# Patient Record
Sex: FEMALE | Race: OTHER | NOT HISPANIC OR LATINO | URBAN - METROPOLITAN AREA
[De-identification: names, ages, dates, MRNs, and addresses within clinical notes are randomized per-mention and may not be internally consistent; named-entity substitution may affect disease eponyms.]

---

## 2017-10-05 VITALS
OXYGEN SATURATION: 97 % | HEART RATE: 105 BPM | RESPIRATION RATE: 16 BRPM | TEMPERATURE: 99 F | SYSTOLIC BLOOD PRESSURE: 155 MMHG | DIASTOLIC BLOOD PRESSURE: 85 MMHG

## 2017-10-05 LAB
ALBUMIN SERPL ELPH-MCNC: 3 G/DL — LOW (ref 3.3–5)
ALP SERPL-CCNC: 76 U/L — SIGNIFICANT CHANGE UP (ref 40–120)
ALT FLD-CCNC: 12 U/L — SIGNIFICANT CHANGE UP (ref 10–45)
ANION GAP SERPL CALC-SCNC: 13 MMOL/L — SIGNIFICANT CHANGE UP (ref 5–17)
ANION GAP SERPL CALC-SCNC: 14 MMOL/L — SIGNIFICANT CHANGE UP (ref 5–17)
APPEARANCE UR: CLEAR — SIGNIFICANT CHANGE UP
APTT BLD: 26.8 SEC — LOW (ref 27.5–37.4)
AST SERPL-CCNC: 22 U/L — SIGNIFICANT CHANGE UP (ref 10–40)
B-OH-BUTYR SERPL-SCNC: 0.5 MMOL/L — HIGH
BACTERIA # UR AUTO: (no result) /HPF
BASE EXCESS BLDV CALC-SCNC: 5.2 MMOL/L — SIGNIFICANT CHANGE UP
BASE EXCESS BLDV CALC-SCNC: 5.5 MMOL/L — SIGNIFICANT CHANGE UP
BASOPHILS NFR BLD AUTO: 0.2 % — SIGNIFICANT CHANGE UP (ref 0–2)
BILIRUB SERPL-MCNC: 0.3 MG/DL — SIGNIFICANT CHANGE UP (ref 0.2–1.2)
BILIRUB UR-MCNC: NEGATIVE — SIGNIFICANT CHANGE UP
BUN SERPL-MCNC: 17 MG/DL — SIGNIFICANT CHANGE UP (ref 7–23)
BUN SERPL-MCNC: 19 MG/DL — SIGNIFICANT CHANGE UP (ref 7–23)
CALCIUM SERPL-MCNC: 8.5 MG/DL — SIGNIFICANT CHANGE UP (ref 8.4–10.5)
CALCIUM SERPL-MCNC: 9 MG/DL — SIGNIFICANT CHANGE UP (ref 8.4–10.5)
CHLORIDE SERPL-SCNC: 87 MMOL/L — LOW (ref 96–108)
CHLORIDE SERPL-SCNC: 90 MMOL/L — LOW (ref 96–108)
CO2 SERPL-SCNC: 26 MMOL/L — SIGNIFICANT CHANGE UP (ref 22–31)
CO2 SERPL-SCNC: 26 MMOL/L — SIGNIFICANT CHANGE UP (ref 22–31)
COLOR SPEC: YELLOW — SIGNIFICANT CHANGE UP
CREAT SERPL-MCNC: 0.94 MG/DL — SIGNIFICANT CHANGE UP (ref 0.5–1.3)
CREAT SERPL-MCNC: 1.02 MG/DL — SIGNIFICANT CHANGE UP (ref 0.5–1.3)
DIFF PNL FLD: (no result)
EOSINOPHIL NFR BLD AUTO: 0.2 % — SIGNIFICANT CHANGE UP (ref 0–6)
EPI CELLS # UR: SIGNIFICANT CHANGE UP /HPF
GAS PNL BLDV: SIGNIFICANT CHANGE UP
GAS PNL BLDV: SIGNIFICANT CHANGE UP
GLUCOSE SERPL-MCNC: 281 MG/DL — HIGH (ref 70–99)
GLUCOSE SERPL-MCNC: 472 MG/DL — CRITICAL HIGH (ref 70–99)
GLUCOSE UR QL: >=1000
HCO3 BLDV-SCNC: 30 MMOL/L — HIGH (ref 20–27)
HCO3 BLDV-SCNC: 30 MMOL/L — HIGH (ref 20–27)
HCT VFR BLD CALC: 32.6 % — LOW (ref 34.5–45)
HGB BLD-MCNC: 10.6 G/DL — LOW (ref 11.5–15.5)
INR BLD: 1.04 — SIGNIFICANT CHANGE UP (ref 0.88–1.16)
KETONES UR-MCNC: (no result) MG/DL
LACTATE SERPL-SCNC: 1 MMOL/L — SIGNIFICANT CHANGE UP (ref 0.5–2)
LEUKOCYTE ESTERASE UR-ACNC: (no result)
LYMPHOCYTES # BLD AUTO: 11.6 % — LOW (ref 13–44)
MCHC RBC-ENTMCNC: 26.4 PG — LOW (ref 27–34)
MCHC RBC-ENTMCNC: 32.5 G/DL — SIGNIFICANT CHANGE UP (ref 32–36)
MCV RBC AUTO: 81.1 FL — SIGNIFICANT CHANGE UP (ref 80–100)
MONOCYTES NFR BLD AUTO: 8.5 % — SIGNIFICANT CHANGE UP (ref 2–14)
NEUTROPHILS NFR BLD AUTO: 79.5 % — HIGH (ref 43–77)
NITRITE UR-MCNC: NEGATIVE — SIGNIFICANT CHANGE UP
PCO2 BLDV: 43 MMHG — SIGNIFICANT CHANGE UP (ref 41–51)
PCO2 BLDV: 44 MMHG — SIGNIFICANT CHANGE UP (ref 41–51)
PH BLDV: 7.45 — HIGH (ref 7.32–7.43)
PH BLDV: 7.46 — HIGH (ref 7.32–7.43)
PH UR: 6 — SIGNIFICANT CHANGE UP (ref 5–8)
PLATELET # BLD AUTO: 271 K/UL — SIGNIFICANT CHANGE UP (ref 150–400)
PO2 BLDV: 36 MMHG — SIGNIFICANT CHANGE UP
PO2 BLDV: 51 MMHG — SIGNIFICANT CHANGE UP
POTASSIUM SERPL-MCNC: 4 MMOL/L — SIGNIFICANT CHANGE UP (ref 3.5–5.3)
POTASSIUM SERPL-MCNC: 6 MMOL/L — HIGH (ref 3.5–5.3)
POTASSIUM SERPL-SCNC: 4 MMOL/L — SIGNIFICANT CHANGE UP (ref 3.5–5.3)
POTASSIUM SERPL-SCNC: 6 MMOL/L — HIGH (ref 3.5–5.3)
PROT SERPL-MCNC: 8.1 G/DL — SIGNIFICANT CHANGE UP (ref 6–8.3)
PROT UR-MCNC: NEGATIVE MG/DL — SIGNIFICANT CHANGE UP
PROTHROM AB SERPL-ACNC: 11.5 SEC — SIGNIFICANT CHANGE UP (ref 9.8–12.7)
RBC # BLD: 4.02 M/UL — SIGNIFICANT CHANGE UP (ref 3.8–5.2)
RBC # FLD: 12.6 % — SIGNIFICANT CHANGE UP (ref 10.3–16.9)
RBC CASTS # UR COMP ASSIST: < 5 /HPF — SIGNIFICANT CHANGE UP
SAO2 % BLDV: 66 % — SIGNIFICANT CHANGE UP
SAO2 % BLDV: 88 % — SIGNIFICANT CHANGE UP
SODIUM SERPL-SCNC: 127 MMOL/L — LOW (ref 135–145)
SODIUM SERPL-SCNC: 129 MMOL/L — LOW (ref 135–145)
SP GR SPEC: <=1.005 — SIGNIFICANT CHANGE UP (ref 1–1.03)
UROBILINOGEN FLD QL: 0.2 E.U./DL — SIGNIFICANT CHANGE UP
WBC # BLD: 8.9 K/UL — SIGNIFICANT CHANGE UP (ref 3.8–10.5)
WBC # FLD AUTO: 8.9 K/UL — SIGNIFICANT CHANGE UP (ref 3.8–10.5)
WBC UR QL: (no result) /HPF

## 2017-10-05 PROCEDURE — 71010: CPT | Mod: 26

## 2017-10-05 PROCEDURE — 93010 ELECTROCARDIOGRAM REPORT: CPT

## 2017-10-05 PROCEDURE — 74177 CT ABD & PELVIS W/CONTRAST: CPT | Mod: 26

## 2017-10-05 PROCEDURE — 99285 EMERGENCY DEPT VISIT HI MDM: CPT | Mod: 25

## 2017-10-05 RX ORDER — INSULIN HUMAN 100 [IU]/ML
10 INJECTION, SOLUTION SUBCUTANEOUS ONCE
Qty: 0 | Refills: 0 | Status: COMPLETED | OUTPATIENT
Start: 2017-10-05 | End: 2017-10-05

## 2017-10-05 RX ORDER — ACETAMINOPHEN 500 MG
975 TABLET ORAL ONCE
Qty: 0 | Refills: 0 | Status: COMPLETED | OUTPATIENT
Start: 2017-10-05 | End: 2017-10-05

## 2017-10-05 RX ORDER — CEFTRIAXONE 500 MG/1
1 INJECTION, POWDER, FOR SOLUTION INTRAMUSCULAR; INTRAVENOUS ONCE
Qty: 0 | Refills: 0 | Status: COMPLETED | OUTPATIENT
Start: 2017-10-05 | End: 2017-10-05

## 2017-10-05 RX ORDER — SODIUM CHLORIDE 9 MG/ML
2000 INJECTION INTRAMUSCULAR; INTRAVENOUS; SUBCUTANEOUS ONCE
Qty: 0 | Refills: 0 | Status: COMPLETED | OUTPATIENT
Start: 2017-10-05 | End: 2017-10-05

## 2017-10-05 RX ORDER — CALCIUM GLUCONATE 100 MG/ML
1 VIAL (ML) INTRAVENOUS ONCE
Qty: 0 | Refills: 0 | Status: COMPLETED | OUTPATIENT
Start: 2017-10-05 | End: 2017-10-05

## 2017-10-05 RX ADMIN — INSULIN HUMAN 10 UNIT(S): 100 INJECTION, SOLUTION SUBCUTANEOUS at 21:41

## 2017-10-05 RX ADMIN — SODIUM CHLORIDE 1000 MILLILITER(S): 9 INJECTION INTRAMUSCULAR; INTRAVENOUS; SUBCUTANEOUS at 19:40

## 2017-10-05 RX ADMIN — Medication 975 MILLIGRAM(S): at 21:41

## 2017-10-05 RX ADMIN — Medication 975 MILLIGRAM(S): at 20:00

## 2017-10-05 RX ADMIN — Medication 200 GRAM(S): at 21:41

## 2017-10-05 RX ADMIN — CEFTRIAXONE 100 GRAM(S): 500 INJECTION, POWDER, FOR SOLUTION INTRAMUSCULAR; INTRAVENOUS at 19:50

## 2017-10-05 NOTE — ED PROVIDER NOTE - CARE PLAN
Principal Discharge DX:	Pyelonephritis  Secondary Diagnosis:	Hyperglycemia  Secondary Diagnosis:	Weakness

## 2017-10-05 NOTE — ED ADULT TRIAGE NOTE - CHIEF COMPLAINT QUOTE
Pt c/o weakness x one week and chills, denies fever also reports her sugar as 409 mg/dl today. In triage BG is 468 mg/dL.

## 2017-10-05 NOTE — ED PROVIDER NOTE - PHYSICAL EXAMINATION
CON: ao x 3, HENMT: clear oropharynx, soft neck, HEAD: atraumatic, CV: rrr, equal pulses b/l, RESP: cta b/l, GI: +BS, soft, lower abd discomfort, : no CVAT, SKIN: no rash, MSK: no edema, no deformity, NEURO: follows commands appropriately, conversing appropriately, moving all extremities spontaneously

## 2017-10-05 NOTE — ED ADULT NURSE NOTE - OBJECTIVE STATEMENT
Pt presented to ED with hyperglycemia, generalized weakness, lower abdominal pain, nausea and burning urination x 1 week. Pt denies vomiting, diarrhea, chest pain, dizziness, SOB. Upon initial assessment, 102.1 rectal temp noted. Pt has been upgraded to MD Rivera, labs including lactate and blood culture has been sent.

## 2017-10-05 NOTE — ED PROVIDER NOTE - OBJECTIVE STATEMENT
72 yof bib family for progressively weakness, generalized, lower abd pain, dysuria, fever.  no vomiting.  no cp/sob/cough.  hx of DM.  sx: weakness  a/w: no fc  duration: days  quality: dysuria  location: lower abd  radiation: none  modifying/eliciting factors: none

## 2017-10-05 NOTE — ED PROVIDER NOTE - MEDICAL DECISION MAKING DETAILS
febrile, hyperglycemia, generalized weakness, lower abd discomfort though no peritoneal sign, will check UA, dka labs, fluids, abx, antipyretics, CT eval for possible colonic pathology

## 2017-10-06 ENCOUNTER — INPATIENT (INPATIENT)
Facility: HOSPITAL | Age: 72
LOS: 2 days | Discharge: HOME | DRG: 872 | End: 2017-10-09
Attending: SPECIALIST | Admitting: SPECIALIST
Payer: MEDICARE

## 2017-10-06 DIAGNOSIS — E87.1 HYPO-OSMOLALITY AND HYPONATREMIA: ICD-10-CM

## 2017-10-06 DIAGNOSIS — R78.81 BACTEREMIA: ICD-10-CM

## 2017-10-06 DIAGNOSIS — Z98.890 OTHER SPECIFIED POSTPROCEDURAL STATES: Chronic | ICD-10-CM

## 2017-10-06 DIAGNOSIS — I10 ESSENTIAL (PRIMARY) HYPERTENSION: ICD-10-CM

## 2017-10-06 DIAGNOSIS — N12 TUBULO-INTERSTITIAL NEPHRITIS, NOT SPECIFIED AS ACUTE OR CHRONIC: ICD-10-CM

## 2017-10-06 DIAGNOSIS — R63.8 OTHER SYMPTOMS AND SIGNS CONCERNING FOOD AND FLUID INTAKE: ICD-10-CM

## 2017-10-06 DIAGNOSIS — D64.9 ANEMIA, UNSPECIFIED: ICD-10-CM

## 2017-10-06 DIAGNOSIS — R53.1 WEAKNESS: ICD-10-CM

## 2017-10-06 DIAGNOSIS — E11.65 TYPE 2 DIABETES MELLITUS WITH HYPERGLYCEMIA: ICD-10-CM

## 2017-10-06 DIAGNOSIS — A41.9 SEPSIS, UNSPECIFIED ORGANISM: ICD-10-CM

## 2017-10-06 DIAGNOSIS — H40.9 UNSPECIFIED GLAUCOMA: ICD-10-CM

## 2017-10-06 DIAGNOSIS — Z29.9 ENCOUNTER FOR PROPHYLACTIC MEASURES, UNSPECIFIED: ICD-10-CM

## 2017-10-06 DIAGNOSIS — E87.5 HYPERKALEMIA: ICD-10-CM

## 2017-10-06 DIAGNOSIS — R73.9 HYPERGLYCEMIA, UNSPECIFIED: ICD-10-CM

## 2017-10-06 LAB
-  K. PNEUMONIAE GROUP: SIGNIFICANT CHANGE UP
ALBUMIN SERPL ELPH-MCNC: 2.5 G/DL — LOW (ref 3.3–5)
ALP SERPL-CCNC: 62 U/L — SIGNIFICANT CHANGE UP (ref 40–120)
ALT FLD-CCNC: 8 U/L — LOW (ref 10–45)
ANION GAP SERPL CALC-SCNC: 14 MMOL/L — SIGNIFICANT CHANGE UP (ref 5–17)
ANISOCYTOSIS BLD QL: SLIGHT — SIGNIFICANT CHANGE UP
AST SERPL-CCNC: 16 U/L — SIGNIFICANT CHANGE UP (ref 10–40)
BASOPHILS NFR BLD AUTO: 0.1 % — SIGNIFICANT CHANGE UP (ref 0–2)
BILIRUB DIRECT SERPL-MCNC: <0.2 MG/DL — SIGNIFICANT CHANGE UP (ref 0–0.2)
BILIRUB INDIRECT FLD-MCNC: >0 MG/DL — LOW (ref 0.2–1)
BILIRUB SERPL-MCNC: 0.2 MG/DL — SIGNIFICANT CHANGE UP (ref 0.2–1.2)
BUN SERPL-MCNC: 14 MG/DL — SIGNIFICANT CHANGE UP (ref 7–23)
CALCIUM SERPL-MCNC: 8.4 MG/DL — SIGNIFICANT CHANGE UP (ref 8.4–10.5)
CHLORIDE SERPL-SCNC: 94 MMOL/L — LOW (ref 96–108)
CO2 SERPL-SCNC: 26 MMOL/L — SIGNIFICANT CHANGE UP (ref 22–31)
CREAT SERPL-MCNC: 0.82 MG/DL — SIGNIFICANT CHANGE UP (ref 0.5–1.3)
EOSINOPHIL NFR BLD AUTO: 0.1 % — SIGNIFICANT CHANGE UP (ref 0–6)
FERRITIN SERPL-MCNC: 211.2 NG/ML — HIGH (ref 15–150)
FOLATE SERPL-MCNC: 10.6 NG/ML — SIGNIFICANT CHANGE UP (ref 4.8–24.2)
GLUCOSE SERPL-MCNC: 290 MG/DL — HIGH (ref 70–99)
GRAM STN FLD: SIGNIFICANT CHANGE UP
HBA1C BLD-MCNC: 10.9 % — HIGH (ref 4–5.6)
HCT VFR BLD CALC: 28.5 % — LOW (ref 34.5–45)
HGB BLD-MCNC: 9.2 G/DL — LOW (ref 11.5–15.5)
IRON SATN MFR SERPL: 10 % — LOW (ref 14–50)
IRON SATN MFR SERPL: 15 UG/DL — LOW (ref 30–160)
LYMPHOCYTES # BLD AUTO: 10 % — LOW (ref 13–44)
LYMPHOCYTES # BLD AUTO: 7.5 % — LOW (ref 13–44)
MAGNESIUM SERPL-MCNC: 1.3 MG/DL — LOW (ref 1.6–2.6)
MAGNESIUM SERPL-MCNC: 2.4 MG/DL — SIGNIFICANT CHANGE UP (ref 1.6–2.6)
MANUAL DIF COMMENT BLD-IMP: SIGNIFICANT CHANGE UP
MANUAL SMEAR VERIFICATION: SIGNIFICANT CHANGE UP
MCHC RBC-ENTMCNC: 26.4 PG — LOW (ref 27–34)
MCHC RBC-ENTMCNC: 32.3 G/DL — SIGNIFICANT CHANGE UP (ref 32–36)
MCV RBC AUTO: 81.7 FL — SIGNIFICANT CHANGE UP (ref 80–100)
METHOD TYPE: SIGNIFICANT CHANGE UP
MICROCYTES BLD QL: SLIGHT — SIGNIFICANT CHANGE UP
MONOCYTES NFR BLD AUTO: 11 % — SIGNIFICANT CHANGE UP (ref 2–14)
MONOCYTES NFR BLD AUTO: 8.3 % — SIGNIFICANT CHANGE UP (ref 2–14)
NEUTROPHILS NFR BLD AUTO: 75 % — SIGNIFICANT CHANGE UP (ref 43–77)
NEUTROPHILS NFR BLD AUTO: 84 % — HIGH (ref 43–77)
NEUTS BAND # BLD: 4 % — SIGNIFICANT CHANGE UP
OVALOCYTES BLD QL SMEAR: SLIGHT — SIGNIFICANT CHANGE UP
PLAT MORPH BLD: (no result)
PLATELET # BLD AUTO: 228 K/UL — SIGNIFICANT CHANGE UP (ref 150–400)
POLYCHROMASIA BLD QL SMEAR: SLIGHT — SIGNIFICANT CHANGE UP
POTASSIUM SERPL-MCNC: 3.8 MMOL/L — SIGNIFICANT CHANGE UP (ref 3.5–5.3)
POTASSIUM SERPL-SCNC: 3.8 MMOL/L — SIGNIFICANT CHANGE UP (ref 3.5–5.3)
PROT SERPL-MCNC: 6.1 G/DL — SIGNIFICANT CHANGE UP (ref 6–8.3)
RBC # BLD: 3.49 M/UL — LOW (ref 3.8–5.2)
RBC # BLD: 3.49 M/UL — LOW (ref 3.8–5.2)
RBC # FLD: 12.5 % — SIGNIFICANT CHANGE UP (ref 10.3–16.9)
RBC BLD AUTO: (no result)
RETICS/RBC NFR: 1.5 % — SIGNIFICANT CHANGE UP (ref 0.5–2.5)
SODIUM SERPL-SCNC: 134 MMOL/L — LOW (ref 135–145)
TIBC SERPL-MCNC: 149 UG/DL — LOW (ref 220–430)
TRANSFERRIN SERPL-MCNC: 128 MG/DL — LOW (ref 200–360)
UIBC SERPL-MCNC: 134 UG/DL — SIGNIFICANT CHANGE UP (ref 110–370)
VIT B12 SERPL-MCNC: 1032 PG/ML — HIGH (ref 243–894)
WBC # BLD: 9.2 K/UL — SIGNIFICANT CHANGE UP (ref 3.8–10.5)
WBC # FLD AUTO: 9.2 K/UL — SIGNIFICANT CHANGE UP (ref 3.8–10.5)

## 2017-10-06 RX ORDER — INSULIN GLARGINE 100 [IU]/ML
10 INJECTION, SOLUTION SUBCUTANEOUS EVERY MORNING
Qty: 0 | Refills: 0 | Status: DISCONTINUED | OUTPATIENT
Start: 2017-10-06 | End: 2017-10-07

## 2017-10-06 RX ORDER — PANTOPRAZOLE SODIUM 20 MG/1
40 TABLET, DELAYED RELEASE ORAL
Qty: 0 | Refills: 0 | Status: DISCONTINUED | OUTPATIENT
Start: 2017-10-06 | End: 2017-10-09

## 2017-10-06 RX ORDER — MAGNESIUM SULFATE 500 MG/ML
4 VIAL (ML) INJECTION ONCE
Qty: 0 | Refills: 0 | Status: COMPLETED | OUTPATIENT
Start: 2017-10-06 | End: 2017-10-06

## 2017-10-06 RX ORDER — GLUCAGON INJECTION, SOLUTION 0.5 MG/.1ML
1 INJECTION, SOLUTION SUBCUTANEOUS ONCE
Qty: 0 | Refills: 0 | Status: DISCONTINUED | OUTPATIENT
Start: 2017-10-06 | End: 2017-10-09

## 2017-10-06 RX ORDER — DEXTROSE 50 % IN WATER 50 %
1 SYRINGE (ML) INTRAVENOUS ONCE
Qty: 0 | Refills: 0 | Status: DISCONTINUED | OUTPATIENT
Start: 2017-10-06 | End: 2017-10-09

## 2017-10-06 RX ORDER — BETAXOLOL HCL 0.25 %
1 SUSPENSION, DROPS(FINAL DOSAGE FORM)(ML) OPHTHALMIC (EYE)
Qty: 0 | Refills: 0 | COMMUNITY

## 2017-10-06 RX ORDER — ACETAMINOPHEN 500 MG
650 TABLET ORAL EVERY 6 HOURS
Qty: 0 | Refills: 0 | Status: DISCONTINUED | OUTPATIENT
Start: 2017-10-06 | End: 2017-10-09

## 2017-10-06 RX ORDER — INSULIN ASPART 100 [IU]/ML
8 INJECTION, SOLUTION SUBCUTANEOUS
Qty: 0 | Refills: 0 | COMMUNITY

## 2017-10-06 RX ORDER — INSULIN GLARGINE 100 [IU]/ML
4 INJECTION, SOLUTION SUBCUTANEOUS
Qty: 0 | Refills: 0 | COMMUNITY

## 2017-10-06 RX ORDER — PIPERACILLIN AND TAZOBACTAM 4; .5 G/20ML; G/20ML
3.38 INJECTION, POWDER, LYOPHILIZED, FOR SOLUTION INTRAVENOUS EVERY 6 HOURS
Qty: 0 | Refills: 0 | Status: DISCONTINUED | OUTPATIENT
Start: 2017-10-06 | End: 2017-10-09

## 2017-10-06 RX ORDER — DEXTROSE 50 % IN WATER 50 %
25 SYRINGE (ML) INTRAVENOUS ONCE
Qty: 0 | Refills: 0 | Status: DISCONTINUED | OUTPATIENT
Start: 2017-10-06 | End: 2017-10-09

## 2017-10-06 RX ORDER — SODIUM CHLORIDE 9 MG/ML
1000 INJECTION INTRAMUSCULAR; INTRAVENOUS; SUBCUTANEOUS ONCE
Qty: 0 | Refills: 0 | Status: COMPLETED | OUTPATIENT
Start: 2017-10-06 | End: 2017-10-06

## 2017-10-06 RX ORDER — OMEPRAZOLE 10 MG/1
1 CAPSULE, DELAYED RELEASE ORAL
Qty: 0 | Refills: 0 | COMMUNITY

## 2017-10-06 RX ORDER — LOVASTATIN 20 MG
1 TABLET ORAL
Qty: 0 | Refills: 0 | COMMUNITY

## 2017-10-06 RX ORDER — ATORVASTATIN CALCIUM 80 MG/1
10 TABLET, FILM COATED ORAL AT BEDTIME
Qty: 0 | Refills: 0 | Status: DISCONTINUED | OUTPATIENT
Start: 2017-10-06 | End: 2017-10-09

## 2017-10-06 RX ORDER — DEXTROSE 50 % IN WATER 50 %
12.5 SYRINGE (ML) INTRAVENOUS ONCE
Qty: 0 | Refills: 0 | Status: DISCONTINUED | OUTPATIENT
Start: 2017-10-06 | End: 2017-10-09

## 2017-10-06 RX ORDER — METFORMIN HYDROCHLORIDE 850 MG/1
1 TABLET ORAL
Qty: 0 | Refills: 0 | COMMUNITY

## 2017-10-06 RX ORDER — INSULIN LISPRO 100/ML
3 VIAL (ML) SUBCUTANEOUS
Qty: 0 | Refills: 0 | Status: DISCONTINUED | OUTPATIENT
Start: 2017-10-06 | End: 2017-10-09

## 2017-10-06 RX ORDER — INSULIN LISPRO 100/ML
VIAL (ML) SUBCUTANEOUS
Qty: 0 | Refills: 0 | Status: DISCONTINUED | OUTPATIENT
Start: 2017-10-06 | End: 2017-10-09

## 2017-10-06 RX ORDER — SODIUM CHLORIDE 9 MG/ML
1000 INJECTION INTRAMUSCULAR; INTRAVENOUS; SUBCUTANEOUS
Qty: 0 | Refills: 0 | Status: DISCONTINUED | OUTPATIENT
Start: 2017-10-06 | End: 2017-10-09

## 2017-10-06 RX ORDER — SODIUM CHLORIDE 9 MG/ML
500 INJECTION INTRAMUSCULAR; INTRAVENOUS; SUBCUTANEOUS ONCE
Qty: 0 | Refills: 0 | Status: COMPLETED | OUTPATIENT
Start: 2017-10-06 | End: 2017-10-06

## 2017-10-06 RX ORDER — SODIUM CHLORIDE 9 MG/ML
1000 INJECTION, SOLUTION INTRAVENOUS
Qty: 0 | Refills: 0 | Status: DISCONTINUED | OUTPATIENT
Start: 2017-10-06 | End: 2017-10-09

## 2017-10-06 RX ADMIN — Medication 650 MILLIGRAM(S): at 16:28

## 2017-10-06 RX ADMIN — Medication 650 MILLIGRAM(S): at 04:34

## 2017-10-06 RX ADMIN — SODIUM CHLORIDE 80 MILLILITER(S): 9 INJECTION INTRAMUSCULAR; INTRAVENOUS; SUBCUTANEOUS at 16:39

## 2017-10-06 RX ADMIN — PIPERACILLIN AND TAZOBACTAM 3.33 GRAM(S): 4; .5 INJECTION, POWDER, LYOPHILIZED, FOR SOLUTION INTRAVENOUS at 09:37

## 2017-10-06 RX ADMIN — Medication 2: at 18:12

## 2017-10-06 RX ADMIN — SODIUM CHLORIDE 80 MILLILITER(S): 9 INJECTION INTRAMUSCULAR; INTRAVENOUS; SUBCUTANEOUS at 16:27

## 2017-10-06 RX ADMIN — Medication 6: at 13:31

## 2017-10-06 RX ADMIN — Medication 650 MILLIGRAM(S): at 03:34

## 2017-10-06 RX ADMIN — PIPERACILLIN AND TAZOBACTAM 200 GRAM(S): 4; .5 INJECTION, POWDER, LYOPHILIZED, FOR SOLUTION INTRAVENOUS at 15:29

## 2017-10-06 RX ADMIN — Medication 3 UNIT(S): at 13:31

## 2017-10-06 RX ADMIN — PANTOPRAZOLE SODIUM 40 MILLIGRAM(S): 20 TABLET, DELAYED RELEASE ORAL at 06:08

## 2017-10-06 RX ADMIN — Medication 6: at 06:59

## 2017-10-06 RX ADMIN — SODIUM CHLORIDE 1801.8 MILLILITER(S): 9 INJECTION INTRAMUSCULAR; INTRAVENOUS; SUBCUTANEOUS at 16:40

## 2017-10-06 RX ADMIN — PIPERACILLIN AND TAZOBACTAM 3.33 GRAM(S): 4; .5 INJECTION, POWDER, LYOPHILIZED, FOR SOLUTION INTRAVENOUS at 03:34

## 2017-10-06 RX ADMIN — PIPERACILLIN AND TAZOBACTAM 200 GRAM(S): 4; .5 INJECTION, POWDER, LYOPHILIZED, FOR SOLUTION INTRAVENOUS at 22:10

## 2017-10-06 RX ADMIN — INSULIN GLARGINE 10 UNIT(S): 100 INJECTION, SOLUTION SUBCUTANEOUS at 09:37

## 2017-10-06 RX ADMIN — Medication 100 GRAM(S): at 04:59

## 2017-10-06 RX ADMIN — ATORVASTATIN CALCIUM 10 MILLIGRAM(S): 80 TABLET, FILM COATED ORAL at 22:10

## 2017-10-06 RX ADMIN — SODIUM CHLORIDE 4000 MILLILITER(S): 9 INJECTION INTRAMUSCULAR; INTRAVENOUS; SUBCUTANEOUS at 03:34

## 2017-10-06 RX ADMIN — Medication 3 UNIT(S): at 18:12

## 2017-10-06 NOTE — CONSULT NOTE ADULT - SUBJECTIVE AND OBJECTIVE BOX
71 yo F PMH DM2 on insulin w/ complication, HTN presenting with weakness for over one week. The patient came in yesterday with complains of back pain, frequent urination with dysuria, chills and fevers for 2 weeks. She came in the ED and found to be febrile and started treatment for   pyelonephritis. The renal service is activated for possible abscesses. When I saw the patient she is lying in her bed. feels better today compared to yesterday. Still has back pain and suprpubic pain, noticed changing in the color of urine. Does not have much appetite, feel nauseous.        Patient is a 72y Female admitted for     PAST MEDICAL & SURGICAL HISTORY:  HTN (hypertension)  Diabetes  History of thyroid surgery      MEDICATIONS  (STANDING):  atorvastatin 10 milliGRAM(s) Oral at bedtime  dextrose 5%. 1000 milliLiter(s) (50 mL/Hr) IV Continuous <Continuous>  dextrose 50% Injectable 12.5 Gram(s) IV Push once  dextrose 50% Injectable 25 Gram(s) IV Push once  dextrose 50% Injectable 25 Gram(s) IV Push once  insulin glargine Injectable (LANTUS) 10 Unit(s) SubCutaneous every morning  insulin lispro (HumaLOG) corrective regimen sliding scale   SubCutaneous Before meals and at bedtime  insulin lispro Injectable (HumaLOG) 3 Unit(s) SubCutaneous three times a day before meals  pantoprazole    Tablet 40 milliGRAM(s) Oral before breakfast  piperacillin/tazobactam IVPB. 3.375 Gram(s) IV Intermittent every 6 hours    MEDICATIONS  (PRN):  acetaminophen   Tablet. 650 milliGRAM(s) Oral every 6 hours PRN Moderate Pain (4 - 6)  dextrose Gel 1 Dose(s) Oral once PRN Blood Glucose LESS THAN 70 milliGRAM(s)/deciliter  glucagon  Injectable 1 milliGRAM(s) IntraMuscular once PRN Glucose LESS THAN 70 milligrams/deciliter      Allergies    No Known Allergies    Intolerances        SOCIAL HISTORY: no alcohol, no drugs, no smoking     FAMILY HISTORY: none       T(C): , Max: 38.9 (10-05-17 @ 19:28)  T(F): , Max: 102.1 (10-05-17 @ 19:28)  HR: 71 (10-06-17 @ 08:28)  BP: 116/71 (10-06-17 @ 08:28)  BP(mean): --  RR: 17 (10-06-17 @ 08:28)  SpO2: 99% (10-06-17 @ 08:28)  Wt(kg): --    10-05 @ 07:01  -  10-06 @ 07:00  --------------------------------------------------------  IN: 1200 mL / OUT: 0 mL / NET: 1200 mL      Height (cm): 162.56 (10-06 @ 03:03)  Weight (kg): 63 (10-06 @ 05:02)  BMI (kg/m2): 23.8 (10-06 @ 05:02)  BSA (m2): 1.68 (10-06 @ 05:02)    Physical exam:   Alert and oriented   Lying in the bed, not in distress   Normal air entry into the lungs, no wheezing, no crackles   RRR, normal s1/s2, no murmurs, rubs or gallops   Abdomen - soft, non-tender, non-distended, suprapubic tenderness   CVA tenderness on the right side     LABS:                        9.2    9.2   )-----------( 228      ( 06 Oct 2017 06:14 )             28.5     10-06    134<L>  |  94<L>  |  14  ----------------------------<  290<H>  3.8   |  26  |  0.82    Ca    8.4      06 Oct 2017 06:13  Mg     2.4     10-06    TPro  6.1  /  Alb  2.5<L>  /  TBili  0.2  /  DBili  <0.2  /  AST  16  /  ALT  8<L>  /  AlkPhos  62  10-06    Hemoglobin A1C, Whole Blood: 10.9 % <H> [4.0 - 5.6] (10-06 @ 06:14)    PT/INR - ( 05 Oct 2017 19:52 )   PT: 11.5 sec;   INR: 1.04          PTT - ( 05 Oct 2017 19:52 )  PTT:26.8 sec  Urinalysis Basic - ( 05 Oct 2017 19:52 )    Color: Yellow / Appearance: Clear / SG: <=1.005 / pH: x  Gluc: x / Ketone: Trace mg/dL  / Bili: Negative / Urobili: 0.2 E.U./dL   Blood: x / Protein: NEGATIVE mg/dL / Nitrite: NEGATIVE   Leuk Esterase: Small / RBC: < 5 /HPF / WBC Many /HPF   Sq Epi: x / Non Sq Epi: Rare /HPF / Bacteria: Many /HPF            RADIOLOGY & ADDITIONAL STUDIES:    < from: CT Abdomen and Pelvis w/ IV Cont (10.05.17 @ 22:53) >           INTERPRETATION:  The preliminary virtual radiologic report was reviewed   by the attending radiologist on 7/6/2017 at 1111 hours, with the   following modifications:    Enteric contrast was not administered, limiting complete evaluation of   the gastrointestinal tract.    I agree with the findings of a probable developing lobar nephronia in the   upper pole of the left kidney, given findings in the contralateral   kidney.   However, a short-term follow-up contrast enhanced CT of the abdomen is   recommended, to doubt to document interval resolution and exclude   underlying mass lesion.    I concur with the remainder of the radiology report.    < end of copied text >
HPI:  73 yo F PMH DM2, HTN presenting with weakness for over one week.  Prior to her weakness, she has been having dysuria, polyuria, and malodorous urine for 2-3 weeks.  Urine is yellow, more frothy than usual.  No gross hematuria.  Had similar symptoms 4 yrs ago 2/2 UTI but was not hospitalized then.  States she also has been having chills for 2-3 d.  Also reports lower abdominal pain, central, that worsens when lying on her back.  Pain is usually constant, sharp, currently 7/10.  Pain started around the same time as her urinary symptoms.  Denies NVD, fever, cough, CP, palpitations, SOB.  Did not want to seek care initially but due to ongoing weakness, decided to come to the ED.  Has been compliant with her meds but did not take them today.  States she feels very cold.  In the ED, VS significant for temp 102.1, .  Labs revealed  Na 127, K 6.0, glc 472, UA pos for small LE, many WBCs.  CT a/p w/ IV cont revealed R pyelo and L small fluid attenuations, cysts vs. subcentimeter abscesses.  Given 2 L NS x 1, 1 g ceft IV x 1, tylenol 975 PO x 1, ca gluconate 1 g IV x 1, 10 U regular insulin.  Admitted for sepsis 2/2 acute pyelo. (06 Oct 2017 02:48)     called to see patient for CT which showed right pyelo, left renal cluster sub-centimeter abscesses vs. cysts. Pt still endorses LUTS mentioned above. She does feel as if she is emptying her bladder. No other significant  history. Pt currently on zosyn and has been afebrile & hemodynamically stable today       Vital Signs Last 24 Hrs  T(C): 36.6 (06 Oct 2017 08:28), Max: 38.9 (05 Oct 2017 19:28)  T(F): 97.9 (06 Oct 2017 08:28), Max: 102.1 (05 Oct 2017 19:28)  HR: 71 (06 Oct 2017 08:28) (71 - 108)  BP: 116/71 (06 Oct 2017 08:28) (116/71 - 198/72)  BP(mean): --  RR: 17 (06 Oct 2017 08:28) (16 - 20)  SpO2: 99% (06 Oct 2017 08:28) (94% - 99%)  I&O's Summary    05 Oct 2017 07:01  -  06 Oct 2017 07:00  --------------------------------------------------------  IN: 1200 mL / OUT: 0 mL / NET: 1200 mL        PE:  Gen: NAD  Abd: slightly tender suprapubic area, (+) b/l CVAT, R>L  : voiding malodorous urine     LABS:                        9.2    9.2   )-----------( 228      ( 06 Oct 2017 06:14 )             28.5     10-06    134<L>  |  94<L>  |  14  ----------------------------<  290<H>  3.8   |  26  |  0.82    Ca    8.4      06 Oct 2017 06:13  Mg     2.4     10-06    TPro  6.1  /  Alb  2.5<L>  /  TBili  0.2  /  DBili  <0.2  /  AST  16  /  ALT  8<L>  /  AlkPhos  62  10-06    PT/INR - ( 05 Oct 2017 19:52 )   PT: 11.5 sec;   INR: 1.04          PTT - ( 05 Oct 2017 19:52 )  PTT:26.8 sec  Cultures  Culture Results:   No growth at 12 hours (10-05 @ 20:52)  Culture Results:   Culture in progress (10-05 @ 20:52)      A/P: 72F with right-sided pyelo, left renal sub-centimeter abscesses vs cysts  1- Fu PVR make sure she is emptying her bladder  2- Fu Ucx, Bcx, tailor abx accordingly  3- No  intervention;  if patient becomes febrile or status worsens may consider surgical intervention   4- D/w  team
HPI:  71 yo F PMH DM2 on insulin w/ complication, HTN presenting with weakness for over one week.  Prior to her weakness, she has been having dysuria, polyuria, and malodorous urine for 2-3 weeks.  Urine is yellow, more frothy than usual.  No gross hematuria.  Had similar symptoms 4 yrs ago 2/2 UTI but was not hospitalized then.  States she also has been having chills for 2-3 d.  Also reports lower abdominal pain, central, that worsens when lying on her back.  Pain is usually constant, sharp, currently 7/10.  Pain started around the same time as her urinary symptoms.  Denies NVD, fever, cough, CP, palpitations, SOB.  Did not want to seek care initially but due to ongoing weakness, decided to come to the ED.  Has been compliant with her meds but did not take them today.  States she feels very cold.  In the ED, VS significant for temp 102.1, .  Labs revealed  Na 127, K 6.0, glc 472, UA pos for small LE, many WBCs.  CT a/p w/ IV cont revealed R pyelo and L small fluid attenuations, cysts vs. subcentimeter abscesses.  Given 2 L NS x 1, 1 g ceft IV x 1, tylenol 975 PO x 1, ca gluconate 1 g IV x 1, 10 U regular insulin.  Admitted for sepsis 2/2 acute pyelo. (06 Oct 2017 02:48)      PAST MEDICAL & SURGICAL HISTORY:  HTN (hypertension)  Diabetes  History of thyroid surgery      REVIEW OF SYSTEMS:    Constitutional: No fever, weight loss or fatigue  Eyes: No eye pain, visual disturbances, or discharge  ENMT:  No difficulty hearing, tinnitus, vertigo; No sinus or throat pain  Neck: No pain or stiffness  Respiratory: No cough, wheezing, chills or hemoptysis  Cardiovascular: No chest pain, palpitations, shortness of breath, dizziness or leg swelling  Gastrointestinal: No abdominal or epigastric pain. No nausea, vomiting or hematemesis; No diarrhea or constipation. No melena or hematochezia.  Genitourinary: flank pain  Neurological: No headaches, memory loss, loss of strength, numbness or tremors  Skin: No itching, burning, rashes or lesions   Lymph Nodes: No enlarged glands  Endocrine: No heat or cold intolerance; No hair loss  Musculoskeletal: No joint pain or swelling; No muscle, back or extremity pain  Heme/Lymph: No easy bruising or bleeding gums  Allergy and Immunologic: No hives or eczema    MEDICATIONS  (STANDING):  atorvastatin 10 milliGRAM(s) Oral at bedtime  dextrose 5%. 1000 milliLiter(s) (50 mL/Hr) IV Continuous <Continuous>  dextrose 50% Injectable 12.5 Gram(s) IV Push once  dextrose 50% Injectable 25 Gram(s) IV Push once  dextrose 50% Injectable 25 Gram(s) IV Push once  insulin glargine Injectable (LANTUS) 10 Unit(s) SubCutaneous every morning  insulin lispro (HumaLOG) corrective regimen sliding scale   SubCutaneous Before meals and at bedtime  insulin lispro Injectable (HumaLOG) 3 Unit(s) SubCutaneous three times a day before meals  pantoprazole    Tablet 40 milliGRAM(s) Oral before breakfast  piperacillin/tazobactam IVPB. 3.375 Gram(s) IV Intermittent every 6 hours    MEDICATIONS  (PRN):  acetaminophen   Tablet. 650 milliGRAM(s) Oral every 6 hours PRN Moderate Pain (4 - 6)  dextrose Gel 1 Dose(s) Oral once PRN Blood Glucose LESS THAN 70 milliGRAM(s)/deciliter  glucagon  Injectable 1 milliGRAM(s) IntraMuscular once PRN Glucose LESS THAN 70 milligrams/deciliter      piperacillin/tazobactam IVPB. 3.375 Gram(s) IV Intermittent every 6 hours      Allergies    No Known Allergies    Intolerances        SOCIAL HISTORY:    FAMILY HISTORY:      Vital Signs Last 24 Hrs  T(C): 36.6 (06 Oct 2017 08:28), Max: 38.9 (05 Oct 2017 19:28)  T(F): 97.9 (06 Oct 2017 08:28), Max: 102.1 (05 Oct 2017 19:28)  HR: 71 (06 Oct 2017 08:28) (71 - 108)  BP: 116/71 (06 Oct 2017 08:28) (116/71 - 198/72)  BP(mean): --  RR: 17 (06 Oct 2017 08:28) (16 - 20)  SpO2: 99% (06 Oct 2017 08:28) (94% - 99%)    PHYSICAL EXAM:    General: ; in no acute distress  Eyes: PERRL, EOM intact; conjunctiva and sclera clear  Head: Normocephalic; atraumatic  ENMT: No nasal discharge; airway clear  Neck: Supple; non tender; no masses  Respiratory: No wheezes, rales or rhonchi  Cardiovascular: Regular rate and rhythm. S1 and S2 Normal; No murmurs, gallops or rubs  Gastrointestinal: Soft non-tender non-distended; Normal bowel sounds; No hepatosplenomegaly  Genitourinary: bilateral flank tenderness  Extremities: Normal range of motion, No clubbing, cyanosis or edema  Vascular: Peripheral pulses palpable 2+ bilaterally  Neurological: Alert and oriented x3  Skin: Warm and dry. No acute rash  Lymph Nodes: No acute cervical adenopathy  Musculoskeletal: Normal gait, tone, without deformities    LABS:                        9.2    9.2   )-----------( 228      ( 06 Oct 2017 06:14 )             28.5     10-06    134<L>  |  94<L>  |  14  ----------------------------<  290<H>  3.8   |  26  |  0.82    Ca    8.4      06 Oct 2017 06:13  Mg     2.4     10-06    TPro  6.1  /  Alb  2.5<L>  /  TBili  0.2  /  DBili  <0.2  /  AST  16  /  ALT  8<L>  /  AlkPhos  62  10-06    PT/INR - ( 05 Oct 2017 19:52 )   PT: 11.5 sec;   INR: 1.04          PTT - ( 05 Oct 2017 19:52 )  PTT:26.8 sec  Urinalysis Basic - ( 05 Oct 2017 19:52 )    Color: Yellow / Appearance: Clear / SG: <=1.005 / pH: x  Gluc: x / Ketone: Trace mg/dL  / Bili: Negative / Urobili: 0.2 E.U./dL   Blood: x / Protein: NEGATIVE mg/dL / Nitrite: NEGATIVE   Leuk Esterase: Small / RBC: < 5 /HPF / WBC Many /HPF   Sq Epi: x / Non Sq Epi: Rare /HPF / Bacteria: Many /HPF      Culture Results:   No growth at 12 hours (10-05 @ 20:52)  Culture Results:   Culture in progress (10-05 @ 20:52)    RADIOLOGY & ADDITIONAL STUDIES:

## 2017-10-06 NOTE — H&P ADULT - ASSESSMENT
71 yo F PMH DM2 on insulin w/ complication, HTN presenting with weakness for over one week, found to have multiple metabolic derangements due to acute pyelonephritis.

## 2017-10-06 NOTE — PROGRESS NOTE ADULT - ASSESSMENT
71 yo F with PMHx of HTN and diabetes on insulin presented due to weakness x 1 week and dysuria, burning upon urination, and malodorous urine x 3 week. 73 yo F with PMHx of HTN and diabetes on insulin presented due to weakness x 1 week and dysuria, burning upon urination, and malodorous urine x 3 week. CT with IV contrast demonstrated R-sided pyelonephritis and potential L-sided subcentimenter abscess. Blood culture PCR + for bacteremia with Klebsiella.

## 2017-10-06 NOTE — PROGRESS NOTE ADULT - PROBLEM SELECTOR PLAN 8
improve score 1.   -scds, OOB. Improve score 1, no need for DVT PPx  -scds, OOB.    Continue care on medicine.

## 2017-10-06 NOTE — PROGRESS NOTE ADULT - PROBLEM SELECTOR PLAN 3
Normocytic anemia  - Hb dropped from 10.6 to 9.2; no active bleeding;  - Anemia of chronic disease (low iron, low TIBC, increased ferritin)  - Hb dropped from 10.6 to 9.2 (10/6); no active bleeding  - F/u on B12, folate  - reticulocyte count: 1.5 Likely mixed in setting of iron deficiency and anemia of chronic disease (low iron, low TIBC, increased ferritin)  - Hb dropped from 10.6 to 9.2 (10/6); no signs of active bleeding or hemolysis  - monitor for now, transfusion goal Hgb>7, active type and screen  - F/u on B12, folate  - reticulocyte count: 1.5

## 2017-10-06 NOTE — H&P ADULT - NSHPSOCIALHISTORY_GEN_ALL_CORE
nonsmoker  retired, lives at home with ted, has home attendant 3x/week to help with meals, bath.    ambulates with cane

## 2017-10-06 NOTE — H&P ADULT - PROBLEM SELECTOR PLAN 3
calculated serum osm 287- normotonic, which is odd considering hyperglycemia.  history of polydipsia and hyperlipidemia may be why her serum osm is isotonic.  getting another 1 L NS, as she appears dry on exam   -will trend BMP and not give further IVF after bolus

## 2017-10-06 NOTE — H&P ADULT - NSHPPHYSICALEXAM_GEN_ALL_CORE
PHYSICAL EXAM:  Constitutional:  having chills  HEENT:  PERRLA, MM dry   Respiratory:  ctab  Cardiovascular:  tachycardic, no murmurs  Gastrointestinal:  BS intact, nondistended, soft, TTP b/l flank and lower abdominal b/l  :  b/l CVA tenderness; suprapubic pain  Extremities:  no edema b/l  Vascular:  wwp  Neurological:  a&ox3

## 2017-10-06 NOTE — CONSULT NOTE ADULT - PROBLEM SELECTOR RECOMMENDATION 9
- clinical, UA and  laboratory findings consistent with that   - imaging finding support that   - possible abscesses on the left - follow up Gu RECOMMendation   - started on ceftriaxone and switch to Zosyn now   - possible Jarrett when she came - renal function has improved after iv fluids   - continue with iv fluids - 80 ml/hour   - encourage fluid intake   - blood culture - positive for Gram negative rods - please follow up the final results   - follow up the urine culture also   -repeat the blood cultures
await culture results

## 2017-10-06 NOTE — PROGRESS NOTE ADULT - PROBLEM SELECTOR PLAN 1
Complicated nephritis due to poorly controlled diabetes (HbA1C: 10. 9)  - pt admitted for sepsis (SIRS criteria: Fever and tachycardia)--> fever and tachycardia resolved  - Blood culture PCR: Gram (-) Klebsiella --> sensitivity is not back--> pt on Zosyn for broad coverage including pseudomonas  - Urinary symptoms improving  - give tylenol 650 mg PO PRN for fever and chills  - urology consult/ nephology consult for L- sided renal abscesses 2/2 complicated pyelonephritis due to poorly controlled diabetes (HbA1C: 10. 9)  - pt admitted for sepsis (SIRS criteria: Fever and tachycardia)--> fever and tachycardia resolved  - Blood culture PCR: Gram (-) Klebsiella --> sensitivity is not back--> pt on Zosyn for 10-14 days for broad coverage including pseudomonas  - give tylenol 650 mg PO PRN for fever and chills  - urology consult/ nephology consult for L- sided renal abscesses--> per urology, likely not going to intervene due to small abscess; will ask whether repeat imaging is needed  - F/U urine culture  - repeat blood culture today Likely 2/2 Klebsiella bacteremia and pyelonephritis. Met sepsis criteria on admission, fever/tachycardia  - Blood culture PCR: Gram (-) Klebsiella, pending sensitivities; pt on Zosyn 3.375mg q6hrs for broad coverage including pseudomonas  - give tylenol 650 mg PO PRN for fever and chills  - c/w normal saline 80cc/hr maintenance  - consulted urology and nephology for L- sided renal abscesses--> per urology, likely not going to intervene due to small abscess; will ask whether repeat imaging is needed  - F/U urine culture  - repeated surveillance blood culture today

## 2017-10-06 NOTE — H&P ADULT - NSHPLABSRESULTS_GEN_ALL_CORE
10.6   8.9   )-----------( 271      ( 05 Oct 2017 19:52 )             32.6    10-05    129<L>  |  90<L>  |  17  ----------------------------<  281<H>  4.0   |  26  |  0.94    Ca    8.5      05 Oct 2017 23:14    TPro  8.1  /  Alb  3.0<L>  /  TBili  0.3  /  DBili  x   /  AST  22  /  ALT  12  /  AlkPhos  76  10-05    Lactate, Blood (10.05.17 @ 19:51)    Lactate, Blood: 1.0 mmoL/L    Beta Hydroxy-Butyrate (10.05.17 @ 19:52)    Beta Hydroxy-Butyrate: 0.5 mmoL/L    ABG - ( 05 Oct 2017 23:05 )  pH: 7.45  /  pCO2: 44    /  pO2: 36    / HCO3: 30    / Base Excess: x     /  SaO2: 66        ABG - ( 05 Oct 2017 19:51 )  pH: 7.46  /  pCO2: 43    /  pO2: 51    / HCO3: 30    / Base Excess: x     /  SaO2: 88        Urinalysis Basic - ( 05 Oct 2017 19:52 )    Color: Yellow / Appearance: Clear / SG: <=1.005 / pH: x  Gluc: x / Ketone: Trace mg/dL  / Bili: Negative / Urobili: 0.2 E.U./dL   Blood: x / Protein: NEGATIVE mg/dL / Nitrite: NEGATIVE   Leuk Esterase: Small / RBC: < 5 /HPF / WBC Many /HPF   Sq Epi: x / Non Sq Epi: Rare /HPF / Bacteria: Many /HPF    CT a/p IV cont 10/5    IMPRESSION:  1. Right pyelonephritis.  2. There is a cluster of small fluid attenuation structures in the   superior pole the left kidney, laterally,  the largest of which measures 9 mm in size, with equivocal associated   local decreased renal  enhancement, potentially representing pyelonephritis with small   subcentimeter abscesses.  Alternatively, this may represent small incidental renal cysts. RECOMMEND   comparison with any  available prior studies.

## 2017-10-06 NOTE — H&P ADULT - PROBLEM SELECTOR PLAN 8
dash, diabetic diet.  replete lytes prn betaxolol HCL not in formulary.  no alternatives.  -have family bring solution

## 2017-10-06 NOTE — H&P ADULT - PROBLEM SELECTOR PLAN 1
would label as complicated until HbA1c known for level of DM control.  still urinating without retention.  no obstruction on CT scan.  concern given possible L subcentimeter renal abscesses.  septic currently.   -will broaden coverage to zosyn, as pt is diabetic, will cover for pseudomonas  -give another 1 L NS, as pt appears dry  -give tylenol 650 mg PO now for fever, chills  -ensure pt is urinating  -will consult IR or urology if pt does not improve or decompensates given possible abscesses

## 2017-10-06 NOTE — H&P ADULT - PROBLEM SELECTOR PLAN 4
likely in setting of infection, and pt not taking meds today due to illness.  improving after 10 U regular insulin  -will give 10 U lantus in AM, FSG qid, ISS  -manage pyelo  -restart statin for dm

## 2017-10-06 NOTE — PROGRESS NOTE ADULT - PROBLEM SELECTOR PLAN 4
Resolved Pseudohyponatremia due to hyperglycemia. Na 127, corrected -> 133. This am corrected 137.  - Resolving, monitor

## 2017-10-06 NOTE — PROGRESS NOTE ADULT - PROBLEM SELECTOR PLAN 6
betaxolol HCL not in formulary.  no alternatives.  -have family bring solution. Betaxolol HCL not in formulary here.

## 2017-10-06 NOTE — PROGRESS NOTE ADULT - SUBJECTIVE AND OBJECTIVE BOX
OVERNIGHT EVENTS: Pt had one febrile episode of 100.7. Pt was given Tylenol     SUBJECTIVE / INTERVAL HPI: Patient seen and examined at bedside.     VITAL SIGNS:  Vital Signs Last 24 Hrs  T(C): 36.6 (06 Oct 2017 08:28), Max: 38.9 (05 Oct 2017 19:28)  T(F): 97.9 (06 Oct 2017 08:28), Max: 102.1 (05 Oct 2017 19:28)  HR: 71 (06 Oct 2017 08:28) (71 - 108)  BP: 116/71 (06 Oct 2017 08:28) (116/71 - 198/72)  BP(mean): --  RR: 17 (06 Oct 2017 08:28) (16 - 20)  SpO2: 99% (06 Oct 2017 08:28) (94% - 99%)    PHYSICAL EXAM:    General: WDWN  HEENT: NC/AT; PERRL, anicteric sclera; MMM  Neck: supple  Cardiovascular: +S1/S2, RRR  Respiratory: CTA B/L; no W/R/R  Gastrointestinal: soft, NT/ND; +BSx4  Extremities: WWP; no edema, clubbing or cyanosis  Vascular: 2+ radial, DP/PT pulses B/L  Neurological: AAOx3; no focal deficits    MEDICATIONS:  MEDICATIONS  (STANDING):  atorvastatin 10 milliGRAM(s) Oral at bedtime  dextrose 5%. 1000 milliLiter(s) (50 mL/Hr) IV Continuous <Continuous>  dextrose 50% Injectable 12.5 Gram(s) IV Push once  dextrose 50% Injectable 25 Gram(s) IV Push once  dextrose 50% Injectable 25 Gram(s) IV Push once  insulin glargine Injectable (LANTUS) 10 Unit(s) SubCutaneous every morning  insulin lispro (HumaLOG) corrective regimen sliding scale   SubCutaneous Before meals and at bedtime  pantoprazole    Tablet 40 milliGRAM(s) Oral before breakfast  piperacillin/tazobactam IVPB. 3.375 Gram(s) IV Intermittent every 6 hours    MEDICATIONS  (PRN):  acetaminophen   Tablet. 650 milliGRAM(s) Oral every 6 hours PRN Moderate Pain (4 - 6)  dextrose Gel 1 Dose(s) Oral once PRN Blood Glucose LESS THAN 70 milliGRAM(s)/deciliter  glucagon  Injectable 1 milliGRAM(s) IntraMuscular once PRN Glucose LESS THAN 70 milligrams/deciliter      ALLERGIES:  Allergies    No Known Allergies    Intolerances        LABS:                        9.2    9.2   )-----------( 228      ( 06 Oct 2017 06:14 )             28.5     10-06    134<L>  |  94<L>  |  14  ----------------------------<  290<H>  3.8   |  26  |  0.82    Ca    8.4      06 Oct 2017 06:13  Mg     2.4     10-06    TPro  8.1  /  Alb  3.0<L>  /  TBili  0.3  /  DBili  x   /  AST  22  /  ALT  12  /  AlkPhos  76  10-05    PT/INR - ( 05 Oct 2017 19:52 )   PT: 11.5 sec;   INR: 1.04          PTT - ( 05 Oct 2017 19:52 )  PTT:26.8 sec  Urinalysis Basic - ( 05 Oct 2017 19:52 )    Color: Yellow / Appearance: Clear / SG: <=1.005 / pH: x  Gluc: x / Ketone: Trace mg/dL  / Bili: Negative / Urobili: 0.2 E.U./dL   Blood: x / Protein: NEGATIVE mg/dL / Nitrite: NEGATIVE   Leuk Esterase: Small / RBC: < 5 /HPF / WBC Many /HPF   Sq Epi: x / Non Sq Epi: Rare /HPF / Bacteria: Many /HPF      CAPILLARY BLOOD GLUCOSE  283 (06 Oct 2017 05:57)          RADIOLOGY & ADDITIONAL TESTS: Reviewed.    ASSESSMENT:    PLAN: OVERNIGHT EVENTS: Pt had one febrile episode of 100.7. Pt was given Tylenol 650mg PO.    SUBJECTIVE / INTERVAL HPI: Patient seen and examined at bedside. Pt still complains of suprapubic pain, 5/10 (improved from 7/10 on admission). She endorses that her urinary symptoms have improved since this morning. She noted no dysuria, burning while urinating, and odor. Pt denies chills, NVD, SOB, CP, radiating abdominal pain, hematuria. Pt is worried about home health aid due the home aid not knowing that she is at the hospital. Pt was advised about the importance of coming to hospital early for UTI symptoms.    VITAL SIGNS:  Vital Signs Last 24 Hrs  T(C): 36.6 (06 Oct 2017 08:28), Max: 38.9 (05 Oct 2017 19:28)  T(F): 97.9 (06 Oct 2017 08:28), Max: 102.1 (05 Oct 2017 19:28)  HR: 71 (06 Oct 2017 08:28) (71 - 108)  BP: 116/71 (06 Oct 2017 08:28) (116/71 - 198/72)  BP(mean): --  RR: 17 (06 Oct 2017 08:28) (16 - 20)  SpO2: 99% (06 Oct 2017 08:28) (94% - 99%)    PHYSICAL EXAM:    General: Well appearing; talking excitingly; enjoying breakfast  HEENT: NC/AT; PERRL, anicteric sclera; MMM  Neck: supple  Cardiovascular: +S1/S2, RRR  Respiratory: CTA B/L; no W/R/R  Gastrointestinal: soft, NT/ND; +BS; Tenderness to palpation below the umbilicus  : suprapubic tenderness; b/l flank pain  Extremities: WWP; no edema, clubbing or cyanosis  Vascular: 2+ radial, DP/PT pulses B/L  Neurological: AAOx3; no focal deficits    MEDICATIONS:  MEDICATIONS  (STANDING):  atorvastatin 10 milliGRAM(s) Oral at bedtime  dextrose 5%. 1000 milliLiter(s) (50 mL/Hr) IV Continuous <Continuous>  dextrose 50% Injectable 12.5 Gram(s) IV Push once  dextrose 50% Injectable 25 Gram(s) IV Push once  dextrose 50% Injectable 25 Gram(s) IV Push once  insulin glargine Injectable (LANTUS) 10 Unit(s) SubCutaneous every morning  insulin lispro (HumaLOG) corrective regimen sliding scale   SubCutaneous Before meals and at bedtime  pantoprazole    Tablet 40 milliGRAM(s) Oral before breakfast  piperacillin/tazobactam IVPB. 3.375 Gram(s) IV Intermittent every 6 hours    MEDICATIONS  (PRN):  acetaminophen   Tablet. 650 milliGRAM(s) Oral every 6 hours PRN Moderate Pain (4 - 6)  dextrose Gel 1 Dose(s) Oral once PRN Blood Glucose LESS THAN 70 milliGRAM(s)/deciliter  glucagon  Injectable 1 milliGRAM(s) IntraMuscular once PRN Glucose LESS THAN 70 milligrams/deciliter      ALLERGIES:  Allergies    No Known Allergies    Intolerances        LABS:                        9.2    9.2   )-----------( 228      ( 06 Oct 2017 06:14 )             28.5     10-06    134<L>  |  94<L>  |  14  ----------------------------<  290<H>  3.8   |  26  |  0.82    Ca    8.4      06 Oct 2017 06:13  Mg     2.4     10-06    TPro  8.1  /  Alb  3.0<L>  /  TBili  0.3  /  DBili  x   /  AST  22  /  ALT  12  /  AlkPhos  76  10-05    PT/INR - ( 05 Oct 2017 19:52 )   PT: 11.5 sec;   INR: 1.04          PTT - ( 05 Oct 2017 19:52 )  PTT:26.8 sec  Urinalysis Basic - ( 05 Oct 2017 19:52 )    Color: Yellow / Appearance: Clear / SG: <=1.005 / pH: x  Gluc: x / Ketone: Trace mg/dL  / Bili: Negative / Urobili: 0.2 E.U./dL   Blood: x / Protein: NEGATIVE mg/dL / Nitrite: NEGATIVE   Leuk Esterase: Small / RBC: < 5 /HPF / WBC Many /HPF   Sq Epi: x / Non Sq Epi: Rare /HPF / Bacteria: Many /HPF      CAPILLARY BLOOD GLUCOSE  283 (06 Oct 2017 05:57)          RADIOLOGY & ADDITIONAL TESTS: Reviewed.    ASSESSMENT:    PLAN: OVERNIGHT EVENTS: Pt had one febrile episode of 100.7. Pt was given Tylenol 650mg PO.    SUBJECTIVE / INTERVAL HPI: Patient seen and examined at bedside. Pt still complains of suprapubic pain, 5/10 (improved from 7/10 on admission). She endorses that her urinary symptoms have improved since this morning. She noted no dysuria, burning while urinating, and odor. Pt denies chills, NVD, SOB, CP, radiating abdominal pain, hematuria. Pt is worried about home health aid due the home aid not knowing that she is at the hospital. Pt was advised about the importance of coming to hospital early for UTI symptoms.    VITAL SIGNS:  Vital Signs Last 24 Hrs  T(C): 36.6 (06 Oct 2017 08:28), Max: 38.9 (05 Oct 2017 19:28)  T(F): 97.9 (06 Oct 2017 08:28), Max: 102.1 (05 Oct 2017 19:28)  HR: 71 (06 Oct 2017 08:28) (71 - 108)  BP: 116/71 (06 Oct 2017 08:28) (116/71 - 198/72)  BP(mean): --  RR: 17 (06 Oct 2017 08:28) (16 - 20)  SpO2: 99% (06 Oct 2017 08:28) (94% - 99%)    PHYSICAL EXAM:    General: Well appearing; talking excitingly; enjoying breakfast  HEENT: NC/AT; PERRL, anicteric sclera; MMM  Neck: supple  Cardiovascular: +S1/S2, RRR  Respiratory: CTA B/L; no W/R/R  Gastrointestinal: soft, NT/ND; +BS; Tenderness to palpation below the umbilicus  : suprapubic tenderness; b/l flank pain  Extremities: WWP; no edema, clubbing or cyanosis  Vascular: 2+ radial, DP/PT pulses B/L  Neurological: AAOx3; no focal deficits    MEDICATIONS:  MEDICATIONS  (STANDING):  atorvastatin 10 milliGRAM(s) Oral at bedtime  dextrose 5%. 1000 milliLiter(s) (50 mL/Hr) IV Continuous <Continuous>  dextrose 50% Injectable 12.5 Gram(s) IV Push once  dextrose 50% Injectable 25 Gram(s) IV Push once  dextrose 50% Injectable 25 Gram(s) IV Push once  insulin glargine Injectable (LANTUS) 10 Unit(s) SubCutaneous every morning  insulin lispro (HumaLOG) corrective regimen sliding scale   SubCutaneous Before meals and at bedtime  pantoprazole    Tablet 40 milliGRAM(s) Oral before breakfast  piperacillin/tazobactam IVPB. 3.375 Gram(s) IV Intermittent every 6 hours    MEDICATIONS  (PRN):  acetaminophen   Tablet. 650 milliGRAM(s) Oral every 6 hours PRN Moderate Pain (4 - 6)  dextrose Gel 1 Dose(s) Oral once PRN Blood Glucose LESS THAN 70 milliGRAM(s)/deciliter  glucagon  Injectable 1 milliGRAM(s) IntraMuscular once PRN Glucose LESS THAN 70 milligrams/deciliter      ALLERGIES:  Allergies    No Known Allergies    Intolerances        LABS:                        9.2    9.2   )-----------( 228      ( 06 Oct 2017 06:14 )             28.5     10-06    134<L>  |  94<L>  |  14  ----------------------------<  290<H>  3.8   |  26  |  0.82    Ca    8.4      06 Oct 2017 06:13  Mg     2.4     10-06    TPro  8.1  /  Alb  3.0<L>  /  TBili  0.3  /  DBili  x   /  AST  22  /  ALT  12  /  AlkPhos  76  10-05    PT/INR - ( 05 Oct 2017 19:52 )   PT: 11.5 sec;   INR: 1.04          PTT - ( 05 Oct 2017 19:52 )  PTT:26.8 sec  Urinalysis Basic - ( 05 Oct 2017 19:52 )    Color: Yellow / Appearance: Clear / SG: <=1.005 / pH: x  Gluc: x / Ketone: Trace mg/dL  / Bili: Negative / Urobili: 0.2 E.U./dL   Blood: x / Protein: NEGATIVE mg/dL / Nitrite: NEGATIVE   Leuk Esterase: Small / RBC: < 5 /HPF / WBC Many /HPF   Sq Epi: x / Non Sq Epi: Rare /HPF / Bacteria: Many /HPF      CAPILLARY BLOOD GLUCOSE  283 (06 Oct 2017 05:57)      Chext X-ray: WNL    CT a/p IV cont 10/5    	IMPRESSION:  	1. Right pyelonephritis.  	2. There is a cluster of small fluid attenuation structures in the   	superior pole the left kidney, laterally,  	the largest of which measures 9 mm in size, with equivocal associated   	local decreased renal  	enhancement, potentially representing pyelonephritis with small   	subcentimeter abscesses.  	Alternatively, this may represent small incidental renal cysts. RECOMMEND   	comparison with any  available prior studies.    10/6  Blood culture CPR: Gram (-) rods: Klebsiella pneumoniae  Blood culture growth plate: No growth after 12 hours OVERNIGHT EVENTS: Pt had one febrile episode of 100.7. Pt was given Tylenol 650mg PO.    SUBJECTIVE / INTERVAL HPI: Patient seen and examined at bedside. Pt still complains of suprapubic pain, 5/10 (improved from 7/10 on admission). She endorses that her urinary symptoms have improved since this morning. She noted no dysuria, burning while urinating, and odor. Pt denies chills, NVD, SOB, CP, radiating abdominal pain, hematuria. Pt is worried about home health aid due the home aid not knowing that she is at the hospital. Pt was advised about the importance of coming to hospital early for UTI symptoms.    VITAL SIGNS:  Vital Signs Last 24 Hrs  T(C): 36.6 (06 Oct 2017 08:28), Max: 38.9 (05 Oct 2017 19:28)  T(F): 97.9 (06 Oct 2017 08:28), Max: 102.1 (05 Oct 2017 19:28)  HR: 71 (06 Oct 2017 08:28) (71 - 108)  BP: 116/71 (06 Oct 2017 08:28) (116/71 - 198/72)  BP(mean): --  RR: 17 (06 Oct 2017 08:28) (16 - 20)  SpO2: 99% (06 Oct 2017 08:28) (94% - 99%)    PHYSICAL EXAM:    General: Well appearing; talking excitingly; enjoying breakfast  HEENT: NC/AT; PERRL, anicteric sclera; MMM  Neck: supple  Cardiovascular: +S1/S2, RRR  Respiratory: CTA B/L; no W/R/R  Gastrointestinal: soft, NT/ND; +BS; Tenderness to palpation below the umbilicus  : suprapubic tenderness; b/l CVA tenderness more prominent on the R   Extremities: WWP; no edema, clubbing or cyanosis  Vascular: 2+ radial, DP/PT pulses B/L  Neurological: AAOx3; no focal deficits    MEDICATIONS:  MEDICATIONS  (STANDING):  atorvastatin 10 milliGRAM(s) Oral at bedtime  dextrose 5%. 1000 milliLiter(s) (50 mL/Hr) IV Continuous <Continuous>  dextrose 50% Injectable 12.5 Gram(s) IV Push once  dextrose 50% Injectable 25 Gram(s) IV Push once  dextrose 50% Injectable 25 Gram(s) IV Push once  insulin glargine Injectable (LANTUS) 10 Unit(s) SubCutaneous every morning  insulin lispro (HumaLOG) corrective regimen sliding scale   SubCutaneous Before meals and at bedtime  pantoprazole    Tablet 40 milliGRAM(s) Oral before breakfast  piperacillin/tazobactam IVPB. 3.375 Gram(s) IV Intermittent every 6 hours    MEDICATIONS  (PRN):  acetaminophen   Tablet. 650 milliGRAM(s) Oral every 6 hours PRN Moderate Pain (4 - 6)  dextrose Gel 1 Dose(s) Oral once PRN Blood Glucose LESS THAN 70 milliGRAM(s)/deciliter  glucagon  Injectable 1 milliGRAM(s) IntraMuscular once PRN Glucose LESS THAN 70 milligrams/deciliter      ALLERGIES:  Allergies    No Known Allergies    Intolerances        LABS:                        9.2    9.2   )-----------( 228      ( 06 Oct 2017 06:14 )             28.5     10-06    134<L>  |  94<L>  |  14  ----------------------------<  290<H>  3.8   |  26  |  0.82    Ca    8.4      06 Oct 2017 06:13  Mg     2.4     10-06    TPro  8.1  /  Alb  3.0<L>  /  TBili  0.3  /  DBili  x   /  AST  22  /  ALT  12  /  AlkPhos  76  10-05    PT/INR - ( 05 Oct 2017 19:52 )   PT: 11.5 sec;   INR: 1.04          PTT - ( 05 Oct 2017 19:52 )  PTT:26.8 sec  Urinalysis Basic - ( 05 Oct 2017 19:52 )    Color: Yellow / Appearance: Clear / SG: <=1.005 / pH: x  Gluc: x / Ketone: Trace mg/dL  / Bili: Negative / Urobili: 0.2 E.U./dL   Blood: x / Protein: NEGATIVE mg/dL / Nitrite: NEGATIVE   Leuk Esterase: Small / RBC: < 5 /HPF / WBC Many /HPF   Sq Epi: x / Non Sq Epi: Rare /HPF / Bacteria: Many /HPF      CAPILLARY BLOOD GLUCOSE  283 (06 Oct 2017 05:57)      Chext X-ray: WNL    CT a/p IV cont 10/5    	IMPRESSION:  	1. Right pyelonephritis.  	2. There is a cluster of small fluid attenuation structures in the   	superior pole the left kidney, laterally,  	the largest of which measures 9 mm in size, with equivocal associated   	local decreased renal  	enhancement, potentially representing pyelonephritis with small   	subcentimeter abscesses.  	Alternatively, this may represent small incidental renal cysts. RECOMMEND   	comparison with any  available prior studies.    10/6  Blood culture CPR: Gram (-) rods: Klebsiella pneumoniae  Blood culture growth plate: No growth after 12 hours

## 2017-10-06 NOTE — PROGRESS NOTE ADULT - PROBLEM SELECTOR PLAN 2
- Blood culture PCR - Pt being managed with Lantus 10 and Novolog 3 Uncontrolled diabetes HgbA1C 10.9%. Came in with hyperglycemia to glucose 472 on BMP, possibly also 2/2 sepsis. On insulin, glipizide at home.  - c/w Lantus 10 qam, 3 premeal, moderate sliding scale

## 2017-10-06 NOTE — CONSULT NOTE ADULT - ATTENDING COMMENTS
73 year old AAF with onset of fever, chills and back pain was found to have an acute pyelonephritis of the right kidney.  Patient also has positive blood cultures for GN Rods.  Placed on Zosyn.  Awaiting final ID of organism and sensitivities.  Patient is febrile, but otherwise stable.  Creatinine is normal..    I agree with Dr. Toribio's note above.

## 2017-10-06 NOTE — PROGRESS NOTE ADULT - PROBLEM SELECTOR PLAN 5
Monitor for elevated BP--> add home med enalapril 40mg if BP becomes elevated Holding BP meds in setting of sepsis. Pt normotensive.  - can add home med enalapril 40mg if BP increasing SBP>180

## 2017-10-06 NOTE — PROGRESS NOTE ADULT - PROBLEM SELECTOR PLAN 7
Plan: dash, diabetic diet.  replete lytes prn. Dash, diabetic diet.  NS 80cc/hr  Replete lytes prn K>4, Mg>2, Phos >2.5.

## 2017-10-07 DIAGNOSIS — I10 ESSENTIAL (PRIMARY) HYPERTENSION: ICD-10-CM

## 2017-10-07 DIAGNOSIS — E11.9 TYPE 2 DIABETES MELLITUS WITHOUT COMPLICATIONS: ICD-10-CM

## 2017-10-07 DIAGNOSIS — A41.4 SEPSIS DUE TO ANAEROBES: ICD-10-CM

## 2017-10-07 LAB
ANION GAP SERPL CALC-SCNC: 11 MMOL/L — SIGNIFICANT CHANGE UP (ref 5–17)
BLD GP AB SCN SERPL QL: NEGATIVE — SIGNIFICANT CHANGE UP
BUN SERPL-MCNC: 9 MG/DL — SIGNIFICANT CHANGE UP (ref 7–23)
CALCIUM SERPL-MCNC: 8.3 MG/DL — LOW (ref 8.4–10.5)
CHLORIDE SERPL-SCNC: 95 MMOL/L — LOW (ref 96–108)
CO2 SERPL-SCNC: 26 MMOL/L — SIGNIFICANT CHANGE UP (ref 22–31)
CREAT SERPL-MCNC: 0.87 MG/DL — SIGNIFICANT CHANGE UP (ref 0.5–1.3)
GLUCOSE SERPL-MCNC: 298 MG/DL — HIGH (ref 70–99)
HCT VFR BLD CALC: 26.9 % — LOW (ref 34.5–45)
HGB BLD-MCNC: 8.7 G/DL — LOW (ref 11.5–15.5)
MAGNESIUM SERPL-MCNC: 1.5 MG/DL — LOW (ref 1.6–2.6)
MCHC RBC-ENTMCNC: 26.1 PG — LOW (ref 27–34)
MCHC RBC-ENTMCNC: 32.3 G/DL — SIGNIFICANT CHANGE UP (ref 32–36)
MCV RBC AUTO: 80.8 FL — SIGNIFICANT CHANGE UP (ref 80–100)
PHOSPHATE SERPL-MCNC: 2.8 MG/DL — SIGNIFICANT CHANGE UP (ref 2.5–4.5)
PLATELET # BLD AUTO: 229 K/UL — SIGNIFICANT CHANGE UP (ref 150–400)
POTASSIUM SERPL-MCNC: 4 MMOL/L — SIGNIFICANT CHANGE UP (ref 3.5–5.3)
POTASSIUM SERPL-SCNC: 4 MMOL/L — SIGNIFICANT CHANGE UP (ref 3.5–5.3)
RBC # BLD: 3.33 M/UL — LOW (ref 3.8–5.2)
RBC # FLD: 13.1 % — SIGNIFICANT CHANGE UP (ref 10.3–16.9)
RH IG SCN BLD-IMP: POSITIVE — SIGNIFICANT CHANGE UP
SODIUM SERPL-SCNC: 132 MMOL/L — LOW (ref 135–145)
WBC # BLD: 9.3 K/UL — SIGNIFICANT CHANGE UP (ref 3.8–10.5)
WBC # FLD AUTO: 9.3 K/UL — SIGNIFICANT CHANGE UP (ref 3.8–10.5)

## 2017-10-07 RX ORDER — INSULIN GLARGINE 100 [IU]/ML
10 INJECTION, SOLUTION SUBCUTANEOUS EVERY MORNING
Qty: 0 | Refills: 0 | Status: DISCONTINUED | OUTPATIENT
Start: 2017-10-07 | End: 2017-10-09

## 2017-10-07 RX ORDER — MAGNESIUM SULFATE 500 MG/ML
4 VIAL (ML) INJECTION ONCE
Qty: 0 | Refills: 0 | Status: COMPLETED | OUTPATIENT
Start: 2017-10-07 | End: 2017-10-07

## 2017-10-07 RX ADMIN — PANTOPRAZOLE SODIUM 40 MILLIGRAM(S): 20 TABLET, DELAYED RELEASE ORAL at 06:06

## 2017-10-07 RX ADMIN — PIPERACILLIN AND TAZOBACTAM 200 GRAM(S): 4; .5 INJECTION, POWDER, LYOPHILIZED, FOR SOLUTION INTRAVENOUS at 22:29

## 2017-10-07 RX ADMIN — Medication 3 UNIT(S): at 12:18

## 2017-10-07 RX ADMIN — INSULIN GLARGINE 10 UNIT(S): 100 INJECTION, SOLUTION SUBCUTANEOUS at 09:20

## 2017-10-07 RX ADMIN — SODIUM CHLORIDE 80 MILLILITER(S): 9 INJECTION INTRAMUSCULAR; INTRAVENOUS; SUBCUTANEOUS at 09:14

## 2017-10-07 RX ADMIN — Medication 2: at 12:19

## 2017-10-07 RX ADMIN — Medication 650 MILLIGRAM(S): at 06:06

## 2017-10-07 RX ADMIN — PIPERACILLIN AND TAZOBACTAM 200 GRAM(S): 4; .5 INJECTION, POWDER, LYOPHILIZED, FOR SOLUTION INTRAVENOUS at 04:13

## 2017-10-07 RX ADMIN — SODIUM CHLORIDE 80 MILLILITER(S): 9 INJECTION INTRAMUSCULAR; INTRAVENOUS; SUBCUTANEOUS at 07:02

## 2017-10-07 RX ADMIN — ATORVASTATIN CALCIUM 10 MILLIGRAM(S): 80 TABLET, FILM COATED ORAL at 22:29

## 2017-10-07 RX ADMIN — Medication 6: at 22:44

## 2017-10-07 RX ADMIN — PIPERACILLIN AND TAZOBACTAM 200 GRAM(S): 4; .5 INJECTION, POWDER, LYOPHILIZED, FOR SOLUTION INTRAVENOUS at 09:22

## 2017-10-07 RX ADMIN — Medication 50 GRAM(S): at 06:58

## 2017-10-07 RX ADMIN — Medication 3 UNIT(S): at 09:15

## 2017-10-07 RX ADMIN — Medication 6: at 09:15

## 2017-10-07 RX ADMIN — Medication 3 UNIT(S): at 17:47

## 2017-10-07 RX ADMIN — PIPERACILLIN AND TAZOBACTAM 200 GRAM(S): 4; .5 INJECTION, POWDER, LYOPHILIZED, FOR SOLUTION INTRAVENOUS at 16:48

## 2017-10-07 NOTE — PROGRESS NOTE ADULT - SUBJECTIVE AND OBJECTIVE BOX
Pt seen and examined nO COMPLAINTS.  nO FEVER, NO CHILLS, No dysuria    REVIEW OF SYSTEMS:  Constitutional: No fever, weight loss or fatigue  Cardiovascular: No chest pain, palpitations, dizziness or leg swelling  Gastrointestinal: No abdominal or epigastric pain. No nausea, vomiting or hematemesis; No diarrhea or constipation. No melena or hematochezia.  Skin: No itching, burning, rashes or lesions       MEDICATIONS:  MEDICATIONS  (STANDING):  atorvastatin 10 milliGRAM(s) Oral at bedtime  dextrose 5%. 1000 milliLiter(s) (50 mL/Hr) IV Continuous <Continuous>  dextrose 50% Injectable 12.5 Gram(s) IV Push once  dextrose 50% Injectable 25 Gram(s) IV Push once  dextrose 50% Injectable 25 Gram(s) IV Push once  insulin glargine Injectable (LANTUS) 10 Unit(s) SubCutaneous every morning  insulin lispro (HumaLOG) corrective regimen sliding scale   SubCutaneous Before meals and at bedtime  insulin lispro Injectable (HumaLOG) 3 Unit(s) SubCutaneous three times a day before meals  pantoprazole    Tablet 40 milliGRAM(s) Oral before breakfast  piperacillin/tazobactam IVPB. 3.375 Gram(s) IV Intermittent every 6 hours  sodium chloride 0.9%. 1000 milliLiter(s) (80 mL/Hr) IV Continuous <Continuous>    MEDICATIONS  (PRN):  acetaminophen   Tablet 650 milliGRAM(s) Oral every 6 hours PRN For Temp greater than 38 C (100.4 F)  acetaminophen   Tablet. 650 milliGRAM(s) Oral every 6 hours PRN Moderate Pain (4 - 6)  dextrose Gel 1 Dose(s) Oral once PRN Blood Glucose LESS THAN 70 milliGRAM(s)/deciliter  glucagon  Injectable 1 milliGRAM(s) IntraMuscular once PRN Glucose LESS THAN 70 milligrams/deciliter      Allergies    No Known Allergies    Intolerances        Vital Signs Last 24 Hrs  T(C): 36.8 (07 Oct 2017 08:27), Max: 38.7 (06 Oct 2017 15:48)  T(F): 98.2 (07 Oct 2017 08:27), Max: 101.7 (06 Oct 2017 15:48)  HR: 85 (07 Oct 2017 08:27) (85 - 106)  BP: 126/69 (07 Oct 2017 08:27) (126/69 - 158/74)  BP(mean): --  RR: 18 (07 Oct 2017 08:27) (17 - 18)  SpO2: 96% (07 Oct 2017 08:27) (95% - 99%)    10-06 @ 07:01  -  10-07 @ 07:00  --------------------------------------------------------  IN: 1020 mL / OUT: 0 mL / NET: 1020 mL    10-07 @ 07:01  -  10-07 @ 13:24  --------------------------------------------------------  IN: 420 mL / OUT: 0 mL / NET: 420 mL        PHYSICAL EXAM:    General: in no acute distress  HEENT: MMM, conjunctiva and sclera clear  Lungs: clear  Heart: regular  Gastrointestinal: Soft non-tender non-distended; Normal bowel sounds; No hepatosplenomegaly  Skin: Warm and dry. No obvious rash    LABS:      CBC Full  -  ( 07 Oct 2017 05:28 )  WBC Count : 9.3 K/uL  Hemoglobin : 8.7 g/dL  Hematocrit : 26.9 %  Platelet Count - Automated : 229 K/uL  Mean Cell Volume : 80.8 fL  Mean Cell Hemoglobin : 26.1 pg  Mean Cell Hemoglobin Concentration : 32.3 g/dL  Auto Neutrophil # : x  Auto Lymphocyte # : x  Auto Monocyte # : x  Auto Eosinophil # : x  Auto Basophil # : x  Auto Neutrophil % : x  Auto Lymphocyte % : x  Auto Monocyte % : x  Auto Eosinophil % : x  Auto Basophil % : x    10-07    132<L>  |  95<L>  |  9   ----------------------------<  298<H>  4.0   |  26  |  0.87    Ca    8.3<L>      07 Oct 2017 05:28  Phos  2.8     10-07  Mg     1.5     10-07    TPro  6.1  /  Alb  2.5<L>  /  TBili  0.2  /  DBili  <0.2  /  AST  16  /  ALT  8<L>  /  AlkPhos  62  10-06    PT/INR - ( 05 Oct 2017 19:52 )   PT: 11.5 sec;   INR: 1.04          PTT - ( 05 Oct 2017 19:52 )  PTT:26.8 sec      Urinalysis Basic - ( 05 Oct 2017 19:52 )    Color: Yellow / Appearance: Clear / SG: <=1.005 / pH: x  Gluc: x / Ketone: Trace mg/dL  / Bili: Negative / Urobili: 0.2 E.U./dL   Blood: x / Protein: NEGATIVE mg/dL / Nitrite: NEGATIVE   Leuk Esterase: Small / RBC: < 5 /HPF / WBC Many /HPF   Sq Epi: x / Non Sq Epi: Rare /HPF / Bacteria: Many /HPF        Culture - Blood (10.05.17 @ 20:52)    -  Klebsiella pneumoniae: Detec    Gram Stain:   Anaerobic Bottle: Gram Negative Rods  Result called to and read back by_ Ms. ANDRESSA Banks RN  10/06/2017 10:02:39  ***Blood Panel PCR results on this specimen are available  approximately 3 hours after the Gram stain result.***  Gram stain, PCR, and/or culture results may not always  correspond due to difference in methodologies.  ************************************************************  This PCR assay was performed using [email or web address].  The following targets are tested for: Enterococcus,  vancomycin resistant enterococci, Listeria monocytogenes,  coagulase negative staphylococci, S. aureus,  methicillin resistant S. aureus, Streptococcus agalactiae  (Group B), S. pneumoniae, S. pyogenes (Group A),  Acinetobacter baumannii, Enterobacter cloacae, E. coli,  Klebsiella oxytoca, K. pneumoniae, Proteus sp.,  Serratia marcescens, Haemophilus influenzae,  Neisseria meningitidis, Pseudomonas aeruginosa, Candida  albicans, C. glabrata, C krusei, C parapsilosis,  C. tropicalis and the KPC resistance gene.    Specimen Source: .Blood Blood    Organism: Blood Culture PCR    Culture Results:   Growth in anaerobic bottle: Gram Negative Rods Identification and  susceptibility to follow.  Culture in progress    Organism Identification: Blood Culture PCR    Method Type: PCR            RADIOLOGY & ADDITIONAL STUDIES (The following images were personally reviewed):

## 2017-10-07 NOTE — PROGRESS NOTE ADULT - SUBJECTIVE AND OBJECTIVE BOX
INTERVAL HPI/OVERNIGHT EVENTS:  72 year old BF with DM, HTN admitted for pyelonephritis, currently on antibiotic and no pain.   MEDICATIONS  (STANDING):  atorvastatin 10 milliGRAM(s) Oral at bedtime  dextrose 5%. 1000 milliLiter(s) (50 mL/Hr) IV Continuous <Continuous>  dextrose 50% Injectable 12.5 Gram(s) IV Push once  dextrose 50% Injectable 25 Gram(s) IV Push once  dextrose 50% Injectable 25 Gram(s) IV Push once  insulin glargine Injectable (LANTUS) 10 Unit(s) SubCutaneous every morning  insulin lispro (HumaLOG) corrective regimen sliding scale   SubCutaneous Before meals and at bedtime  insulin lispro Injectable (HumaLOG) 3 Unit(s) SubCutaneous three times a day before meals  pantoprazole    Tablet 40 milliGRAM(s) Oral before breakfast  piperacillin/tazobactam IVPB. 3.375 Gram(s) IV Intermittent every 6 hours  sodium chloride 0.9%. 1000 milliLiter(s) (80 mL/Hr) IV Continuous <Continuous>    MEDICATIONS  (PRN):  acetaminophen   Tablet 650 milliGRAM(s) Oral every 6 hours PRN For Temp greater than 38 C (100.4 F)  acetaminophen   Tablet. 650 milliGRAM(s) Oral every 6 hours PRN Moderate Pain (4 - 6)  dextrose Gel 1 Dose(s) Oral once PRN Blood Glucose LESS THAN 70 milliGRAM(s)/deciliter  glucagon  Injectable 1 milliGRAM(s) IntraMuscular once PRN Glucose LESS THAN 70 milligrams/deciliter      Allergies    No Known Allergies    Intolerances        REVIEW OF SYSTEMS  General:	  Respiratory and Thorax:  Cardiovascular:	  Gastrointestinal:	  Genitourinary:	  Musculoskeletal:	  Neurological:	  Psychiatric:	  Hematology/Lymphatics:	  Endocrine:	  Allergic/Immunologi    Vital Signs Last 24 Hrs  T(C): 36.8 (07 Oct 2017 08:27), Max: 38.7 (06 Oct 2017 15:48)  T(F): 98.2 (07 Oct 2017 08:27), Max: 101.7 (06 Oct 2017 15:48)  HR: 85 (07 Oct 2017 08:27) (85 - 106)  BP: 126/69 (07 Oct 2017 08:27) (126/69 - 158/74)  BP(mean): --  RR: 18 (07 Oct 2017 08:27) (17 - 18)  SpO2: 96% (07 Oct 2017 08:27) (95% - 99%)  I&O's Summary    06 Oct 2017 07:01  -  07 Oct 2017 07:00  --------------------------------------------------------  IN: 1020 mL / OUT: 0 mL / NET: 1020 mL    07 Oct 2017 07:01  -  07 Oct 2017 10:41  --------------------------------------------------------  IN: 80 mL / OUT: 0 mL / NET: 80 mL        10-06 @ 07:01  -  10-07 @ 07:00  --------------------------------------------------------  IN:    IV PiggyBack: 300 mL    sodium chloride 0.9%.: 720 mL  Total IN: 1020 mL    OUT:  Total OUT: 0 mL    Total NET: 1020 mL      10-07 @ 07:01  -  10-07 @ 10:41  --------------------------------------------------------  IN:    sodium chloride 0.9%.: 80 mL  Total IN: 80 mL    OUT:  Total OUT: 0 mL    Total NET: 80 mL        physical exam    pulmonary: rale,   cardio : RR,   ABD: soft, BS   Ext: - edema     LABS:                        8.7    9.3   )-----------( 229      ( 07 Oct 2017 05:28 )             26.9     10-07    132<L>  |  95<L>  |  9   ----------------------------<  298<H>  4.0   |  26  |  0.87    Ca    8.3<L>      07 Oct 2017 05:28  Phos  2.8     10-07  Mg     1.5     10-07    TPro  6.1  /  Alb  2.5<L>  /  TBili  0.2  /  DBili  <0.2  /  AST  16  /  ALT  8<L>  /  AlkPhos  62  10-06    PT/INR - ( 05 Oct 2017 19:52 )   PT: 11.5 sec;   INR: 1.04          PTT - ( 05 Oct 2017 19:52 )  PTT:26.8 sec  Urinalysis Basic - ( 05 Oct 2017 19:52 )    Color: Yellow / Appearance: Clear / SG: <=1.005 / pH: x  Gluc: x / Ketone: Trace mg/dL  / Bili: Negative / Urobili: 0.2 E.U./dL   Blood: x / Protein: NEGATIVE mg/dL / Nitrite: NEGATIVE   Leuk Esterase: Small / RBC: < 5 /HPF / WBC Many /HPF   Sq Epi: x / Non Sq Epi: Rare /HPF / Bacteria: Many /HPF      RADIOLOGY & ADDITIONAL TESTS:

## 2017-10-08 LAB
-  AMPICILLIN/SULBACTAM: SIGNIFICANT CHANGE UP
-  AMPICILLIN/SULBACTAM: SIGNIFICANT CHANGE UP
-  AMPICILLIN: SIGNIFICANT CHANGE UP
-  AMPICILLIN: SIGNIFICANT CHANGE UP
-  CEFAZOLIN: SIGNIFICANT CHANGE UP
-  CEFAZOLIN: SIGNIFICANT CHANGE UP
-  CEFTRIAXONE: SIGNIFICANT CHANGE UP
-  CEFTRIAXONE: SIGNIFICANT CHANGE UP
-  CIPROFLOXACIN: SIGNIFICANT CHANGE UP
-  CIPROFLOXACIN: SIGNIFICANT CHANGE UP
-  GENTAMICIN: SIGNIFICANT CHANGE UP
-  GENTAMICIN: SIGNIFICANT CHANGE UP
-  NITROFURANTOIN: SIGNIFICANT CHANGE UP
-  PIPERACILLIN/TAZOBACTAM: SIGNIFICANT CHANGE UP
-  PIPERACILLIN/TAZOBACTAM: SIGNIFICANT CHANGE UP
-  TOBRAMYCIN: SIGNIFICANT CHANGE UP
-  TOBRAMYCIN: SIGNIFICANT CHANGE UP
-  TRIMETHOPRIM/SULFAMETHOXAZOLE: SIGNIFICANT CHANGE UP
-  TRIMETHOPRIM/SULFAMETHOXAZOLE: SIGNIFICANT CHANGE UP
ANION GAP SERPL CALC-SCNC: 13 MMOL/L — SIGNIFICANT CHANGE UP (ref 5–17)
BASOPHILS NFR BLD AUTO: 0.1 % — SIGNIFICANT CHANGE UP (ref 0–2)
BLD GP AB SCN SERPL QL: NEGATIVE — SIGNIFICANT CHANGE UP
BUN SERPL-MCNC: 9 MG/DL — SIGNIFICANT CHANGE UP (ref 7–23)
CALCIUM SERPL-MCNC: 8.9 MG/DL — SIGNIFICANT CHANGE UP (ref 8.4–10.5)
CHLORIDE SERPL-SCNC: 101 MMOL/L — SIGNIFICANT CHANGE UP (ref 96–108)
CO2 SERPL-SCNC: 26 MMOL/L — SIGNIFICANT CHANGE UP (ref 22–31)
CREAT SERPL-MCNC: 0.8 MG/DL — SIGNIFICANT CHANGE UP (ref 0.5–1.3)
CULTURE RESULTS: SIGNIFICANT CHANGE UP
EOSINOPHIL NFR BLD AUTO: 0.9 % — SIGNIFICANT CHANGE UP (ref 0–6)
GLUCOSE SERPL-MCNC: 136 MG/DL — HIGH (ref 70–99)
HCT VFR BLD CALC: 29 % — LOW (ref 34.5–45)
HGB BLD-MCNC: 9.4 G/DL — LOW (ref 11.5–15.5)
LYMPHOCYTES # BLD AUTO: 17.2 % — SIGNIFICANT CHANGE UP (ref 13–44)
MAGNESIUM SERPL-MCNC: 1.9 MG/DL — SIGNIFICANT CHANGE UP (ref 1.6–2.6)
MCHC RBC-ENTMCNC: 26.2 PG — LOW (ref 27–34)
MCHC RBC-ENTMCNC: 32.4 G/DL — SIGNIFICANT CHANGE UP (ref 32–36)
MCV RBC AUTO: 80.8 FL — SIGNIFICANT CHANGE UP (ref 80–100)
METHOD TYPE: SIGNIFICANT CHANGE UP
METHOD TYPE: SIGNIFICANT CHANGE UP
MONOCYTES NFR BLD AUTO: 10.5 % — SIGNIFICANT CHANGE UP (ref 2–14)
NEUTROPHILS NFR BLD AUTO: 71.3 % — SIGNIFICANT CHANGE UP (ref 43–77)
ORGANISM # SPEC MICROSCOPIC CNT: SIGNIFICANT CHANGE UP
ORGANISM # SPEC MICROSCOPIC CNT: SIGNIFICANT CHANGE UP
PLATELET # BLD AUTO: 265 K/UL — SIGNIFICANT CHANGE UP (ref 150–400)
POTASSIUM SERPL-MCNC: 4 MMOL/L — SIGNIFICANT CHANGE UP (ref 3.5–5.3)
POTASSIUM SERPL-SCNC: 4 MMOL/L — SIGNIFICANT CHANGE UP (ref 3.5–5.3)
RBC # BLD: 3.59 M/UL — LOW (ref 3.8–5.2)
RBC # FLD: 13.1 % — SIGNIFICANT CHANGE UP (ref 10.3–16.9)
RH IG SCN BLD-IMP: POSITIVE — SIGNIFICANT CHANGE UP
SODIUM SERPL-SCNC: 140 MMOL/L — SIGNIFICANT CHANGE UP (ref 135–145)
SPECIMEN SOURCE: SIGNIFICANT CHANGE UP
WBC # BLD: 7.5 K/UL — SIGNIFICANT CHANGE UP (ref 3.8–10.5)
WBC # FLD AUTO: 7.5 K/UL — SIGNIFICANT CHANGE UP (ref 3.8–10.5)

## 2017-10-08 RX ADMIN — Medication 3 UNIT(S): at 17:49

## 2017-10-08 RX ADMIN — Medication 2: at 12:28

## 2017-10-08 RX ADMIN — PIPERACILLIN AND TAZOBACTAM 200 GRAM(S): 4; .5 INJECTION, POWDER, LYOPHILIZED, FOR SOLUTION INTRAVENOUS at 04:26

## 2017-10-08 RX ADMIN — PIPERACILLIN AND TAZOBACTAM 200 GRAM(S): 4; .5 INJECTION, POWDER, LYOPHILIZED, FOR SOLUTION INTRAVENOUS at 15:22

## 2017-10-08 RX ADMIN — PANTOPRAZOLE SODIUM 40 MILLIGRAM(S): 20 TABLET, DELAYED RELEASE ORAL at 06:19

## 2017-10-08 RX ADMIN — Medication 650 MILLIGRAM(S): at 10:05

## 2017-10-08 RX ADMIN — Medication 650 MILLIGRAM(S): at 22:30

## 2017-10-08 RX ADMIN — ATORVASTATIN CALCIUM 10 MILLIGRAM(S): 80 TABLET, FILM COATED ORAL at 21:59

## 2017-10-08 RX ADMIN — PIPERACILLIN AND TAZOBACTAM 200 GRAM(S): 4; .5 INJECTION, POWDER, LYOPHILIZED, FOR SOLUTION INTRAVENOUS at 21:59

## 2017-10-08 RX ADMIN — Medication 2: at 22:41

## 2017-10-08 RX ADMIN — SODIUM CHLORIDE 80 MILLILITER(S): 9 INJECTION INTRAMUSCULAR; INTRAVENOUS; SUBCUTANEOUS at 08:44

## 2017-10-08 RX ADMIN — INSULIN GLARGINE 10 UNIT(S): 100 INJECTION, SOLUTION SUBCUTANEOUS at 08:43

## 2017-10-08 RX ADMIN — Medication 3 UNIT(S): at 12:27

## 2017-10-08 RX ADMIN — Medication 650 MILLIGRAM(S): at 04:37

## 2017-10-08 RX ADMIN — Medication 650 MILLIGRAM(S): at 21:59

## 2017-10-08 RX ADMIN — Medication 3 UNIT(S): at 08:43

## 2017-10-08 RX ADMIN — PIPERACILLIN AND TAZOBACTAM 200 GRAM(S): 4; .5 INJECTION, POWDER, LYOPHILIZED, FOR SOLUTION INTRAVENOUS at 09:57

## 2017-10-08 RX ADMIN — Medication 6: at 17:49

## 2017-10-08 RX ADMIN — SODIUM CHLORIDE 80 MILLILITER(S): 9 INJECTION INTRAMUSCULAR; INTRAVENOUS; SUBCUTANEOUS at 04:28

## 2017-10-08 RX ADMIN — Medication 650 MILLIGRAM(S): at 11:05

## 2017-10-08 NOTE — PROGRESS NOTE ADULT - SUBJECTIVE AND OBJECTIVE BOX
INTERVAL HPI/OVERNIGHT EVENTS:  72 year old BF with DM, HTN admitted for pyelonephritis on antibiotics.  MEDICATIONS  (STANDING):  atorvastatin 10 milliGRAM(s) Oral at bedtime  dextrose 5%. 1000 milliLiter(s) (50 mL/Hr) IV Continuous <Continuous>  dextrose 50% Injectable 12.5 Gram(s) IV Push once  dextrose 50% Injectable 25 Gram(s) IV Push once  dextrose 50% Injectable 25 Gram(s) IV Push once  insulin glargine Injectable (LANTUS) 10 Unit(s) SubCutaneous every morning  insulin lispro (HumaLOG) corrective regimen sliding scale   SubCutaneous Before meals and at bedtime  insulin lispro Injectable (HumaLOG) 3 Unit(s) SubCutaneous three times a day before meals  pantoprazole    Tablet 40 milliGRAM(s) Oral before breakfast  piperacillin/tazobactam IVPB. 3.375 Gram(s) IV Intermittent every 6 hours  sodium chloride 0.9%. 1000 milliLiter(s) (80 mL/Hr) IV Continuous <Continuous>    MEDICATIONS  (PRN):  acetaminophen   Tablet 650 milliGRAM(s) Oral every 6 hours PRN For Temp greater than 38 C (100.4 F)  acetaminophen   Tablet. 650 milliGRAM(s) Oral every 6 hours PRN Moderate Pain (4 - 6)  dextrose Gel 1 Dose(s) Oral once PRN Blood Glucose LESS THAN 70 milliGRAM(s)/deciliter  glucagon  Injectable 1 milliGRAM(s) IntraMuscular once PRN Glucose LESS THAN 70 milligrams/deciliter      Allergies    No Known Allergies    Intolerances        REVIEW OF SYSTEMS  General:	  Respiratory and Thorax:  Cardiovascular:	  Gastrointestinal:	  Genitourinary:	  Musculoskeletal:	  Neurological:	  Psychiatric:	  Hematology/Lymphatics:	  Endocrine:	  Allergic/Immunologi    Vital Signs Last 24 Hrs  T(C): 37 (08 Oct 2017 08:29), Max: 37.6 (07 Oct 2017 21:12)  T(F): 98.6 (08 Oct 2017 08:29), Max: 99.6 (07 Oct 2017 21:12)  HR: 63 (08 Oct 2017 08:29) (63 - 90)  BP: 152/60 (08 Oct 2017 08:29) (143/67 - 169/74)  BP(mean): --  RR: 16 (08 Oct 2017 08:29) (16 - 18)  SpO2: 96% (08 Oct 2017 08:29) (93% - 96%)  I&O's Summary    07 Oct 2017 07:01  -  08 Oct 2017 07:00  --------------------------------------------------------  IN: 1160 mL / OUT: 0 mL / NET: 1160 mL    08 Oct 2017 07:01  -  08 Oct 2017 11:07  --------------------------------------------------------  IN: 240 mL / OUT: 0 mL / NET: 240 mL        10-07 @ 07:01  -  10-08 @ 07:00  --------------------------------------------------------  IN:    IV PiggyBack: 200 mL    sodium chloride 0.9%.: 960 mL  Total IN: 1160 mL    OUT:  Total OUT: 0 mL    Total NET: 1160 mL      10-08 @ 07:01  -  10-08 @ 11:07  --------------------------------------------------------  IN:    sodium chloride 0.9%.: 240 mL  Total IN: 240 mL    OUT:  Total OUT: 0 mL    Total NET: 240 mL        physical exam    pulmonary: rale, rhonchi  cardio : RR, Murmur  ABD: soft, BS   Ext: edema     LABS:                        9.4    7.5   )-----------( 265      ( 08 Oct 2017 07:59 )             29.0     10-08    140  |  101  |  9   ----------------------------<  136<H>  4.0   |  26  |  0.80    Ca    8.9      08 Oct 2017 07:59  Phos  2.8     10-07  Mg     1.9     10-08          RADIOLOGY & ADDITIONAL TESTS: INTERVAL HPI/OVERNIGHT EVENTS:  72 year old BF with DM, HTN admitted for pyelonephritis on antibiotics.  MEDICATIONS  (STANDING):  atorvastatin 10 milliGRAM(s) Oral at bedtime  dextrose 5%. 1000 milliLiter(s) (50 mL/Hr) IV Continuous <Continuous>  dextrose 50% Injectable 12.5 Gram(s) IV Push once  dextrose 50% Injectable 25 Gram(s) IV Push once  dextrose 50% Injectable 25 Gram(s) IV Push once  insulin glargine Injectable (LANTUS) 10 Unit(s) SubCutaneous every morning  insulin lispro (HumaLOG) corrective regimen sliding scale   SubCutaneous Before meals and at bedtime  insulin lispro Injectable (HumaLOG) 3 Unit(s) SubCutaneous three times a day before meals  pantoprazole    Tablet 40 milliGRAM(s) Oral before breakfast  piperacillin/tazobactam IVPB. 3.375 Gram(s) IV Intermittent every 6 hours  sodium chloride 0.9%. 1000 milliLiter(s) (80 mL/Hr) IV Continuous <Continuous>    MEDICATIONS  (PRN):  acetaminophen   Tablet 650 milliGRAM(s) Oral every 6 hours PRN For Temp greater than 38 C (100.4 F)  acetaminophen   Tablet. 650 milliGRAM(s) Oral every 6 hours PRN Moderate Pain (4 - 6)  dextrose Gel 1 Dose(s) Oral once PRN Blood Glucose LESS THAN 70 milliGRAM(s)/deciliter  glucagon  Injectable 1 milliGRAM(s) IntraMuscular once PRN Glucose LESS THAN 70 milligrams/deciliter      Allergies    No Known Allergies    Intolerances        REVIEW OF SYSTEMS  General:	  Respiratory and Thorax:  Cardiovascular:	  Gastrointestinal:	  Genitourinary:	  Musculoskeletal:	  Neurological:	  Psychiatric:	  Hematology/Lymphatics:	  Endocrine:	  Allergic/Immunologi    Vital Signs Last 24 Hrs  T(C): 37 (08 Oct 2017 08:29), Max: 37.6 (07 Oct 2017 21:12)  T(F): 98.6 (08 Oct 2017 08:29), Max: 99.6 (07 Oct 2017 21:12)  HR: 63 (08 Oct 2017 08:29) (63 - 90)  BP: 152/60 (08 Oct 2017 08:29) (143/67 - 169/74)  BP(mean): --  RR: 16 (08 Oct 2017 08:29) (16 - 18)  SpO2: 96% (08 Oct 2017 08:29) (93% - 96%)  I&O's Summary    07 Oct 2017 07:01  -  08 Oct 2017 07:00  --------------------------------------------------------  IN: 1160 mL / OUT: 0 mL / NET: 1160 mL    08 Oct 2017 07:01  -  08 Oct 2017 11:07  --------------------------------------------------------  IN: 240 mL / OUT: 0 mL / NET: 240 mL        10-07 @ 07:01  -  10-08 @ 07:00  --------------------------------------------------------  IN:    IV PiggyBack: 200 mL    sodium chloride 0.9%.: 960 mL  Total IN: 1160 mL    OUT:  Total OUT: 0 mL    Total NET: 1160 mL      10-08 @ 07:01  -  10-08 @ 11:07  --------------------------------------------------------  IN:    sodium chloride 0.9%.: 240 mL  Total IN: 240 mL    OUT:  Total OUT: 0 mL    Total NET: 240 mL        physical exam    pulmonary: clear to A  cardio : RR,   ABD: soft, BS   Ext:  no edema    LABS:                        9.4    7.5   )-----------( 265      ( 08 Oct 2017 07:59 )             29.0     10-08    140  |  101  |  9   ----------------------------<  136<H>  4.0   |  26  |  0.80    Ca    8.9      08 Oct 2017 07:59  Phos  2.8     10-07  Mg     1.9     10-08          RADIOLOGY & ADDITIONAL TESTS:

## 2017-10-08 NOTE — PROGRESS NOTE ADULT - SUBJECTIVE AND OBJECTIVE BOX
OVERNIGHT EVENTS: No acute events overnight    SUBJECTIVE / INTERVAL HPI: Patient seen and examined at bedside. No acute complaints this morning, denies N/V, CP, SOB, dysuria, abdominal pain    Review of systems negative except as noted above.     VITAL SIGNS:  Vital Signs Last 24 Hrs  T(C): 37 (08 Oct 2017 08:29), Max: 37.6 (07 Oct 2017 21:12)  T(F): 98.6 (08 Oct 2017 08:29), Max: 99.6 (07 Oct 2017 21:12)  HR: 63 (08 Oct 2017 08:29) (63 - 90)  BP: 152/60 (08 Oct 2017 08:29) (143/67 - 169/74)  BP(mean): --  RR: 16 (08 Oct 2017 08:29) (16 - 18)  SpO2: 96% (08 Oct 2017 08:29) (93% - 96%)      10-07-17 @ 07:01  -  10-08-17 @ 07:00  --------------------------------------------------------  IN: 1160 mL / OUT: 0 mL / NET: 1160 mL    10-08-17 @ 07:01  -  10-08-17 @ 11:14  --------------------------------------------------------  IN: 240 mL / OUT: 0 mL / NET: 240 mL        PHYSICAL EXAM:    General: WDWN elderly F in NAD, tired  HEENT: NC/AT; anicteric sclera  Neck: supple, no JVD appreciated  Cardiovascular: +S1/S2; RRR, no M/R/G appreciated  Respiratory: CTA B/L; no W/R/R  Gastrointestinal: soft, NT/ND; +BS. Does have some mild diffuse abdominal tenderness upon CVA exam  Extremities: WWP; no clubbing or cyanosis  Vascular: 2+ radial, DP pulses B/L  Neurological: AOx2 (time not assessed); no focal deficits    MEDICATIONS:  MEDICATIONS  (STANDING):  atorvastatin 10 milliGRAM(s) Oral at bedtime  dextrose 5%. 1000 milliLiter(s) (50 mL/Hr) IV Continuous <Continuous>  dextrose 50% Injectable 12.5 Gram(s) IV Push once  dextrose 50% Injectable 25 Gram(s) IV Push once  dextrose 50% Injectable 25 Gram(s) IV Push once  insulin glargine Injectable (LANTUS) 10 Unit(s) SubCutaneous every morning  insulin lispro (HumaLOG) corrective regimen sliding scale   SubCutaneous Before meals and at bedtime  insulin lispro Injectable (HumaLOG) 3 Unit(s) SubCutaneous three times a day before meals  pantoprazole    Tablet 40 milliGRAM(s) Oral before breakfast  piperacillin/tazobactam IVPB. 3.375 Gram(s) IV Intermittent every 6 hours  sodium chloride 0.9%. 1000 milliLiter(s) (80 mL/Hr) IV Continuous <Continuous>    MEDICATIONS  (PRN):  acetaminophen   Tablet 650 milliGRAM(s) Oral every 6 hours PRN For Temp greater than 38 C (100.4 F)  acetaminophen   Tablet. 650 milliGRAM(s) Oral every 6 hours PRN Moderate Pain (4 - 6)  dextrose Gel 1 Dose(s) Oral once PRN Blood Glucose LESS THAN 70 milliGRAM(s)/deciliter  glucagon  Injectable 1 milliGRAM(s) IntraMuscular once PRN Glucose LESS THAN 70 milligrams/deciliter      ALLERGIES:  Allergies    No Known Allergies    Intolerances        LABS:                        9.4    7.5   )-----------( 265      ( 08 Oct 2017 07:59 )             29.0     10-08    140  |  101  |  9   ----------------------------<  136<H>  4.0   |  26  |  0.80    Ca    8.9      08 Oct 2017 07:59  Phos  2.8     10-07  Mg     1.9     10-08          CAPILLARY BLOOD GLUCOSE  130 (08 Oct 2017 07:48)              RADIOLOGY & ADDITIONAL TESTS: Reviewed.

## 2017-10-08 NOTE — PROGRESS NOTE ADULT - PROBLEM SELECTOR PLAN 2
due to above.   -will address problem 1 - Likely due to infection  - Monitor for resolution to baseline, consider PT consult if not at baseline

## 2017-10-08 NOTE — PROGRESS NOTE ADULT - PROBLEM SELECTOR PLAN 4
likely in setting of infection, and pt not taking meds today due to illness.  improving after 10 U regular insulin  -will give 10 U lantus in AM, FSG qid, ISS  -manage pyelo  -restart statin for dm - Likely in setting of infection, and pt not taking meds on day of admission due to illness.   - Improved control today, F/u FSG  - Lantus 10U in am, Lipitor 10mg qhs

## 2017-10-08 NOTE — PROGRESS NOTE ADULT - ASSESSMENT
72F PMH HTN, T2D presented with 1 wk weakness, dysuria, malodorous urine x 3 week. CT showed R-sided pyelonephritis and potential L-sided subcentimenter abscess. Blood culture PCR + for bacteremia with Klebsiella. On Zosyn day 3

## 2017-10-08 NOTE — PROGRESS NOTE ADULT - PROBLEM SELECTOR PLAN 1
- Coverage with zosyn (day 3), klebsiella per cultures. Will await ID recs  - WBC downtrending, 7.5 today  -   -will consult IR or urology if pt does not improve or decompensates given possible abscesses - Coverage with zosyn (day 3), klebsiella per cultures. Will await ID recs  - CT showed R pyelonephritis, clinically improving, WBC downtrending, 7.5 today  - F/u CBC, fever curve

## 2017-10-08 NOTE — PROGRESS NOTE ADULT - PROBLEM SELECTOR PLAN 8
betaxolol HCL not in formulary.  no alternatives.  -have family bring solution - Betaxolol HCL not in formulary.   - Family bringing medication

## 2017-10-08 NOTE — PROGRESS NOTE ADULT - SUBJECTIVE AND OBJECTIVE BOX
Pt seen and examined  No complaints.  no fever, no chills, no flank pain, no dysuria    REVIEW OF SYSTEMS:  Constitutional: No fever, weight loss or fatigue  Cardiovascular: No chest pain, palpitations, dizziness or leg swelling  Gastrointestinal: No abdominal or epigastric pain. No nausea, vomiting or hematemesis; No diarrhea or constipation. No melena or hematochezia.  Skin: No itching, burning, rashes or lesions       MEDICATIONS:  MEDICATIONS  (STANDING):  atorvastatin 10 milliGRAM(s) Oral at bedtime  dextrose 5%. 1000 milliLiter(s) (50 mL/Hr) IV Continuous <Continuous>  dextrose 50% Injectable 12.5 Gram(s) IV Push once  dextrose 50% Injectable 25 Gram(s) IV Push once  dextrose 50% Injectable 25 Gram(s) IV Push once  insulin glargine Injectable (LANTUS) 10 Unit(s) SubCutaneous every morning  insulin lispro (HumaLOG) corrective regimen sliding scale   SubCutaneous Before meals and at bedtime  insulin lispro Injectable (HumaLOG) 3 Unit(s) SubCutaneous three times a day before meals  pantoprazole    Tablet 40 milliGRAM(s) Oral before breakfast  piperacillin/tazobactam IVPB. 3.375 Gram(s) IV Intermittent every 6 hours  sodium chloride 0.9%. 1000 milliLiter(s) (80 mL/Hr) IV Continuous <Continuous>    MEDICATIONS  (PRN):  acetaminophen   Tablet 650 milliGRAM(s) Oral every 6 hours PRN For Temp greater than 38 C (100.4 F)  acetaminophen   Tablet. 650 milliGRAM(s) Oral every 6 hours PRN Moderate Pain (4 - 6)  dextrose Gel 1 Dose(s) Oral once PRN Blood Glucose LESS THAN 70 milliGRAM(s)/deciliter  glucagon  Injectable 1 milliGRAM(s) IntraMuscular once PRN Glucose LESS THAN 70 milligrams/deciliter      Allergies    No Known Allergies    Intolerances        Vital Signs Last 24 Hrs  T(C): 37 (08 Oct 2017 08:29), Max: 37.6 (07 Oct 2017 21:12)  T(F): 98.6 (08 Oct 2017 08:29), Max: 99.6 (07 Oct 2017 21:12)  HR: 63 (08 Oct 2017 08:29) (63 - 90)  BP: 152/60 (08 Oct 2017 08:29) (143/67 - 169/74)  BP(mean): --  RR: 16 (08 Oct 2017 08:29) (16 - 18)  SpO2: 96% (08 Oct 2017 08:29) (93% - 96%)    10-07 @ 07:01  -  10-08 @ 07:00  --------------------------------------------------------  IN: 1160 mL / OUT: 0 mL / NET: 1160 mL        PHYSICAL EXAM:    General:  in no acute distress  HEENT: MMM, conjunctiva and sclera clear  Lungs: clear  Heart: regular  Gastrointestinal: Soft non-tender non-distended; Normal bowel sounds; No hepatosplenomegaly  Skin: Warm and dry. No obvious rash    LABS:      CBC Full  -  ( 08 Oct 2017 07:59 )  WBC Count : 7.5 K/uL  Hemoglobin : 9.4 g/dL  Hematocrit : 29.0 %  Platelet Count - Automated : 265 K/uL  Mean Cell Volume : 80.8 fL  Mean Cell Hemoglobin : 26.2 pg  Mean Cell Hemoglobin Concentration : 32.4 g/dL  Auto Neutrophil # : x  Auto Lymphocyte # : x  Auto Monocyte # : x  Auto Eosinophil # : x  Auto Basophil # : x  Auto Neutrophil % : 71.3 %  Auto Lymphocyte % : 17.2 %  Auto Monocyte % : 10.5 %  Auto Eosinophil % : 0.9 %  Auto Basophil % : 0.1 %    10-08    140  |  101  |  9   ----------------------------<  136<H>  4.0   |  26  |  0.80    Ca    8.9      08 Oct 2017 07:59  Phos  2.8     10-07  Mg     1.9     10-08                        RADIOLOGY & ADDITIONAL STUDIES (The following images were personally reviewed):

## 2017-10-08 NOTE — PROGRESS NOTE ADULT - PROBLEM SELECTOR PLAN 6
-hold bp meds in setting of sepsis - Bp meds held in setting of infection  - F/u vitals, resume home meds as required

## 2017-10-08 NOTE — PROGRESS NOTE ADULT - PROBLEM SELECTOR PLAN 7
normocytic anemia.  no active bleeding.  unknown baseline.  dose have CKD but at stage 2.   -will check fe panel, b12, folate, retic percent - Normocytic anemia, unknown baseline   - Likely anemia of chronic disease, ferritin elevated possibly as acute phase reactant

## 2017-10-08 NOTE — PROGRESS NOTE ADULT - PROBLEM SELECTOR PLAN 3
calculated serum osm 287- normotonic, which is odd considering hyperglycemia.  history of polydipsia and hyperlipidemia may be why her serum osm is isotonic.  getting another 1 L NS, as she appears dry on exam   -will trend BMP and not give further IVF after bolus - Serum osm 287- normotonic  - trend BMP, improved to 140 this morning with resolution of hyperglycemia

## 2017-10-09 ENCOUNTER — TRANSCRIPTION ENCOUNTER (OUTPATIENT)
Age: 72
End: 2017-10-09

## 2017-10-09 VITALS
TEMPERATURE: 98 F | DIASTOLIC BLOOD PRESSURE: 64 MMHG | HEART RATE: 78 BPM | RESPIRATION RATE: 17 BRPM | SYSTOLIC BLOOD PRESSURE: 169 MMHG | OXYGEN SATURATION: 99 %

## 2017-10-09 DIAGNOSIS — E11.65 TYPE 2 DIABETES MELLITUS WITH HYPERGLYCEMIA: ICD-10-CM

## 2017-10-09 LAB
ANION GAP SERPL CALC-SCNC: 11 MMOL/L — SIGNIFICANT CHANGE UP (ref 5–17)
BUN SERPL-MCNC: 6 MG/DL — LOW (ref 7–23)
CALCIUM SERPL-MCNC: 8.3 MG/DL — LOW (ref 8.4–10.5)
CHLORIDE SERPL-SCNC: 103 MMOL/L — SIGNIFICANT CHANGE UP (ref 96–108)
CO2 SERPL-SCNC: 25 MMOL/L — SIGNIFICANT CHANGE UP (ref 22–31)
CREAT SERPL-MCNC: 0.71 MG/DL — SIGNIFICANT CHANGE UP (ref 0.5–1.3)
GLUCOSE SERPL-MCNC: 138 MG/DL — HIGH (ref 70–99)
HCT VFR BLD CALC: 28.1 % — LOW (ref 34.5–45)
HGB BLD-MCNC: 9.1 G/DL — LOW (ref 11.5–15.5)
MAGNESIUM SERPL-MCNC: 1.6 MG/DL — SIGNIFICANT CHANGE UP (ref 1.6–2.6)
MCHC RBC-ENTMCNC: 26.8 PG — LOW (ref 27–34)
MCHC RBC-ENTMCNC: 32.4 G/DL — SIGNIFICANT CHANGE UP (ref 32–36)
MCV RBC AUTO: 82.9 FL — SIGNIFICANT CHANGE UP (ref 80–100)
PHOSPHATE SERPL-MCNC: 3.4 MG/DL — SIGNIFICANT CHANGE UP (ref 2.5–4.5)
PLATELET # BLD AUTO: 293 K/UL — SIGNIFICANT CHANGE UP (ref 150–400)
POTASSIUM SERPL-MCNC: 5.3 MMOL/L — SIGNIFICANT CHANGE UP (ref 3.5–5.3)
POTASSIUM SERPL-SCNC: 5.3 MMOL/L — SIGNIFICANT CHANGE UP (ref 3.5–5.3)
RBC # BLD: 3.39 M/UL — LOW (ref 3.8–5.2)
RBC # FLD: 13.7 % — SIGNIFICANT CHANGE UP (ref 10.3–16.9)
SODIUM SERPL-SCNC: 139 MMOL/L — SIGNIFICANT CHANGE UP (ref 135–145)
WBC # BLD: 6.1 K/UL — SIGNIFICANT CHANGE UP (ref 3.8–10.5)
WBC # FLD AUTO: 6.1 K/UL — SIGNIFICANT CHANGE UP (ref 3.8–10.5)

## 2017-10-09 RX ORDER — IBUPROFEN 200 MG
400 TABLET ORAL ONCE
Qty: 0 | Refills: 0 | Status: COMPLETED | OUTPATIENT
Start: 2017-10-09 | End: 2017-10-09

## 2017-10-09 RX ORDER — IBUPROFEN 200 MG
0 TABLET ORAL
Qty: 0 | Refills: 0 | COMMUNITY

## 2017-10-09 RX ORDER — CIPROFLOXACIN LACTATE 400MG/40ML
1 VIAL (ML) INTRAVENOUS
Qty: 20 | Refills: 0 | OUTPATIENT
Start: 2017-10-09 | End: 2017-10-19

## 2017-10-09 RX ADMIN — PIPERACILLIN AND TAZOBACTAM 200 GRAM(S): 4; .5 INJECTION, POWDER, LYOPHILIZED, FOR SOLUTION INTRAVENOUS at 04:52

## 2017-10-09 RX ADMIN — Medication 2: at 08:02

## 2017-10-09 RX ADMIN — INSULIN GLARGINE 10 UNIT(S): 100 INJECTION, SOLUTION SUBCUTANEOUS at 08:02

## 2017-10-09 RX ADMIN — Medication 3 UNIT(S): at 12:49

## 2017-10-09 RX ADMIN — PIPERACILLIN AND TAZOBACTAM 200 GRAM(S): 4; .5 INJECTION, POWDER, LYOPHILIZED, FOR SOLUTION INTRAVENOUS at 10:04

## 2017-10-09 RX ADMIN — PANTOPRAZOLE SODIUM 40 MILLIGRAM(S): 20 TABLET, DELAYED RELEASE ORAL at 08:02

## 2017-10-09 RX ADMIN — SODIUM CHLORIDE 80 MILLILITER(S): 9 INJECTION INTRAMUSCULAR; INTRAVENOUS; SUBCUTANEOUS at 04:52

## 2017-10-09 RX ADMIN — Medication 400 MILLIGRAM(S): at 12:46

## 2017-10-09 RX ADMIN — Medication 400 MILLIGRAM(S): at 13:45

## 2017-10-09 RX ADMIN — Medication 3 UNIT(S): at 08:02

## 2017-10-09 NOTE — DISCHARGE NOTE ADULT - PATIENT PORTAL LINK FT
“You can access the [facility] Patient Portal, offered by [facility], by registering with the following website: [email or web address]”

## 2017-10-09 NOTE — PROGRESS NOTE ADULT - SUBJECTIVE AND OBJECTIVE BOX
PROGRESS NOTE    Overnight Events: Headaches overnight.     Interval History:  Patient complaining of headache this AM. Patient reports dysuria resolved. Denies fevers, chills, dizziness, nausea, vomiting, abdominal pain.  ROS: Denies fevers, chills, nausea, vomiting, abdominal pain, chest pain, shortness of breath.     OBJECTIVE  PHYSICAL EXAM:  T(C): 36.9 (10-08-17 @ 22:33), Max: 37.3 (10-08-17 @ 20:35)  HR: 78 (10-08-17 @ 22:33) (69 - 78)  BP: 174/66 (10-08-17 @ 22:33) (162/74 - 174/66)  RR: 17 (10-08-17 @ 22:33) (15 - 18)  SpO2: 98% (10-08-17 @ 22:33) (96% - 98%)  Wt(kg): --    I&O's Summary    08 Oct 2017 07:01  -  09 Oct 2017 07:00  --------------------------------------------------------  IN: 2320 mL / OUT: 0 mL / NET: 2320 mL        Appearance: NAD no respiratory distress/accessory muscle use. Speaking in full sentences.   HEENT:  PERRL. No pallor noted .Conjunctiva clear b/l. Moist oral mucosa.  Cardiovascular: Rate fast though regular rhythm. S1, S2 appreciated with no murmurs.  Respiratory: Lungs CTAB.   Gastrointestinal:  Soft, nontender. Non-distended. Non-rigid.	  Extremities: No edema b/l. No erythema b/l. LE WWP b/l.  Vascular: DP 2+ b/l.  Neurologic:  Alert and awake oriented x3. Moving all extremities. Following commands. Making eye contact.  	  LABS:                        9.1    6.1   )-----------( 293      ( 09 Oct 2017 05:40 )             28.1     10-09    139  |  103  |  6<L>  ----------------------------<  138<H>  5.3   |  25  |  0.71    Ca    8.3<L>      09 Oct 2017 05:38  Phos  3.4     10-09  Mg     1.6     10-09            RADIOLOGY & ADDITIONAL TESTS:  Reviewed by myself and with medical team.    MEDICATIONS  (STANDING):  atorvastatin 10 milliGRAM(s) Oral at bedtime  dextrose 5%. 1000 milliLiter(s) (50 mL/Hr) IV Continuous <Continuous>  dextrose 50% Injectable 12.5 Gram(s) IV Push once  dextrose 50% Injectable 25 Gram(s) IV Push once  dextrose 50% Injectable 25 Gram(s) IV Push once  insulin glargine Injectable (LANTUS) 10 Unit(s) SubCutaneous every morning  insulin lispro (HumaLOG) corrective regimen sliding scale   SubCutaneous Before meals and at bedtime  insulin lispro Injectable (HumaLOG) 3 Unit(s) SubCutaneous three times a day before meals  pantoprazole    Tablet 40 milliGRAM(s) Oral before breakfast  piperacillin/tazobactam IVPB. 3.375 Gram(s) IV Intermittent every 6 hours  sodium chloride 0.9%. 1000 milliLiter(s) (80 mL/Hr) IV Continuous <Continuous>    MEDICATIONS  (PRN):  acetaminophen   Tablet 650 milliGRAM(s) Oral every 6 hours PRN For Temp greater than 38 C (100.4 F)  acetaminophen   Tablet. 650 milliGRAM(s) Oral every 6 hours PRN Moderate Pain (4 - 6)  dextrose Gel 1 Dose(s) Oral once PRN Blood Glucose LESS THAN 70 milliGRAM(s)/deciliter  glucagon  Injectable 1 milliGRAM(s) IntraMuscular once PRN Glucose LESS THAN 70 milligrams/deciliter PROGRESS NOTE    CC: Weakness    Overnight Events: Headaches overnight.     Interval History:  Patient complaining of headache this AM. Patient reports dysuria resolved. Denies fevers, chills, dizziness, nausea, vomiting, abdominal pain.  ROS: Denies fevers, chills, nausea, vomiting, abdominal pain, chest pain, shortness of breath.     OBJECTIVE  PHYSICAL EXAM:  T(C): 36.9 (10-08-17 @ 22:33), Max: 37.3 (10-08-17 @ 20:35)  HR: 78 (10-08-17 @ 22:33) (69 - 78)  BP: 174/66 (10-08-17 @ 22:33) (162/74 - 174/66)  RR: 17 (10-08-17 @ 22:33) (15 - 18)  SpO2: 98% (10-08-17 @ 22:33) (96% - 98%)  Wt(kg): --    I&O's Summary    08 Oct 2017 07:01  -  09 Oct 2017 07:00  --------------------------------------------------------  IN: 2320 mL / OUT: 0 mL / NET: 2320 mL        Appearance: NAD no respiratory distress/accessory muscle use. Speaking in full sentences.   HEENT:  PERRL. No pallor noted .Conjunctiva clear b/l. Moist oral mucosa.  Cardiovascular: Rate fast though regular rhythm. S1, S2 appreciated with no murmurs.  Respiratory: Lungs CTAB.   Gastrointestinal:  Soft, nontender. Non-distended. Non-rigid.	  Extremities: No edema b/l. No erythema b/l. LE WWP b/l.  Vascular: DP 2+ b/l.  Neurologic:  Alert and awake oriented x3. Moving all extremities. Following commands. Making eye contact.  	  LABS:                        9.1    6.1   )-----------( 293      ( 09 Oct 2017 05:40 )             28.1     10-09    139  |  103  |  6<L>  ----------------------------<  138<H>  5.3   |  25  |  0.71    Ca    8.3<L>      09 Oct 2017 05:38  Phos  3.4     10-09  Mg     1.6     10-09            RADIOLOGY & ADDITIONAL TESTS:  Reviewed by myself and with medical team.    MEDICATIONS  (STANDING):  atorvastatin 10 milliGRAM(s) Oral at bedtime  dextrose 5%. 1000 milliLiter(s) (50 mL/Hr) IV Continuous <Continuous>  dextrose 50% Injectable 12.5 Gram(s) IV Push once  dextrose 50% Injectable 25 Gram(s) IV Push once  dextrose 50% Injectable 25 Gram(s) IV Push once  insulin glargine Injectable (LANTUS) 10 Unit(s) SubCutaneous every morning  insulin lispro (HumaLOG) corrective regimen sliding scale   SubCutaneous Before meals and at bedtime  insulin lispro Injectable (HumaLOG) 3 Unit(s) SubCutaneous three times a day before meals  pantoprazole    Tablet 40 milliGRAM(s) Oral before breakfast  piperacillin/tazobactam IVPB. 3.375 Gram(s) IV Intermittent every 6 hours  sodium chloride 0.9%. 1000 milliLiter(s) (80 mL/Hr) IV Continuous <Continuous>    MEDICATIONS  (PRN):  acetaminophen   Tablet 650 milliGRAM(s) Oral every 6 hours PRN For Temp greater than 38 C (100.4 F)  acetaminophen   Tablet. 650 milliGRAM(s) Oral every 6 hours PRN Moderate Pain (4 - 6)  dextrose Gel 1 Dose(s) Oral once PRN Blood Glucose LESS THAN 70 milliGRAM(s)/deciliter  glucagon  Injectable 1 milliGRAM(s) IntraMuscular once PRN Glucose LESS THAN 70 milligrams/deciliter PROGRESS NOTE    CC: Weakness, dysuria    Overnight Events: Headaches overnight. RODOLFO    Interval History:  Patient complaining of headache this AM. Patient reports dysuria resolved. Denies fevers, chills, dizziness, nausea, vomiting, abdominal pain.  ROS: Denies fevers, chills, nausea, vomiting, abdominal pain, chest pain, shortness of breath.     OBJECTIVE  PHYSICAL EXAM:  T(C): 36.9 (10-08-17 @ 22:33), Max: 37.3 (10-08-17 @ 20:35)  HR: 78 (10-08-17 @ 22:33) (69 - 78)  BP: 174/66 (10-08-17 @ 22:33) (162/74 - 174/66)  RR: 17 (10-08-17 @ 22:33) (15 - 18)  SpO2: 98% (10-08-17 @ 22:33) (96% - 98%)  Wt(kg): --    I&O's Summary    08 Oct 2017 07:01  -  09 Oct 2017 07:00  --------------------------------------------------------  IN: 2320 mL / OUT: 0 mL / NET: 2320 mL        Appearance: NAD no respiratory distress/accessory muscle use. Speaking in full sentences.   HEENT:  PERRL. No pallor noted .Conjunctiva clear b/l. Moist oral mucosa.  Cardiovascular: Rate fast though regular rhythm. S1, S2 appreciated with no murmurs.  Respiratory: Lungs CTAB.   Gastrointestinal:  Soft, nontender. Non-distended. Non-rigid.	  Extremities: No edema b/l. No erythema b/l. LE WWP b/l.  Vascular: DP 2+ b/l.  Neurologic:  Alert and awake oriented x3. Moving all extremities. Following commands. Making eye contact.  	  LABS:                        9.1    6.1   )-----------( 293      ( 09 Oct 2017 05:40 )             28.1     10-09    139  |  103  |  6<L>  ----------------------------<  138<H>  5.3   |  25  |  0.71    Ca    8.3<L>      09 Oct 2017 05:38  Phos  3.4     10-09  Mg     1.6     10-09            RADIOLOGY & ADDITIONAL TESTS:  Reviewed by myself and with medical team.    MEDICATIONS  (STANDING):  atorvastatin 10 milliGRAM(s) Oral at bedtime  dextrose 5%. 1000 milliLiter(s) (50 mL/Hr) IV Continuous <Continuous>  dextrose 50% Injectable 12.5 Gram(s) IV Push once  dextrose 50% Injectable 25 Gram(s) IV Push once  dextrose 50% Injectable 25 Gram(s) IV Push once  insulin glargine Injectable (LANTUS) 10 Unit(s) SubCutaneous every morning  insulin lispro (HumaLOG) corrective regimen sliding scale   SubCutaneous Before meals and at bedtime  insulin lispro Injectable (HumaLOG) 3 Unit(s) SubCutaneous three times a day before meals  pantoprazole    Tablet 40 milliGRAM(s) Oral before breakfast  piperacillin/tazobactam IVPB. 3.375 Gram(s) IV Intermittent every 6 hours  sodium chloride 0.9%. 1000 milliLiter(s) (80 mL/Hr) IV Continuous <Continuous>    MEDICATIONS  (PRN):  acetaminophen   Tablet 650 milliGRAM(s) Oral every 6 hours PRN For Temp greater than 38 C (100.4 F)  acetaminophen   Tablet. 650 milliGRAM(s) Oral every 6 hours PRN Moderate Pain (4 - 6)  dextrose Gel 1 Dose(s) Oral once PRN Blood Glucose LESS THAN 70 milliGRAM(s)/deciliter  glucagon  Injectable 1 milliGRAM(s) IntraMuscular once PRN Glucose LESS THAN 70 milligrams/deciliter

## 2017-10-09 NOTE — PROGRESS NOTE ADULT - PROBLEM SELECTOR PLAN 1
Likely 2/2 Klebsiella bacteremia and pyelonephritis. Met sepsis criteria on admission, fever/tachycardia Likely 2/2 Klebsiella bacteremia and pyelonephritis. Met sepsis criteria on admission, fever/tachycardia. Resolved SIRS.  - Based on sensitivities, patient converted from zosyn to cipro PO for discharge planning.

## 2017-10-09 NOTE — PROGRESS NOTE ADULT - PROBLEM SELECTOR PLAN 7
Dash, diabetic diet.  NS 80cc/hr  Replete lytes prn K>4, Mg>2, Phos >2.5. Dash, diabetic diet.  PO intake for fluids.  Replete lytes prn K>4, Mg>2, Phos >2.5.

## 2017-10-09 NOTE — DISCHARGE NOTE ADULT - SECONDARY DIAGNOSIS.
Bacteremia Uncontrolled diabetes mellitus type 2 without complications, unspecified long term insulin use status Weakness Hyponatremia Essential hypertension

## 2017-10-09 NOTE — PROGRESS NOTE ADULT - PROBLEM SELECTOR PLAN 4
Pseudohyponatremia due to hyperglycemia.   - Resolving, monitor Pseudohyponatremia due to hyperglycemia.   - Resolved.

## 2017-10-09 NOTE — DISCHARGE NOTE ADULT - ADDITIONAL INSTRUCTIONS
Follow up with your primary doctor upon discharge from the hospital. Follow up with your primary doctor, Dr. Diaz upon discharge from the hospital. Follow up with Dr. Diaz upon discharge from the hospital. An appointment has been made for Monday 10/16 3PM. Please call [phone] for any changes or questions.  Please take your antbiotic Ciprofloxacin 500mg tablet twice a day for 10 additional day (last day 10/19/17).

## 2017-10-09 NOTE — PROGRESS NOTE ADULT - SUBJECTIVE AND OBJECTIVE BOX
patient without complaints    ANTIBIOTICS    MEDICATIONS  (STANDING):  atorvastatin 10 milliGRAM(s) Oral at bedtime  dextrose 5%. 1000 milliLiter(s) (50 mL/Hr) IV Continuous <Continuous>  dextrose 50% Injectable 12.5 Gram(s) IV Push once  dextrose 50% Injectable 25 Gram(s) IV Push once  dextrose 50% Injectable 25 Gram(s) IV Push once  ibuprofen  Tablet 400 milliGRAM(s) Oral once  insulin glargine Injectable (LANTUS) 10 Unit(s) SubCutaneous every morning  insulin lispro (HumaLOG) corrective regimen sliding scale   SubCutaneous Before meals and at bedtime  insulin lispro Injectable (HumaLOG) 3 Unit(s) SubCutaneous three times a day before meals  pantoprazole    Tablet 40 milliGRAM(s) Oral before breakfast  piperacillin/tazobactam IVPB. 3.375 Gram(s) IV Intermittent every 6 hours  sodium chloride 0.9%. 1000 milliLiter(s) (80 mL/Hr) IV Continuous <Continuous>    MEDICATIONS  (PRN):  acetaminophen   Tablet 650 milliGRAM(s) Oral every 6 hours PRN For Temp greater than 38 C (100.4 F)  acetaminophen   Tablet. 650 milliGRAM(s) Oral every 6 hours PRN Moderate Pain (4 - 6)  dextrose Gel 1 Dose(s) Oral once PRN Blood Glucose LESS THAN 70 milliGRAM(s)/deciliter  glucagon  Injectable 1 milliGRAM(s) IntraMuscular once PRN Glucose LESS THAN 70 milligrams/deciliter      Allergies    No Known Allergies    Intolerances        REVIEW OF SYSTEMS:    Constitutional: No fever, weight loss or fatigue  Eyes: No eye pain, visual disturbances, or discharge  ENMT:  No difficulty hearing, tinnitus, vertigo; No sinus or throat pain  Neck: No pain or stiffness  Respiratory: No cough, wheezing, chills or hemoptysis  Cardiovascular: No chest pain, palpitations, shortness of breath, dizziness or leg swelling  Gastrointestinal: No abdominal or epigastric pain. No nausea, vomiting or hematemesis; No diarrhea or constipation. No melena or hematochezia.  Genitourinary: No dysuria, frequency, hematuria or incontinence  Rectal: No pain, hemorrhoids or incontinence  Vital Signs Last 24 Hrs  T(C): 36.6 (09 Oct 2017 09:26), Max: 37.3 (08 Oct 2017 20:35)  T(F): 97.9 (09 Oct 2017 09:26), Max: 99.1 (08 Oct 2017 20:35)  HR: 82 (09 Oct 2017 09:26) (69 - 82)  BP: 115/66 (09 Oct 2017 09:26) (115/66 - 174/66)  BP(mean): --  RR: 18 (09 Oct 2017 09:26) (15 - 18)  SpO2: 98% (09 Oct 2017 09:26) (96% - 98%)    PHYSICAL EXAM:    General: Well developed; well nourished; in no acute distress  Eyes: PERRL, EOM intact; conjunctiva and sclera clear  Head: Normocephalic; atraumatic  ENMT: No nasal discharge; airway clear  Neck: Supple; non tender; no masses  Respiratory: No wheezes, rales or rhonchi  Cardiovascular: Regular rate and rhythm. S1 and S2 Normal; No murmurs, gallops or rubs  Gastrointestinal: Soft non-tender non-distended; Normal bowel sounds; No hepatosplenomegaly  Genitourinary: No costovertebral angle tenderness  Extremities: Normal range of motion, No clubbing, cyanosis or edema  Vascular: Peripheral pulses palpable 2+ bilaterally  Neurological: Alert and oriented x3  Skin: Warm and dry. No acute rash  Lymph Nodes: No acute cervical adenopathy  Musculoskeletal: Normal gait, tone, without deformities    LABS:                        9.1    6.1   )-----------( 293      ( 09 Oct 2017 05:40 )             28.1     10-09    139  |  103  |  6<L>  ----------------------------<  138<H>  5.3   |  25  |  0.71    Ca    8.3<L>      09 Oct 2017 05:38  Phos  3.4     10-09  Mg     1.6     10-09              MICROBIOLOGY:  Culture Results:   No growth at 2 days. (10-06 @ 17:00)  Culture Results:   >100,000 CFU/ml Klebsiella pneumoniae (10-06 @ 08:33)  Culture Results:   No growth at 3 days. (10-05 @ 20:52)  Culture Results:   Growth in anaerobic bottle: Klebsiella pneumoniae (10-05 @ 20:52)      RADIOLOGY & ADDITIONAL STUDIES:

## 2017-10-09 NOTE — PROGRESS NOTE ADULT - PROVIDER SPECIALTY LIST ADULT
Gastroenterology
Infectious Disease
Internal Medicine
Nephrology
Nephrology
Internal Medicine
Nephrology
Internal Medicine

## 2017-10-09 NOTE — PROGRESS NOTE ADULT - PROBLEM SELECTOR PLAN 1
Cipro 5oo mg PO BID for 10 days for Klebsiella  infection  Patient should followup with Dr. Garrison

## 2017-10-09 NOTE — PROGRESS NOTE ADULT - SUBJECTIVE AND OBJECTIVE BOX
INTERVAL HPI/OVERNIGHT EVENTS:  72 year old BF, DM, HTN, Klebsiella pyelonephritis on piperacicilin/tazobactam. Repeat urine culture, klebsiella sensitive to cipro.   MEDICATIONS  (STANDING):  atorvastatin 10 milliGRAM(s) Oral at bedtime  dextrose 5%. 1000 milliLiter(s) (50 mL/Hr) IV Continuous <Continuous>  dextrose 50% Injectable 12.5 Gram(s) IV Push once  dextrose 50% Injectable 25 Gram(s) IV Push once  dextrose 50% Injectable 25 Gram(s) IV Push once  ibuprofen  Tablet 400 milliGRAM(s) Oral once  insulin glargine Injectable (LANTUS) 10 Unit(s) SubCutaneous every morning  insulin lispro (HumaLOG) corrective regimen sliding scale   SubCutaneous Before meals and at bedtime  insulin lispro Injectable (HumaLOG) 3 Unit(s) SubCutaneous three times a day before meals  pantoprazole    Tablet 40 milliGRAM(s) Oral before breakfast  piperacillin/tazobactam IVPB. 3.375 Gram(s) IV Intermittent every 6 hours  sodium chloride 0.9%. 1000 milliLiter(s) (80 mL/Hr) IV Continuous <Continuous>    MEDICATIONS  (PRN):  acetaminophen   Tablet 650 milliGRAM(s) Oral every 6 hours PRN For Temp greater than 38 C (100.4 F)  acetaminophen   Tablet. 650 milliGRAM(s) Oral every 6 hours PRN Moderate Pain (4 - 6)  dextrose Gel 1 Dose(s) Oral once PRN Blood Glucose LESS THAN 70 milliGRAM(s)/deciliter  glucagon  Injectable 1 milliGRAM(s) IntraMuscular once PRN Glucose LESS THAN 70 milligrams/deciliter      Allergies    No Known Allergies    Intolerances        REVIEW OF SYSTEMS  General:	  Respiratory and Thorax:  Cardiovascular:	  Gastrointestinal:	  Genitourinary:	  Musculoskeletal:	  Neurological:	  Psychiatric:	  Hematology/Lymphatics:	  Endocrine:	  Allergic/Immunologi    Vital Signs Last 24 Hrs  T(C): 36.6 (09 Oct 2017 09:26), Max: 37.3 (08 Oct 2017 20:35)  T(F): 97.9 (09 Oct 2017 09:26), Max: 99.1 (08 Oct 2017 20:35)  HR: 82 (09 Oct 2017 09:26) (69 - 82)  BP: 115/66 (09 Oct 2017 09:26) (115/66 - 174/66)  BP(mean): --  RR: 18 (09 Oct 2017 09:26) (15 - 18)  SpO2: 98% (09 Oct 2017 09:26) (96% - 98%)  I&O's Summary    08 Oct 2017 07:01  -  09 Oct 2017 07:00  --------------------------------------------------------  IN: 2320 mL / OUT: 0 mL / NET: 2320 mL        10-08 @ 07:01  -  10-09 @ 07:00  --------------------------------------------------------  IN:    IV PiggyBack: 400 mL    sodium chloride 0.9%.: 1920 mL  Total IN: 2320 mL    OUT:  Total OUT: 0 mL    Total NET: 2320 mL        physical exam    pulmonary: clear to A  cardio : RR,   ABD: soft, BS   Ext: no edema    LABS:                        9.1    6.1   )-----------( 293      ( 09 Oct 2017 05:40 )             28.1     10-09    139  |  103  |  6<L>  ----------------------------<  138<H>  5.3   |  25  |  0.71    Ca    8.3<L>      09 Oct 2017 05:38  Phos  3.4     10-09  Mg     1.6     10-09          RADIOLOGY & ADDITIONAL TESTS:

## 2017-10-09 NOTE — PROGRESS NOTE ADULT - PROBLEM SELECTOR PLAN 2
Uncontrolled diabetes HgbA1C 10.9%. Came in with hyperglycemia to glucose 472 on BMP, possibly also 2/2 sepsis. On insulin, glipizide at home.  - c/w Lantus 10U qHs, 3U  premeal, moderate sliding scale Uncontrolled diabetes HgbA1C 10.9%. Came in with hyperglycemia to glucose 472 on BMP, possibly also 2/2 sepsis. On insulin, glipizide at home.  - c/w Lantus 10U qHs, 3U  premeal, moderate sliding scale. Will discharge on home regimen and further adjust DM regimen as outpatient.

## 2017-10-09 NOTE — PROGRESS NOTE ADULT - PROBLEM SELECTOR PLAN 5
Holding BP meds in setting of sepsis. Pt normotensive. Holding BP meds in setting of sepsis. Pt normotensive. Will restart home regimen upon discharge.

## 2017-10-09 NOTE — DISCHARGE NOTE ADULT - PLAN OF CARE
Discharge home. Improve infection and symptoms. You were found to have a urinary tract infection as well as bacteria in the blood that caused you to meet sepsis criteria. You were treated with IV antibiotics. Based on sensitivities of cultures, we have switched you to oral antibiotics for a total of 14 day course. The antibiotics have been sent to your pharmacy and please take as prescribed (the last day of your antibiotic course is ) You were found to have a urinary tract infection as well as bacteria in the blood that caused you to meet sepsis criteria. You were treated with IV antibiotics. Based on sensitivities of cultures, we have switched you to oral antibiotics for a total of 14 day course. The antibiotics, Ciprofloxacin 500mg oral tablet twice a day for 10 additional days have been sent to your pharmacy and please take as prescribed (the last day of your antibiotic course is 10/19). Improve infection The urinary tract infection in your kidneys spread to your blood, based on urine and blood cultures. You have been treated with IV antibiotics. Based on sensitivities, your antibiotic has been narrowed and you will be sent home on ciprofloxacin for 10 additional days of antibiotic therapy. The prescription for Ciprofloxacin has been sent to your pharmacy and please take as prescribed (the last day of your antibiotic course is 10/19/17). Please continue with your home regimen and follow up with your primary doctor, Dr. Diaz upon discharge for further management of your diabetes. You were found to have low sodium levels likely secondary to dehydration. This has since resolved. Please continue with your blood pressure medication regimen and follow up with Dr. Diaz upon discharge. You were found to have a urinary tract infection as well as bacteria in the blood that caused you to meet sepsis criteria. You were treated with IV antibiotics. Based on sensitivities of cultures, we have switched you to oral antibiotics for a total of 14 day course. The antibiotics, Ciprofloxacin 500mg oral tablet twice a day for 10 additional days have been sent to your pharmacy and please take as prescribed (the last day of your antibiotic course is 10/19). An appointment with Dr. Diaz upon discharge has been made for you on Monday 10/16/17 at 3PM. Please call [phone] for any questions or changes.

## 2017-10-09 NOTE — DISCHARGE NOTE ADULT - CARE PROVIDER_API CALL
Wisam Diaz), Medicine  [address] [facility] [address]  [place] [address]  Phone: [phone]  Fax: [phone]

## 2017-10-09 NOTE — PROGRESS NOTE ADULT - PROBLEM SELECTOR PROBLEM 3
Uncontrolled diabetes mellitus type 2 without complications, unspecified long term insulin use status

## 2017-10-09 NOTE — DISCHARGE NOTE ADULT - CARE PROVIDERS DIRECT ADDRESSES
[email or web address]

## 2017-10-09 NOTE — PROGRESS NOTE ADULT - PROBLEM SELECTOR PLAN 8
VTE: Improve score 1, no need for pharmacologic PPx, scds, OOB.    Dispo: Continue care on general medicine floor/medicine service

## 2017-10-09 NOTE — PROGRESS NOTE ADULT - SUBJECTIVE AND OBJECTIVE BOX
Pt seen and examined  No complaints    REVIEW OF SYSTEMS:  Constitutional: No fever, weight loss or fatigue  Cardiovascular: No chest pain, palpitations, dizziness or leg swelling  Gastrointestinal: No abdominal or epigastric pain. No nausea, vomiting or hematemesis; No diarrhea or constipation. No melena or hematochezia.  Skin: No itching, burning, rashes or lesions       MEDICATIONS:  MEDICATIONS  (STANDING):  atorvastatin 10 milliGRAM(s) Oral at bedtime  dextrose 5%. 1000 milliLiter(s) (50 mL/Hr) IV Continuous <Continuous>  dextrose 50% Injectable 12.5 Gram(s) IV Push once  dextrose 50% Injectable 25 Gram(s) IV Push once  dextrose 50% Injectable 25 Gram(s) IV Push once  insulin glargine Injectable (LANTUS) 10 Unit(s) SubCutaneous every morning  insulin lispro (HumaLOG) corrective regimen sliding scale   SubCutaneous Before meals and at bedtime  insulin lispro Injectable (HumaLOG) 3 Unit(s) SubCutaneous three times a day before meals  pantoprazole    Tablet 40 milliGRAM(s) Oral before breakfast  piperacillin/tazobactam IVPB. 3.375 Gram(s) IV Intermittent every 6 hours  sodium chloride 0.9%. 1000 milliLiter(s) (80 mL/Hr) IV Continuous <Continuous>    MEDICATIONS  (PRN):  acetaminophen   Tablet 650 milliGRAM(s) Oral every 6 hours PRN For Temp greater than 38 C (100.4 F)  acetaminophen   Tablet. 650 milliGRAM(s) Oral every 6 hours PRN Moderate Pain (4 - 6)  dextrose Gel 1 Dose(s) Oral once PRN Blood Glucose LESS THAN 70 milliGRAM(s)/deciliter  glucagon  Injectable 1 milliGRAM(s) IntraMuscular once PRN Glucose LESS THAN 70 milligrams/deciliter      Allergies    No Known Allergies    Intolerances        Vital Signs Last 24 Hrs  T(C): 36.6 (09 Oct 2017 09:26), Max: 37.3 (08 Oct 2017 20:35)  T(F): 97.9 (09 Oct 2017 09:26), Max: 99.1 (08 Oct 2017 20:35)  HR: 82 (09 Oct 2017 09:26) (69 - 82)  BP: 115/66 (09 Oct 2017 09:26) (115/66 - 174/66)  BP(mean): --  RR: 18 (09 Oct 2017 09:26) (15 - 18)  SpO2: 98% (09 Oct 2017 09:26) (96% - 98%)    10-08 @ 07:01  -  10-09 @ 07:00  --------------------------------------------------------  IN: 2320 mL / OUT: 0 mL / NET: 2320 mL        PHYSICAL EXAM:    General: Well developed; well nourished; in no acute distress  HEENT: MMM, conjunctiva and sclera clear  Lungs: clear  Heart: regular  Gastrointestinal: Soft non-tender non-distended; Normal bowel sounds; No hepatosplenomegaly  Skin: Warm and dry. No obvious rash  Ext: no edema  LABS:      CBC Full  -  ( 09 Oct 2017 05:40 )  WBC Count : 6.1 K/uL  Hemoglobin : 9.1 g/dL  Hematocrit : 28.1 %  Platelet Count - Automated : 293 K/uL  Mean Cell Volume : 82.9 fL  Mean Cell Hemoglobin : 26.8 pg  Mean Cell Hemoglobin Concentration : 32.4 g/dL  Auto Neutrophil # : x  Auto Lymphocyte # : x  Auto Monocyte # : x  Auto Eosinophil # : x  Auto Basophil # : x  Auto Neutrophil % : x  Auto Lymphocyte % : x  Auto Monocyte % : x  Auto Eosinophil % : x  Auto Basophil % : x    10-09    139  |  103  |  6<L>  ----------------------------<  138<H>  5.3   |  25  |  0.71    Ca    8.3<L>      09 Oct 2017 05:38  Phos  3.4     10-09  Mg     1.6     10-09                        RADIOLOGY & ADDITIONAL STUDIES (The following images were personally reviewed):

## 2017-10-09 NOTE — DISCHARGE NOTE ADULT - CARE PLAN
Principal Discharge DX:	Sepsis, due to unspecified organism  Goal:	Discharge home. Improve infection and symptoms.  Instructions for follow-up, activity and diet:	You were found to have a urinary tract infection as well as bacteria in the blood that caused you to meet sepsis criteria. You were treated with IV antibiotics. Based on sensitivities of cultures, we have switched you to oral antibiotics for a total of 14 day course. The antibiotics have been sent to your pharmacy and please take as prescribed (the last day of your antibiotic course is )  Secondary Diagnosis:	Bacteremia  Secondary Diagnosis:	Uncontrolled diabetes mellitus type 2 without complications, unspecified long term insulin use status  Secondary Diagnosis:	Weakness  Secondary Diagnosis:	Hyponatremia  Secondary Diagnosis:	Essential hypertension Principal Discharge DX:	Sepsis, due to unspecified organism  Goal:	Discharge home. Improve infection and symptoms.  Instructions for follow-up, activity and diet:	You were found to have a urinary tract infection as well as bacteria in the blood that caused you to meet sepsis criteria. You were treated with IV antibiotics. Based on sensitivities of cultures, we have switched you to oral antibiotics for a total of 14 day course. The antibiotics, Ciprofloxacin 500mg oral tablet twice a day for 10 additional days have been sent to your pharmacy and please take as prescribed (the last day of your antibiotic course is 10/19).  Secondary Diagnosis:	Bacteremia  Goal:	Improve infection  Instructions for follow-up, activity and diet:	The urinary tract infection in your kidneys spread to your blood, based on urine and blood cultures. You have been treated with IV antibiotics. Based on sensitivities, your antibiotic has been narrowed and you will be sent home on ciprofloxacin for 10 additional days of antibiotic therapy. The prescription for Ciprofloxacin has been sent to your pharmacy and please take as prescribed (the last day of your antibiotic course is 10/19/17).  Secondary Diagnosis:	Uncontrolled diabetes mellitus type 2 without complications, unspecified long term insulin use status  Instructions for follow-up, activity and diet:	Please continue with your home regimen and follow up with your primary doctor, Dr. Diaz upon discharge for further management of your diabetes.  Secondary Diagnosis:	Hyponatremia  Instructions for follow-up, activity and diet:	You were found to have low sodium levels likely secondary to dehydration. This has since resolved.  Secondary Diagnosis:	Essential hypertension  Instructions for follow-up, activity and diet:	Please continue with your blood pressure medication regimen and follow up with Dr. Diaz upon discharge. Principal Discharge DX:	Sepsis, due to unspecified organism  Goal:	Discharge home. Improve infection and symptoms.  Instructions for follow-up, activity and diet:	You were found to have a urinary tract infection as well as bacteria in the blood that caused you to meet sepsis criteria. You were treated with IV antibiotics. Based on sensitivities of cultures, we have switched you to oral antibiotics for a total of 14 day course. The antibiotics, Ciprofloxacin 500mg oral tablet twice a day for 10 additional days have been sent to your pharmacy and please take as prescribed (the last day of your antibiotic course is 10/19). An appointment with Dr. Diaz upon discharge has been made for you on Monday 10/16/17 at 3PM. Please call [phone] for any questions or changes.  Secondary Diagnosis:	Bacteremia  Goal:	Improve infection  Instructions for follow-up, activity and diet:	The urinary tract infection in your kidneys spread to your blood, based on urine and blood cultures. You have been treated with IV antibiotics. Based on sensitivities, your antibiotic has been narrowed and you will be sent home on ciprofloxacin for 10 additional days of antibiotic therapy. The prescription for Ciprofloxacin has been sent to your pharmacy and please take as prescribed (the last day of your antibiotic course is 10/19/17).  Secondary Diagnosis:	Uncontrolled diabetes mellitus type 2 without complications, unspecified long term insulin use status  Instructions for follow-up, activity and diet:	Please continue with your home regimen and follow up with your primary doctor, Dr. Diaz upon discharge for further management of your diabetes.  Secondary Diagnosis:	Hyponatremia  Instructions for follow-up, activity and diet:	You were found to have low sodium levels likely secondary to dehydration. This has since resolved.  Secondary Diagnosis:	Essential hypertension  Instructions for follow-up, activity and diet:	Please continue with your blood pressure medication regimen and follow up with Dr. Diaz upon discharge.

## 2017-10-09 NOTE — DISCHARGE NOTE ADULT - MEDICATION SUMMARY - MEDICATIONS TO TAKE
I will START or STAY ON the medications listed below when I get home from the hospital:    enalapril  -- 40 milligram(s) by mouth once a day  -- Indication: For Essential hypertension    NovoLOG 100 units/mL subcutaneous solution  -- unit(s) subcutaneous  -- Indication: For Diabetes    Lantus 100 units/mL subcutaneous solution  -- unit(s) subcutaneous once a day  -- Indication: For Diabetes    glipiZIDE 10 mg oral tablet  -- 1 tab(s) by mouth once a day  -- Indication: For Diabetes    metFORMIN 1000 mg oral tablet  -- 1 tab(s) by mouth 2 times a day  -- Indication: For Diabetes    metFORMIN 1000 mg oral tablet  -- 1 tab(s) by mouth 2 times a day  -- Indication: For Diabetes    lovastatin 40 mg oral tablet  -- 1 tab(s) by mouth once a day  -- Indication: For Essential hypertension    amLODIPine 5 mg oral tablet  -- 1 tab(s) by mouth once a day  -- Indication: For Essential hypertension    hydroCHLOROthiazide 25 mg oral tablet  -- 1 tab(s) by mouth once a day  -- Indication: For Essential hypertension    Betoptic S 0.25% ophthalmic suspension  -- 1 drop(s) to each affected eye 2 times a day  -- Indication: For Glaucoma    omeprazole 20 mg oral delayed release tablet  -- 1 tab(s) by mouth once a day  -- Indication: For GERD    ciprofloxacin 500 mg oral tablet  -- 1 tab(s) by mouth 2 times a day   -- Avoid prolonged or excessive exposure to direct and/or artificial sunlight while taking this medication.  Check with your doctor before becoming pregnant.  Do not take dairy products, antacids, or iron preparations within one hour of this medication.  Finish all this medication unless otherwise directed by prescriber.  Medication should be taken with plenty of water.    -- Indication: For Pyelonephritis

## 2017-10-09 NOTE — PROGRESS NOTE ADULT - PROBLEM SELECTOR PLAN 3
Likely mixed in setting of iron deficiency and anemia of chronic disease (low iron, low TIBC, increased ferritin)  - Hb dropped from 10.6 to 9.2 (10/6); no signs of active bleeding or hemolysis  - monitor for now, transfusion goal Hgb>7, active type and screen Likely mixed in setting of iron deficiency and anemia of chronic disease (low iron, low TIBC, increased ferritin)  - Hgb stable at 9.1.

## 2017-10-09 NOTE — DISCHARGE NOTE ADULT - HOSPITAL COURSE
73 yo F PMH DM2 on insulin w/ complication, HTN presented to [facility] with weakness for over one week.  Patient also with associated dysuria, polyuria, and malodorous urine for 2-3 weeks. In the ED, VS significant for temp 102.1, .  Labs revealed  Na 127, K 6.0, glc 472, UA pos for small LE, many WBCs. Imaging with CT abdomen/pelvis w/ IV cont revealed R pyelo and L small fluid attenuations, cysts vs. subcentimeter abscesses. Patient received 2L NS, 1 g ceft IV x 1, tylenol 975 PO x 1, ca gluconate 1 g IV x 1, 10 U regular insulin.  Admitted to general medicine service for sepsis secondary to UTI/pyelonephritis. Patient initially started on ceftriaxone but Patient found to have bacteremia with Klebsiella species. Therefore patient switched to zosyn and infectious disease consulted. Urine cultures consistent with blood cultures. Urology and nephrology consulted for cysts and abscess but not warranting intervention. Sensitivities returned with narrowing antibiotic regimen to PO ciprofloxacin per ID recommendations. Patient determined stable for discharge with follow up with Dr. Diaz as outpatient.

## 2017-10-09 NOTE — PROGRESS NOTE ADULT - PROBLEM SELECTOR PLAN 1
Klebsiella is sensitive to Cipro but very difficult to get cure. Start Cirpo prior to discharge in hospital and patient needs follow-up urine culture post 2 weeks of therapy.

## 2017-10-10 LAB
CULTURE RESULTS: SIGNIFICANT CHANGE UP
SPECIMEN SOURCE: SIGNIFICANT CHANGE UP

## 2017-10-11 LAB
CULTURE RESULTS: SIGNIFICANT CHANGE UP
SPECIMEN SOURCE: SIGNIFICANT CHANGE UP

## 2017-10-12 LAB
CULTURE RESULTS: SIGNIFICANT CHANGE UP
ORGANISM # SPEC MICROSCOPIC CNT: SIGNIFICANT CHANGE UP
SPECIMEN SOURCE: SIGNIFICANT CHANGE UP

## 2017-10-13 DIAGNOSIS — A41.4 SEPSIS DUE TO ANAEROBES: ICD-10-CM

## 2017-10-13 DIAGNOSIS — I12.9 HYPERTENSIVE CHRONIC KIDNEY DISEASE WITH STAGE 1 THROUGH STAGE 4 CHRONIC KIDNEY DISEASE, OR UNSPECIFIED CHRONIC KIDNEY DISEASE: ICD-10-CM

## 2017-10-13 DIAGNOSIS — Z79.84 LONG TERM (CURRENT) USE OF ORAL HYPOGLYCEMIC DRUGS: ICD-10-CM

## 2017-10-13 DIAGNOSIS — D63.1 ANEMIA IN CHRONIC KIDNEY DISEASE: ICD-10-CM

## 2017-10-13 DIAGNOSIS — E87.5 HYPERKALEMIA: ICD-10-CM

## 2017-10-13 DIAGNOSIS — N10 ACUTE PYELONEPHRITIS: ICD-10-CM

## 2017-10-13 DIAGNOSIS — E11.22 TYPE 2 DIABETES MELLITUS WITH DIABETIC CHRONIC KIDNEY DISEASE: ICD-10-CM

## 2017-10-13 DIAGNOSIS — N18.2 CHRONIC KIDNEY DISEASE, STAGE 2 (MILD): ICD-10-CM

## 2017-10-13 DIAGNOSIS — H40.9 UNSPECIFIED GLAUCOMA: ICD-10-CM

## 2017-10-13 DIAGNOSIS — E11.65 TYPE 2 DIABETES MELLITUS WITH HYPERGLYCEMIA: ICD-10-CM

## 2017-10-13 DIAGNOSIS — E87.1 HYPO-OSMOLALITY AND HYPONATREMIA: ICD-10-CM

## 2017-10-13 DIAGNOSIS — Z79.4 LONG TERM (CURRENT) USE OF INSULIN: ICD-10-CM

## 2017-10-13 DIAGNOSIS — D50.9 IRON DEFICIENCY ANEMIA, UNSPECIFIED: ICD-10-CM

## 2017-12-19 PROCEDURE — 83550 IRON BINDING TEST: CPT

## 2017-12-19 PROCEDURE — 82746 ASSAY OF FOLIC ACID SERUM: CPT

## 2017-12-19 PROCEDURE — 85730 THROMBOPLASTIN TIME PARTIAL: CPT

## 2017-12-19 PROCEDURE — 82010 KETONE BODYS QUAN: CPT

## 2017-12-19 PROCEDURE — 81001 URINALYSIS AUTO W/SCOPE: CPT

## 2017-12-19 PROCEDURE — 80048 BASIC METABOLIC PNL TOTAL CA: CPT

## 2017-12-19 PROCEDURE — 99285 EMERGENCY DEPT VISIT HI MDM: CPT | Mod: 25

## 2017-12-19 PROCEDURE — 80053 COMPREHEN METABOLIC PANEL: CPT

## 2017-12-19 PROCEDURE — 87086 URINE CULTURE/COLONY COUNT: CPT

## 2017-12-19 PROCEDURE — 86850 RBC ANTIBODY SCREEN: CPT

## 2017-12-19 PROCEDURE — 87186 SC STD MICRODIL/AGAR DIL: CPT

## 2017-12-19 PROCEDURE — 85610 PROTHROMBIN TIME: CPT

## 2017-12-19 PROCEDURE — 86900 BLOOD TYPING SEROLOGIC ABO: CPT

## 2017-12-19 PROCEDURE — 96375 TX/PRO/DX INJ NEW DRUG ADDON: CPT

## 2017-12-19 PROCEDURE — 85025 COMPLETE CBC W/AUTO DIFF WBC: CPT

## 2017-12-19 PROCEDURE — 83036 HEMOGLOBIN GLYCOSYLATED A1C: CPT

## 2017-12-19 PROCEDURE — 82803 BLOOD GASES ANY COMBINATION: CPT

## 2017-12-19 PROCEDURE — 96374 THER/PROPH/DIAG INJ IV PUSH: CPT | Mod: XU

## 2017-12-19 PROCEDURE — 71045 X-RAY EXAM CHEST 1 VIEW: CPT

## 2017-12-19 PROCEDURE — 87150 DNA/RNA AMPLIFIED PROBE: CPT

## 2017-12-19 PROCEDURE — 93005 ELECTROCARDIOGRAM TRACING: CPT

## 2017-12-19 PROCEDURE — 83735 ASSAY OF MAGNESIUM: CPT

## 2017-12-19 PROCEDURE — 82607 VITAMIN B-12: CPT

## 2017-12-19 PROCEDURE — 82728 ASSAY OF FERRITIN: CPT

## 2017-12-19 PROCEDURE — 85045 AUTOMATED RETICULOCYTE COUNT: CPT

## 2017-12-19 PROCEDURE — 80076 HEPATIC FUNCTION PANEL: CPT

## 2017-12-19 PROCEDURE — 86901 BLOOD TYPING SEROLOGIC RH(D): CPT

## 2017-12-19 PROCEDURE — 84466 ASSAY OF TRANSFERRIN: CPT

## 2017-12-19 PROCEDURE — 83605 ASSAY OF LACTIC ACID: CPT

## 2017-12-19 PROCEDURE — 84100 ASSAY OF PHOSPHORUS: CPT

## 2017-12-19 PROCEDURE — 36415 COLL VENOUS BLD VENIPUNCTURE: CPT

## 2017-12-19 PROCEDURE — 74177 CT ABD & PELVIS W/CONTRAST: CPT

## 2017-12-19 PROCEDURE — 87040 BLOOD CULTURE FOR BACTERIA: CPT

## 2017-12-19 PROCEDURE — 85027 COMPLETE CBC AUTOMATED: CPT

## 2018-02-23 ENCOUNTER — INPATIENT (INPATIENT)
Facility: HOSPITAL | Age: 73
LOS: 4 days | Discharge: HOME | DRG: 872 | End: 2018-02-28
Attending: SPECIALIST | Admitting: SPECIALIST
Payer: MEDICARE

## 2018-02-23 VITALS
SYSTOLIC BLOOD PRESSURE: 165 MMHG | TEMPERATURE: 101 F | WEIGHT: 149.91 LBS | OXYGEN SATURATION: 98 % | RESPIRATION RATE: 18 BRPM | HEART RATE: 117 BPM | HEIGHT: 64 IN | DIASTOLIC BLOOD PRESSURE: 81 MMHG

## 2018-02-23 DIAGNOSIS — Z98.890 OTHER SPECIFIED POSTPROCEDURAL STATES: Chronic | ICD-10-CM

## 2018-02-23 LAB
ALBUMIN SERPL ELPH-MCNC: 3.5 G/DL — SIGNIFICANT CHANGE UP (ref 3.3–5)
ALP SERPL-CCNC: 69 U/L — SIGNIFICANT CHANGE UP (ref 40–120)
ALT FLD-CCNC: 26 U/L — SIGNIFICANT CHANGE UP (ref 10–45)
ANION GAP SERPL CALC-SCNC: 15 MMOL/L — SIGNIFICANT CHANGE UP (ref 5–17)
APPEARANCE UR: (no result)
APTT BLD: 28.6 SEC — SIGNIFICANT CHANGE UP (ref 27.5–37.4)
AST SERPL-CCNC: 43 U/L — HIGH (ref 10–40)
BACTERIA # UR AUTO: (no result) /HPF
BASE EXCESS BLDV CALC-SCNC: 4.7 MMOL/L — SIGNIFICANT CHANGE UP
BASOPHILS NFR BLD AUTO: 0.1 % — SIGNIFICANT CHANGE UP (ref 0–2)
BILIRUB SERPL-MCNC: 0.5 MG/DL — SIGNIFICANT CHANGE UP (ref 0.2–1.2)
BILIRUB UR-MCNC: NEGATIVE — SIGNIFICANT CHANGE UP
BUN SERPL-MCNC: 24 MG/DL — HIGH (ref 7–23)
CA-I SERPL-SCNC: 1.04 MMOL/L — LOW (ref 1.12–1.3)
CALCIUM SERPL-MCNC: 8.8 MG/DL — SIGNIFICANT CHANGE UP (ref 8.4–10.5)
CHLORIDE SERPL-SCNC: 90 MMOL/L — LOW (ref 96–108)
CK MB CFR SERPL CALC: <1 NG/ML — SIGNIFICANT CHANGE UP (ref 0–6.7)
CK SERPL-CCNC: 218 U/L — HIGH (ref 25–170)
CO2 SERPL-SCNC: 26 MMOL/L — SIGNIFICANT CHANGE UP (ref 22–31)
COLOR SPEC: YELLOW — SIGNIFICANT CHANGE UP
CREAT SERPL-MCNC: 1.05 MG/DL — SIGNIFICANT CHANGE UP (ref 0.5–1.3)
DIFF PNL FLD: (no result)
EPI CELLS # UR: (no result) /HPF (ref 0–5)
GAS PNL BLDV: 131 MMOL/L — LOW (ref 138–146)
GAS PNL BLDV: SIGNIFICANT CHANGE UP
GAS PNL BLDV: SIGNIFICANT CHANGE UP
GLUCOSE SERPL-MCNC: 390 MG/DL — HIGH (ref 70–99)
GLUCOSE UR QL: 100
HCO3 BLDV-SCNC: 29 MMOL/L — HIGH (ref 20–27)
HCT VFR BLD CALC: 35.8 % — SIGNIFICANT CHANGE UP (ref 34.5–45)
HGB BLD-MCNC: 11.5 G/DL — SIGNIFICANT CHANGE UP (ref 11.5–15.5)
INR BLD: 1.4 — HIGH (ref 0.88–1.16)
KETONES UR-MCNC: NEGATIVE — SIGNIFICANT CHANGE UP
LACTATE SERPL-SCNC: 1.9 MMOL/L — SIGNIFICANT CHANGE UP (ref 0.5–2)
LACTATE SERPL-SCNC: 2.2 MMOL/L — HIGH (ref 0.5–2)
LEUKOCYTE ESTERASE UR-ACNC: (no result)
LIDOCAIN IGE QN: 12 U/L — SIGNIFICANT CHANGE UP (ref 7–60)
LYMPHOCYTES # BLD AUTO: 8 % — LOW (ref 13–44)
MCHC RBC-ENTMCNC: 25.8 PG — LOW (ref 27–34)
MCHC RBC-ENTMCNC: 32.1 G/DL — SIGNIFICANT CHANGE UP (ref 32–36)
MCV RBC AUTO: 80.4 FL — SIGNIFICANT CHANGE UP (ref 80–100)
MONOCYTES NFR BLD AUTO: 7.2 % — SIGNIFICANT CHANGE UP (ref 2–14)
NEUTROPHILS NFR BLD AUTO: 84.7 % — HIGH (ref 43–77)
NITRITE UR-MCNC: NEGATIVE — SIGNIFICANT CHANGE UP
PCO2 BLDV: 42 MMHG — SIGNIFICANT CHANGE UP (ref 41–51)
PH BLDV: 7.46 — HIGH (ref 7.32–7.43)
PH UR: 5.5 — SIGNIFICANT CHANGE UP (ref 5–8)
PLATELET # BLD AUTO: 125 K/UL — LOW (ref 150–400)
PO2 BLDV: 37 MMHG — SIGNIFICANT CHANGE UP
POTASSIUM BLDV-SCNC: 4.1 MMOL/L — SIGNIFICANT CHANGE UP (ref 3.5–4.9)
POTASSIUM SERPL-MCNC: 4 MMOL/L — SIGNIFICANT CHANGE UP (ref 3.5–5.3)
POTASSIUM SERPL-SCNC: 4 MMOL/L — SIGNIFICANT CHANGE UP (ref 3.5–5.3)
PROT SERPL-MCNC: 7.8 G/DL — SIGNIFICANT CHANGE UP (ref 6–8.3)
PROT UR-MCNC: 30 MG/DL
PROTHROM AB SERPL-ACNC: 15.6 SEC — HIGH (ref 9.8–12.7)
RBC # BLD: 4.45 M/UL — SIGNIFICANT CHANGE UP (ref 3.8–5.2)
RBC # FLD: 13.6 % — SIGNIFICANT CHANGE UP (ref 10.3–16.9)
RBC CASTS # UR COMP ASSIST: < 5 /HPF — SIGNIFICANT CHANGE UP
SAO2 % BLDV: 74 % — SIGNIFICANT CHANGE UP
SODIUM SERPL-SCNC: 131 MMOL/L — LOW (ref 135–145)
SP GR SPEC: 1.02 — SIGNIFICANT CHANGE UP (ref 1–1.03)
TROPONIN T SERPL-MCNC: <0.01 NG/ML — SIGNIFICANT CHANGE UP (ref 0–0.01)
UROBILINOGEN FLD QL: 0.2 E.U./DL — SIGNIFICANT CHANGE UP
WBC # BLD: 12.4 K/UL — HIGH (ref 3.8–10.5)
WBC # FLD AUTO: 12.4 K/UL — HIGH (ref 3.8–10.5)
WBC UR QL: (no result) /HPF

## 2018-02-23 PROCEDURE — 71045 X-RAY EXAM CHEST 1 VIEW: CPT | Mod: 26

## 2018-02-23 PROCEDURE — 74177 CT ABD & PELVIS W/CONTRAST: CPT | Mod: 26

## 2018-02-23 PROCEDURE — 99285 EMERGENCY DEPT VISIT HI MDM: CPT | Mod: 25

## 2018-02-23 RX ORDER — CEFTRIAXONE 500 MG/1
2 INJECTION, POWDER, FOR SOLUTION INTRAMUSCULAR; INTRAVENOUS ONCE
Qty: 0 | Refills: 0 | Status: COMPLETED | OUTPATIENT
Start: 2018-02-23 | End: 2018-02-23

## 2018-02-23 RX ORDER — ACETAMINOPHEN 500 MG
650 TABLET ORAL ONCE
Qty: 0 | Refills: 0 | Status: COMPLETED | OUTPATIENT
Start: 2018-02-23 | End: 2018-02-23

## 2018-02-23 RX ORDER — INSULIN HUMAN 100 [IU]/ML
6 INJECTION, SOLUTION SUBCUTANEOUS ONCE
Qty: 0 | Refills: 0 | Status: COMPLETED | OUTPATIENT
Start: 2018-02-23 | End: 2018-02-23

## 2018-02-23 RX ORDER — INSULIN HUMAN 100 [IU]/ML
6 INJECTION, SOLUTION SUBCUTANEOUS ONCE
Qty: 0 | Refills: 0 | Status: DISCONTINUED | OUTPATIENT
Start: 2018-02-23 | End: 2018-02-23

## 2018-02-23 RX ORDER — SODIUM CHLORIDE 9 MG/ML
1000 INJECTION INTRAMUSCULAR; INTRAVENOUS; SUBCUTANEOUS ONCE
Qty: 0 | Refills: 0 | Status: COMPLETED | OUTPATIENT
Start: 2018-02-23 | End: 2018-02-23

## 2018-02-23 RX ORDER — CEFTRIAXONE 500 MG/1
2 INJECTION, POWDER, FOR SOLUTION INTRAMUSCULAR; INTRAVENOUS ONCE
Qty: 0 | Refills: 0 | Status: DISCONTINUED | OUTPATIENT
Start: 2018-02-23 | End: 2018-02-23

## 2018-02-23 RX ORDER — ACETAMINOPHEN 500 MG
650 TABLET ORAL ONCE
Qty: 0 | Refills: 0 | Status: DISCONTINUED | OUTPATIENT
Start: 2018-02-23 | End: 2018-02-24

## 2018-02-23 RX ADMIN — INSULIN HUMAN 6 UNIT(S): 100 INJECTION, SOLUTION SUBCUTANEOUS at 19:33

## 2018-02-23 RX ADMIN — Medication 650 MILLIGRAM(S): at 22:17

## 2018-02-23 RX ADMIN — CEFTRIAXONE 100 GRAM(S): 500 INJECTION, POWDER, FOR SOLUTION INTRAMUSCULAR; INTRAVENOUS at 17:24

## 2018-02-23 RX ADMIN — SODIUM CHLORIDE 1000 MILLILITER(S): 9 INJECTION INTRAMUSCULAR; INTRAVENOUS; SUBCUTANEOUS at 17:04

## 2018-02-23 RX ADMIN — Medication 650 MILLIGRAM(S): at 21:37

## 2018-02-23 RX ADMIN — Medication 650 MILLIGRAM(S): at 17:04

## 2018-02-23 NOTE — ED ADULT TRIAGE NOTE - CHIEF COMPLAINT QUOTE
Pt CO Abd Pain x3 days with Fevers.  Pt states "I checked my sugar this morning and it was high."  FSBG in progress.  PT denies N/V/D, SOB, CP

## 2018-02-23 NOTE — ED PROVIDER NOTE - OBJECTIVE STATEMENT
73 yo F with hx of DM HTN with flank pain fevers and chills x 2 days -- also mild mid abd pain- slight nausea/ no vomiting- dec po intake - no chest pain or cough or sob - no skin rashes- hx of prior klebsiella sepsis 1 year ago 71 yo F with hx of DM HTN with flank pain fevers and chills x 2 days -- also mid abd pain and llq tenderness- slight nausea/ no vomiting- dec po intake - no chest pain or cough or sob - no skin rashes- hx of prior klebsiella sepsis 1 year ago

## 2018-02-23 NOTE — ED PROVIDER NOTE - MEDICAL DECISION MAKING DETAILS
73 yo F with fevers chills nausea  flank pain x 2 days- ? pyelo lower susp for flu or pneumonia - await u/a and labs   no ekg changes 71 yo F with fevers chills nausea  flank pain x 2 days- ? pyelo lower susp for flu or pneumonia - await u/a and labs   no ekg changes await CT  abd pelvis  if neg admit to medicine under ezequiel

## 2018-02-23 NOTE — ED ADULT NURSE REASSESSMENT NOTE - NS ED NURSE REASSESS COMMENT FT1
Pt febrile. MD aware. Will follow up with orders. Pt assisted to restroom w/o incident. Pt placed on stretcher. Safety precautions in place. Close monitoring continues.

## 2018-02-23 NOTE — ED ADULT NURSE NOTE - OBJECTIVE STATEMENT
72y F, A&ox3, presents to ED for generalized abdominal pain, nausea, polyuria, burning urinating and lower back pain x2 days. Pt reports hx of UTI. Denies cp, sob, wheezing. Lungs clear bilateral. No jvd, no edema. +bowel sounds, last bm 2 days ago, +flatulance. Sepsis code called due to abnormal vital signs. Labs, culture, urine sent. MD Glattcatalino at bedside. Will continue to monitor.

## 2018-02-23 NOTE — ED PROVIDER NOTE - CARE PLAN
Principal Discharge DX:	Flank pain  Secondary Diagnosis:	Fever Principal Discharge DX:	Flank pain  Secondary Diagnosis:	Fever  Secondary Diagnosis:	Pyelonephritis

## 2018-02-23 NOTE — CONSULT NOTE ADULT - ASSESSMENT
most likely pyelo  less likely acute appy vs acute dwaine    recs:  iv abx  admit to medicine  serial abd exams Assessment: 72y Female with acute left-sided pyelonephritis. Unlikely to have acute appendicitis. No gallstones or GB wall edema to suggest cholecystitis either.     Recommendations:  - Admit to medicine  - IV Abx per primary team for pyelo  - f/u with GI doctor for next colonoscopy  - discussed with Chief on call  - call surgery consult phone with questions  - signing off, reconsult if needed

## 2018-02-23 NOTE — ED ADULT NURSE REASSESSMENT NOTE - NS ED NURSE REASSESS COMMENT FT1
Rec'd pt calm , in stable condition. reported feeling chills. Pt febrile. MD aware and pt medicated as ordered. Safety precautions in place. Close monitoring continues.

## 2018-02-24 DIAGNOSIS — E11.9 TYPE 2 DIABETES MELLITUS WITHOUT COMPLICATIONS: ICD-10-CM

## 2018-02-24 DIAGNOSIS — Z29.9 ENCOUNTER FOR PROPHYLACTIC MEASURES, UNSPECIFIED: ICD-10-CM

## 2018-02-24 DIAGNOSIS — I10 ESSENTIAL (PRIMARY) HYPERTENSION: ICD-10-CM

## 2018-02-24 DIAGNOSIS — A41.9 SEPSIS, UNSPECIFIED ORGANISM: ICD-10-CM

## 2018-02-24 DIAGNOSIS — E87.1 HYPO-OSMOLALITY AND HYPONATREMIA: ICD-10-CM

## 2018-02-24 DIAGNOSIS — N12 TUBULO-INTERSTITIAL NEPHRITIS, NOT SPECIFIED AS ACUTE OR CHRONIC: ICD-10-CM

## 2018-02-24 DIAGNOSIS — A41.4 SEPSIS DUE TO ANAEROBES: ICD-10-CM

## 2018-02-24 DIAGNOSIS — H40.9 UNSPECIFIED GLAUCOMA: ICD-10-CM

## 2018-02-24 DIAGNOSIS — R63.8 OTHER SYMPTOMS AND SIGNS CONCERNING FOOD AND FLUID INTAKE: ICD-10-CM

## 2018-02-24 LAB
-  K. PNEUMONIAE GROUP: SIGNIFICANT CHANGE UP
ANION GAP SERPL CALC-SCNC: 13 MMOL/L — SIGNIFICANT CHANGE UP (ref 5–17)
BUN SERPL-MCNC: 22 MG/DL — SIGNIFICANT CHANGE UP (ref 7–23)
CALCIUM SERPL-MCNC: 8.2 MG/DL — LOW (ref 8.4–10.5)
CHLORIDE SERPL-SCNC: 99 MMOL/L — SIGNIFICANT CHANGE UP (ref 96–108)
CK SERPL-CCNC: 336 U/L — HIGH (ref 25–170)
CO2 SERPL-SCNC: 25 MMOL/L — SIGNIFICANT CHANGE UP (ref 22–31)
CREAT SERPL-MCNC: 1 MG/DL — SIGNIFICANT CHANGE UP (ref 0.5–1.3)
CULTURE RESULTS: SIGNIFICANT CHANGE UP
GLUCOSE BLDC GLUCOMTR-MCNC: 107 MG/DL — HIGH (ref 70–99)
GLUCOSE BLDC GLUCOMTR-MCNC: 121 MG/DL — HIGH (ref 70–99)
GLUCOSE BLDC GLUCOMTR-MCNC: 140 MG/DL — HIGH (ref 70–99)
GLUCOSE BLDC GLUCOMTR-MCNC: 143 MG/DL — HIGH (ref 70–99)
GLUCOSE BLDC GLUCOMTR-MCNC: 99 MG/DL — SIGNIFICANT CHANGE UP (ref 70–99)
GLUCOSE SERPL-MCNC: 166 MG/DL — HIGH (ref 70–99)
GRAM STN FLD: SIGNIFICANT CHANGE UP
HBA1C BLD-MCNC: 11.2 % — HIGH (ref 4–5.6)
HCT VFR BLD CALC: 32.1 % — LOW (ref 34.5–45)
HGB BLD-MCNC: 10.1 G/DL — LOW (ref 11.5–15.5)
MCHC RBC-ENTMCNC: 25.6 PG — LOW (ref 27–34)
MCHC RBC-ENTMCNC: 31.5 G/DL — LOW (ref 32–36)
MCV RBC AUTO: 81.5 FL — SIGNIFICANT CHANGE UP (ref 80–100)
METHOD TYPE: SIGNIFICANT CHANGE UP
OSMOLALITY SERPL: 293 MOSM/KG — SIGNIFICANT CHANGE UP (ref 280–301)
OSMOLALITY UR: 366 MOSMOL/KG — SIGNIFICANT CHANGE UP (ref 100–650)
PLATELET # BLD AUTO: 93 K/UL — LOW (ref 150–400)
POTASSIUM SERPL-MCNC: 3.8 MMOL/L — SIGNIFICANT CHANGE UP (ref 3.5–5.3)
POTASSIUM SERPL-SCNC: 3.8 MMOL/L — SIGNIFICANT CHANGE UP (ref 3.5–5.3)
RBC # BLD: 3.94 M/UL — SIGNIFICANT CHANGE UP (ref 3.8–5.2)
RBC # FLD: 14.2 % — SIGNIFICANT CHANGE UP (ref 10.3–16.9)
SODIUM SERPL-SCNC: 137 MMOL/L — SIGNIFICANT CHANGE UP (ref 135–145)
SODIUM UR-SCNC: 77 MMOL/L — SIGNIFICANT CHANGE UP
SPECIMEN SOURCE: SIGNIFICANT CHANGE UP
TSH SERPL-MCNC: 1.15 UIU/ML — SIGNIFICANT CHANGE UP (ref 0.35–4.94)
UUN UR-MCNC: 412 MG/DL — SIGNIFICANT CHANGE UP
WBC # BLD: 10.6 K/UL — HIGH (ref 3.8–10.5)
WBC # FLD AUTO: 10.6 K/UL — HIGH (ref 3.8–10.5)

## 2018-02-24 RX ORDER — PIPERACILLIN AND TAZOBACTAM 4; .5 G/20ML; G/20ML
4.5 INJECTION, POWDER, LYOPHILIZED, FOR SOLUTION INTRAVENOUS EVERY 8 HOURS
Qty: 0 | Refills: 0 | Status: DISCONTINUED | OUTPATIENT
Start: 2018-02-24 | End: 2018-02-24

## 2018-02-24 RX ORDER — DEXTROSE 50 % IN WATER 50 %
1 SYRINGE (ML) INTRAVENOUS ONCE
Qty: 0 | Refills: 0 | Status: DISCONTINUED | OUTPATIENT
Start: 2018-02-24 | End: 2018-02-28

## 2018-02-24 RX ORDER — PIPERACILLIN AND TAZOBACTAM 4; .5 G/20ML; G/20ML
4.5 INJECTION, POWDER, LYOPHILIZED, FOR SOLUTION INTRAVENOUS EVERY 6 HOURS
Qty: 0 | Refills: 0 | Status: DISCONTINUED | OUTPATIENT
Start: 2018-02-24 | End: 2018-02-26

## 2018-02-24 RX ORDER — INSULIN LISPRO 100/ML
5 VIAL (ML) SUBCUTANEOUS
Qty: 0 | Refills: 0 | Status: DISCONTINUED | OUTPATIENT
Start: 2018-02-24 | End: 2018-02-28

## 2018-02-24 RX ORDER — DEXTROSE 50 % IN WATER 50 %
25 SYRINGE (ML) INTRAVENOUS ONCE
Qty: 0 | Refills: 0 | Status: DISCONTINUED | OUTPATIENT
Start: 2018-02-24 | End: 2018-02-28

## 2018-02-24 RX ORDER — ACETAMINOPHEN 500 MG
650 TABLET ORAL EVERY 6 HOURS
Qty: 0 | Refills: 0 | Status: DISCONTINUED | OUTPATIENT
Start: 2018-02-24 | End: 2018-02-28

## 2018-02-24 RX ORDER — ACETAMINOPHEN 500 MG
650 TABLET ORAL ONCE
Qty: 0 | Refills: 0 | Status: COMPLETED | OUTPATIENT
Start: 2018-02-24 | End: 2018-02-24

## 2018-02-24 RX ORDER — INSULIN LISPRO 100/ML
VIAL (ML) SUBCUTANEOUS
Qty: 0 | Refills: 0 | Status: DISCONTINUED | OUTPATIENT
Start: 2018-02-24 | End: 2018-02-28

## 2018-02-24 RX ORDER — INSULIN GLARGINE 100 [IU]/ML
10 INJECTION, SOLUTION SUBCUTANEOUS AT BEDTIME
Qty: 0 | Refills: 0 | Status: DISCONTINUED | OUTPATIENT
Start: 2018-02-24 | End: 2018-02-28

## 2018-02-24 RX ORDER — SODIUM CHLORIDE 9 MG/ML
1000 INJECTION, SOLUTION INTRAVENOUS
Qty: 0 | Refills: 0 | Status: DISCONTINUED | OUTPATIENT
Start: 2018-02-24 | End: 2018-02-28

## 2018-02-24 RX ORDER — DEXTROSE 50 % IN WATER 50 %
12.5 SYRINGE (ML) INTRAVENOUS ONCE
Qty: 0 | Refills: 0 | Status: DISCONTINUED | OUTPATIENT
Start: 2018-02-24 | End: 2018-02-28

## 2018-02-24 RX ORDER — SODIUM CHLORIDE 9 MG/ML
1000 INJECTION INTRAMUSCULAR; INTRAVENOUS; SUBCUTANEOUS ONCE
Qty: 0 | Refills: 0 | Status: COMPLETED | OUTPATIENT
Start: 2018-02-24 | End: 2018-02-24

## 2018-02-24 RX ORDER — GLUCAGON INJECTION, SOLUTION 0.5 MG/.1ML
1 INJECTION, SOLUTION SUBCUTANEOUS ONCE
Qty: 0 | Refills: 0 | Status: DISCONTINUED | OUTPATIENT
Start: 2018-02-24 | End: 2018-02-28

## 2018-02-24 RX ADMIN — Medication 650 MILLIGRAM(S): at 22:00

## 2018-02-24 RX ADMIN — PIPERACILLIN AND TAZOBACTAM 200 GRAM(S): 4; .5 INJECTION, POWDER, LYOPHILIZED, FOR SOLUTION INTRAVENOUS at 03:16

## 2018-02-24 RX ADMIN — Medication 650 MILLIGRAM(S): at 15:51

## 2018-02-24 RX ADMIN — Medication 5 UNIT(S): at 09:17

## 2018-02-24 RX ADMIN — PIPERACILLIN AND TAZOBACTAM 200 GRAM(S): 4; .5 INJECTION, POWDER, LYOPHILIZED, FOR SOLUTION INTRAVENOUS at 22:00

## 2018-02-24 RX ADMIN — Medication 650 MILLIGRAM(S): at 03:17

## 2018-02-24 RX ADMIN — Medication 5 UNIT(S): at 12:40

## 2018-02-24 RX ADMIN — INSULIN GLARGINE 10 UNIT(S): 100 INJECTION, SOLUTION SUBCUTANEOUS at 22:00

## 2018-02-24 RX ADMIN — SODIUM CHLORIDE 4000 MILLILITER(S): 9 INJECTION INTRAMUSCULAR; INTRAVENOUS; SUBCUTANEOUS at 03:17

## 2018-02-24 RX ADMIN — PIPERACILLIN AND TAZOBACTAM 200 GRAM(S): 4; .5 INJECTION, POWDER, LYOPHILIZED, FOR SOLUTION INTRAVENOUS at 15:53

## 2018-02-24 RX ADMIN — PIPERACILLIN AND TAZOBACTAM 200 GRAM(S): 4; .5 INJECTION, POWDER, LYOPHILIZED, FOR SOLUTION INTRAVENOUS at 11:25

## 2018-02-24 RX ADMIN — Medication 5 UNIT(S): at 18:11

## 2018-02-24 NOTE — H&P ADULT - NSHPPHYSICALEXAM_GEN_ALL_CORE
VITAL SIGNS:  T(F): 99.2 (02-24-18 @ 01:53), Max: 103.2 (02-23-18 @ 22:25)  HR: 93 (02-24-18 @ 01:53) (92 - 117)  BP: 108/50 (02-24-18 @ 01:53) (108/50 - 190/64)  BP(mean): --  RR: 18 (02-24-18 @ 01:53) (18 - 19)  SpO2: 96% (02-24-18 @ 01:53) (95% - 98%)    PHYSICAL EXAM:    Constitutional: NAD, tired appearing, non-toxic, mildly diaphoretic  HEENT: NC/AT, PERRL, EOMI, anicteric sclera, no nasal discharge; uvula midline, no oropharyngeal erythema or exudates; very dry MM  Neck: supple; no JVD or thyromegaly  Respiratory: CTA B/L; no W/R/R, no retractions  Cardiac: +S1/S2; RRR; no M/R/G; PMI non-displaced  Gastrointestinal: soft; no rebound or guarding; +BSx4, mild suprapubic tenderness  Back: spine midline, no bony tenderness or step-offs; moderate CVAT B/L  Extremities: WWP, no clubbing or cyanosis; no peripheral edema  Musculoskeletal: NROM x4; no joint swelling, tenderness or erythema  Vascular: 2+ radial, femoral, DP/PT pulses B/L  Dermatologic: skin warm, dry and intact  Lymphatic: no submandibular or cervical LAD  Neurologic: AAOx3; no focal deficits  Psychiatric: affect and characteristics of appearance, verbalizations, behaviors are appropriate

## 2018-02-24 NOTE — H&P ADULT - NSHPLABSRESULTS_GEN_ALL_CORE
.  LABS:                         11.5   12.4  )-----------( 125      ( 2018 17:10 )             35.8         131<L>  |  90<L>  |  24<H>  ----------------------------<  390<H>  4.0   |  26  |  1.05    Ca    8.8      2018 17:10    TPro  7.8  /  Alb  3.5  /  TBili  0.5  /  DBili  x   /  AST  43<H>  /  ALT  26  /  AlkPhos  69      PT/INR - ( 2018 17:10 )   PT: 15.6 sec;   INR: 1.40          PTT - ( 2018 17:10 )  PTT:28.6 sec  Urinalysis Basic - ( 2018 17:21 )    Color: Yellow / Appearance: Cloudy / S.025 / pH: x  Gluc: x / Ketone: NEGATIVE  / Bili: Negative / Urobili: 0.2 E.U./dL   Blood: x / Protein: 30 mg/dL / Nitrite: NEGATIVE   Leuk Esterase: Moderate / RBC: < 5 /HPF / WBC Many /HPF   Sq Epi: x / Non Sq Epi: 5-10 /HPF / Bacteria: Many /HPF      CARDIAC MARKERS ( 2018 17:10 )  x     / <0.01 ng/mL / 218 U/L / x     / <1.0 ng/mL        Lactate, Blood: 1.9 mmoL/L ( @ 19:39)  Lactate, Blood: 2.2 mmoL/L ( @ 17:09)      RADIOLOGY, EKG & ADDITIONAL TESTS: Reviewed.

## 2018-02-24 NOTE — H&P ADULT - HISTORY OF PRESENT ILLNESS
71 yo F w/ PMH DM2 on insulin, HTN, previous admission in 10/2017 for pyelo p/w 3 days of L flank pain.  Associated symptoms include nausea, intermittent chills, increased urinary frequency and foul smelling urine.  Also also reports diffuse abdominal pain that is dull, achy. Pain started around the same time as her urinary symptoms.  Pt was admitted in October for sepsis 2/2 R sided pyelo and dc'ed on Cipro for 10 days for Klebsiella after a 4 day hospital stay.  ROS negative for HA, changes in vision,   In the ED, VS significant for temp 101.3, , Lactate 2.2.  Labs revealed  Na 131, glc 292, UA pos for moderate LE, many WBCs.  CT a/p w/ IV cont revealed cystitis, acute left pyelonephritis and left ureteritis w/ no evidence of abscess.    CT also revealed a mildly dilated appendix, surgery was consulted and ruled out acute appendicitis. She was given 2 L NS x 1, 1 g ceftriaxone IV x 1 and admitted for sepsis 2/2 acute pyelo.

## 2018-02-24 NOTE — PROGRESS NOTE ADULT - SUBJECTIVE AND OBJECTIVE BOX
71 yo F w/ PMH DM2 on insulin, HTN, previous admission in 10/2017 for pyelo and klesiella sepsis  p/w 3 days of L flank pain.  Associated symptoms include nausea, intermittent chills, increased urinary frequency and foul smelling urine.  Also also reports diffuse abdominal pain that is dull, achy. Pain started around the same time as her urinary symptoms.   In the ED, VS significant for temp 101.3, , Lactate 2.2.  Labs revealed  Na 131, glc 292, UA pos for moderate LE, many WBCs.  CT a/p w/ IV cont revealed cystitis, acute left pyelonephritis and left ureteritis w/ no evidence of abscess  admitted for sepsis 2/2 acute pyelo. (24 Feb 2018 01:37)      REVIEW OF SYSTEMS:  Constitutional: had fever,no weight loss or fatigue  ENMT:  No difficulty hearing, tinnitus, vertigo; No sinus or throat pain  Respiratory: No cough, wheezing, chills or hemoptysis  Cardiovascular: No chest pain, palpitations, dizziness or leg swelling  Gastrointestinal:  No nausea, vomiting or hematemesis; No diarrhea or constipation. No melena or hematochezia.  Skin: No itching, burning, rashes or lesions   Musculoskeletal: No joint pain or swelling; No muscle, back or extremity pain    PAST MEDICAL & SURGICAL HISTORY:  HTN (hypertension)  Diabetes  History of thyroid surgery      FAMILY HISTORY:  No pertinent family history in first degree relatives      SOCIAL HISTORY:  Smoking Status: [ ] Current, [ ] Former, [ ] Never  Pack Years:    MEDICATIONS:  MEDICATIONS  (STANDING):  dextrose 5%. 1000 milliLiter(s) (50 mL/Hr) IV Continuous <Continuous>  dextrose 50% Injectable 12.5 Gram(s) IV Push once  dextrose 50% Injectable 25 Gram(s) IV Push once  dextrose 50% Injectable 25 Gram(s) IV Push once  insulin glargine Injectable (LANTUS) 10 Unit(s) SubCutaneous at bedtime  insulin lispro (HumaLOG) corrective regimen sliding scale   SubCutaneous Before meals and at bedtime  insulin lispro Injectable (HumaLOG) 5 Unit(s) SubCutaneous three times a day before meals  piperacillin/tazobactam IVPB. 4.5 Gram(s) IV Intermittent every 6 hours    MEDICATIONS  (PRN):  acetaminophen   Tablet 650 milliGRAM(s) Oral every 6 hours PRN For Temp greater than 38 C (100.4 F)  dextrose Gel 1 Dose(s) Oral once PRN Blood Glucose LESS THAN 70 milliGRAM(s)/deciliter  glucagon  Injectable 1 milliGRAM(s) IntraMuscular once PRN Glucose LESS THAN 70 milligrams/deciliter      Allergies    No Known Allergies    Intolerances        Vital Signs Last 24 Hrs  T(C): 36.3 (24 Feb 2018 08:51), Max: 39.6 (23 Feb 2018 22:25)  T(F): 97.4 (24 Feb 2018 08:51), Max: 103.2 (23 Feb 2018 22:25)  HR: 80 (24 Feb 2018 08:51) (80 - 117)  BP: 155/68 (24 Feb 2018 08:51) (108/50 - 190/64)  BP(mean): --  RR: 19 (24 Feb 2018 08:51) (16 - 19)  SpO2: 99% (24 Feb 2018 08:51) (95% - 99%)        PHYSICAL EXAM:    General: Well developed; well nourished; in no acute distress  HEENT: MMM, conjunctiva and sclera clear  Lungs: clear  Heart: regular  Gastrointestinal: Soft, non-tender non-distended; Normal bowel sounds; No rebound or guarding  Extremities: Normal range of motion, No clubbing, cyanosis or edema  Neurological: Alert and oriented x3  Skin: Warm and dry. No obvious rash      LABS:                        10.1   10.6  )-----------( 93       ( 24 Feb 2018 08:17 )             32.1     02-24    137  |  99  |  22  ----------------------------<  166<H>  3.8   |  25  |  1.00    Ca    8.2<L>      24 Feb 2018 08:17    TPro  7.8  /  Alb  3.5  /  TBili  0.5  /  DBili  x   /  AST  43<H>  /  ALT  26  /  AlkPhos  69  02-23      Culture Results:   Aerobic and Anaerobic Bottle: Culture in progress (02-23 @ 22:38)  Culture Results:   Aerobic and Anaerobic Bottle: Culture in progress (02-23 @ 22:38)  Culture Results:   Specimen appears CONTAMINATED. Lab suggests repeat clean catch specimen. (02-23 @ 18:13)      RADIOLOGY & ADDITIONAL STUDIES:

## 2018-02-24 NOTE — H&P ADULT - PROBLEM SELECTOR PLAN 1
2/2 acute pyelo.  S/p 1 g ceftriaxone in ED.  Lactate improved w/ fluids.  CXR unchanged from previous. Given hx of DM, will broaden coverage.   - STAT zosyn   - F/u urine cultures and de-escalate as appropriate  - Tylenol PRN fever  - F/u blood cx

## 2018-02-24 NOTE — H&P ADULT - NSHPSOCIALHISTORY_GEN_ALL_CORE
Ambulates w/ walker, lives w/ grand-daughter, has HHA on MWF for 5 hrs/day. Denies smoking, alcohol, illicit drug use.

## 2018-02-24 NOTE — H&P ADULT - PROBLEM SELECTOR PLAN 5
Pt reports decrease PO intake over past few days.  Appears hypovolemic on exam.   - F/u serum osmolality, urine studies  - will give another 1 L NS bolus as pt still appears clinically dry

## 2018-02-24 NOTE — PROVIDER CONTACT NOTE (CRITICAL VALUE NOTIFICATION) - SITUATION
1 Aerobic bottle ( Gram stain )  positive Gram negative Rods  2 Anaerobic bottle ( Gram stain)  both positive  Gram Negative Rods
Aerobic bottle ( 2nd) positive blood culture Gram negative rods

## 2018-02-24 NOTE — H&P ADULT - ASSESSMENT
73 yo F PMH DM2 on insulin, HTN, previous admission for R pyelo presenting with b/l flank pain, nausea, and increased urinary frequency found to have acute L pyelonephritis.

## 2018-02-25 LAB
ANION GAP SERPL CALC-SCNC: 12 MMOL/L — SIGNIFICANT CHANGE UP (ref 5–17)
BUN SERPL-MCNC: 12 MG/DL — SIGNIFICANT CHANGE UP (ref 7–23)
CALCIUM SERPL-MCNC: 8.8 MG/DL — SIGNIFICANT CHANGE UP (ref 8.4–10.5)
CHLORIDE SERPL-SCNC: 98 MMOL/L — SIGNIFICANT CHANGE UP (ref 96–108)
CO2 SERPL-SCNC: 28 MMOL/L — SIGNIFICANT CHANGE UP (ref 22–31)
CREAT SERPL-MCNC: 0.95 MG/DL — SIGNIFICANT CHANGE UP (ref 0.5–1.3)
GLUCOSE BLDC GLUCOMTR-MCNC: 110 MG/DL — HIGH (ref 70–99)
GLUCOSE BLDC GLUCOMTR-MCNC: 111 MG/DL — HIGH (ref 70–99)
GLUCOSE BLDC GLUCOMTR-MCNC: 140 MG/DL — HIGH (ref 70–99)
GLUCOSE BLDC GLUCOMTR-MCNC: 153 MG/DL — HIGH (ref 70–99)
GLUCOSE SERPL-MCNC: 109 MG/DL — HIGH (ref 70–99)
HCT VFR BLD CALC: 32.1 % — LOW (ref 34.5–45)
HGB BLD-MCNC: 10.4 G/DL — LOW (ref 11.5–15.5)
MAGNESIUM SERPL-MCNC: 1.6 MG/DL — SIGNIFICANT CHANGE UP (ref 1.6–2.6)
MCHC RBC-ENTMCNC: 26 PG — LOW (ref 27–34)
MCHC RBC-ENTMCNC: 32.4 G/DL — SIGNIFICANT CHANGE UP (ref 32–36)
MCV RBC AUTO: 80.3 FL — SIGNIFICANT CHANGE UP (ref 80–100)
PLATELET # BLD AUTO: 88 K/UL — LOW (ref 150–400)
POTASSIUM SERPL-MCNC: 3.5 MMOL/L — SIGNIFICANT CHANGE UP (ref 3.5–5.3)
POTASSIUM SERPL-SCNC: 3.5 MMOL/L — SIGNIFICANT CHANGE UP (ref 3.5–5.3)
RBC # BLD: 4 M/UL — SIGNIFICANT CHANGE UP (ref 3.8–5.2)
RBC # FLD: 13.9 % — SIGNIFICANT CHANGE UP (ref 10.3–16.9)
SODIUM SERPL-SCNC: 138 MMOL/L — SIGNIFICANT CHANGE UP (ref 135–145)
WBC # BLD: 7.1 K/UL — SIGNIFICANT CHANGE UP (ref 3.8–10.5)
WBC # FLD AUTO: 7.1 K/UL — SIGNIFICANT CHANGE UP (ref 3.8–10.5)

## 2018-02-25 RX ORDER — POTASSIUM CHLORIDE 20 MEQ
40 PACKET (EA) ORAL ONCE
Qty: 0 | Refills: 0 | Status: COMPLETED | OUTPATIENT
Start: 2018-02-25 | End: 2018-02-25

## 2018-02-25 RX ORDER — MAGNESIUM OXIDE 400 MG ORAL TABLET 241.3 MG
800 TABLET ORAL ONCE
Qty: 0 | Refills: 0 | Status: COMPLETED | OUTPATIENT
Start: 2018-02-25 | End: 2018-02-25

## 2018-02-25 RX ORDER — LISINOPRIL 2.5 MG/1
40 TABLET ORAL DAILY
Qty: 0 | Refills: 0 | Status: DISCONTINUED | OUTPATIENT
Start: 2018-02-25 | End: 2018-02-28

## 2018-02-25 RX ADMIN — MAGNESIUM OXIDE 400 MG ORAL TABLET 800 MILLIGRAM(S): 241.3 TABLET ORAL at 12:43

## 2018-02-25 RX ADMIN — PIPERACILLIN AND TAZOBACTAM 200 GRAM(S): 4; .5 INJECTION, POWDER, LYOPHILIZED, FOR SOLUTION INTRAVENOUS at 09:30

## 2018-02-25 RX ADMIN — LISINOPRIL 40 MILLIGRAM(S): 2.5 TABLET ORAL at 12:43

## 2018-02-25 RX ADMIN — PIPERACILLIN AND TAZOBACTAM 200 GRAM(S): 4; .5 INJECTION, POWDER, LYOPHILIZED, FOR SOLUTION INTRAVENOUS at 02:29

## 2018-02-25 RX ADMIN — Medication 40 MILLIEQUIVALENT(S): at 12:43

## 2018-02-25 RX ADMIN — PIPERACILLIN AND TAZOBACTAM 200 GRAM(S): 4; .5 INJECTION, POWDER, LYOPHILIZED, FOR SOLUTION INTRAVENOUS at 15:22

## 2018-02-25 RX ADMIN — Medication 5 UNIT(S): at 12:45

## 2018-02-25 RX ADMIN — Medication 650 MILLIGRAM(S): at 07:43

## 2018-02-25 RX ADMIN — Medication 2: at 12:44

## 2018-02-25 RX ADMIN — Medication 5 UNIT(S): at 08:40

## 2018-02-25 RX ADMIN — INSULIN GLARGINE 10 UNIT(S): 100 INJECTION, SOLUTION SUBCUTANEOUS at 22:17

## 2018-02-25 RX ADMIN — PIPERACILLIN AND TAZOBACTAM 200 GRAM(S): 4; .5 INJECTION, POWDER, LYOPHILIZED, FOR SOLUTION INTRAVENOUS at 21:16

## 2018-02-25 NOTE — CONSULT NOTE ADULT - ASSESSMENT
71 yo F PMH DM2 on insulin, HTN, previous admission for R pyelo presenting with b/l flank pain, nausea, and increased urinary frequency found to have acute L pyelonephritis.

## 2018-02-25 NOTE — PROGRESS NOTE ADULT - SUBJECTIVE AND OBJECTIVE BOX
SUBJECTIVE / INTERVAL HPI: No major events overnight. Patient seen and examined at bedside.     VITAL SIGNS:  Vital Signs Last 24 Hrs  T(C): 37.1 (2018 05:10), Max: 38.9 (2018 21:46)  T(F): 98.7 (2018 05:10), Max: 102 (2018 21:46)  HR: 92 (2018 05:10) (80 - 102)  BP: 191/73 (2018 05:10) (131/62 - 191/73)  BP(mean): --  RR: 18 (2018 05:10) (17 - 20)  SpO2: 97% (2018 05:10) (97% - 99%)    PHYSICAL EXAM:    General: Well developed; well nourished; in no acute distress  HEENT: MMM, conjunctiva and sclera clear  Lungs: clear  Heart: regular  Gastrointestinal: Soft, non-tender non-distended; Normal bowel sounds; No rebound or guarding  Extremities: Normal range of motion, No clubbing, cyanosis or edema  Neurological: Alert and oriented x3  Skin: Warm and dry. No obvious rash      MEDICATIONS:  MEDICATIONS  (STANDING):  dextrose 5%. 1000 milliLiter(s) (50 mL/Hr) IV Continuous <Continuous>  dextrose 50% Injectable 12.5 Gram(s) IV Push once  dextrose 50% Injectable 25 Gram(s) IV Push once  dextrose 50% Injectable 25 Gram(s) IV Push once  insulin glargine Injectable (LANTUS) 10 Unit(s) SubCutaneous at bedtime  insulin lispro (HumaLOG) corrective regimen sliding scale   SubCutaneous Before meals and at bedtime  insulin lispro Injectable (HumaLOG) 5 Unit(s) SubCutaneous three times a day before meals  piperacillin/tazobactam IVPB. 4.5 Gram(s) IV Intermittent every 6 hours    MEDICATIONS  (PRN):  acetaminophen   Tablet 650 milliGRAM(s) Oral every 6 hours PRN For Temp greater than 38 C (100.4 F)  dextrose Gel 1 Dose(s) Oral once PRN Blood Glucose LESS THAN 70 milliGRAM(s)/deciliter  glucagon  Injectable 1 milliGRAM(s) IntraMuscular once PRN Glucose LESS THAN 70 milligrams/deciliter      ALLERGIES:  Allergies    No Known Allergies    Intolerances        LABS:                        10.1   10.6  )-----------( 93       ( 2018 08:17 )             32.1     02-    137  |  99  |  22  ----------------------------<  166<H>  3.8   |  25  |  1.00    Ca    8.2<L>      2018 08:17    TPro  7.8  /  Alb  3.5  /  TBili  0.5  /  DBili  x   /  AST  43<H>  /  ALT  26  /  AlkPhos  69  02-    PT/INR - ( 2018 17:10 )   PT: 15.6 sec;   INR: 1.40          PTT - ( 2018 17:10 )  PTT:28.6 sec  Urinalysis Basic - ( 2018 17:21 )    Color: Yellow / Appearance: Cloudy / S.025 / pH: x  Gluc: x / Ketone: NEGATIVE  / Bili: Negative / Urobili: 0.2 E.U./dL   Blood: x / Protein: 30 mg/dL / Nitrite: NEGATIVE   Leuk Esterase: Moderate / RBC: < 5 /HPF / WBC Many /HPF   Sq Epi: x / Non Sq Epi: 5-10 /HPF / Bacteria: Many /HPF      CAPILLARY BLOOD GLUCOSE      POCT Blood Glucose.: 121 mg/dL (2018 22:25)      RADIOLOGY & ADDITIONAL TESTS: Reviewed. SUBJECTIVE / INTERVAL HPI: No major events overnight. Patient seen and examined at bedside. Complains of mild back pain, significantly improved from yesterday. Denies F/C/N/V/CP/SOB.    VITAL SIGNS:  Vital Signs Last 24 Hrs  T(C): 37.1 (2018 05:10), Max: 38.9 (2018 21:46)  T(F): 98.7 (2018 05:10), Max: 102 (2018 21:46)  HR: 92 (2018 05:10) (80 - 102)  BP: 191/73 (2018 05:10) (131/62 - 191/73)  BP(mean): --  RR: 18 (2018 05:10) (17 - 20)  SpO2: 97% (2018 05:10) (97% - 99%)    PHYSICAL EXAM:    General: Well developed; well nourished; in no acute distress  HEENT: MMM, conjunctiva and sclera clear  Lungs: clear  Heart: regular  Gastrointestinal: Soft, non-tender non-distended; Normal bowel sounds; No rebound or guarding  Extremities: Normal range of motion, No clubbing, cyanosis or edema  Back: Mild CVAT b/l. Improved per pt from yesterday.  Neurological: Alert and oriented x3  Skin: Warm and dry. No obvious rash      MEDICATIONS:  MEDICATIONS  (STANDING):  dextrose 5%. 1000 milliLiter(s) (50 mL/Hr) IV Continuous <Continuous>  dextrose 50% Injectable 12.5 Gram(s) IV Push once  dextrose 50% Injectable 25 Gram(s) IV Push once  dextrose 50% Injectable 25 Gram(s) IV Push once  insulin glargine Injectable (LANTUS) 10 Unit(s) SubCutaneous at bedtime  insulin lispro (HumaLOG) corrective regimen sliding scale   SubCutaneous Before meals and at bedtime  insulin lispro Injectable (HumaLOG) 5 Unit(s) SubCutaneous three times a day before meals  piperacillin/tazobactam IVPB. 4.5 Gram(s) IV Intermittent every 6 hours    MEDICATIONS  (PRN):  acetaminophen   Tablet 650 milliGRAM(s) Oral every 6 hours PRN For Temp greater than 38 C (100.4 F)  dextrose Gel 1 Dose(s) Oral once PRN Blood Glucose LESS THAN 70 milliGRAM(s)/deciliter  glucagon  Injectable 1 milliGRAM(s) IntraMuscular once PRN Glucose LESS THAN 70 milligrams/deciliter      ALLERGIES:  Allergies    No Known Allergies    Intolerances        LABS:                        10.1   10.6  )-----------( 93       ( 2018 08:17 )             32.1         137  |  99  |  22  ----------------------------<  166<H>  3.8   |  25  |  1.00    Ca    8.2<L>      2018 08:17    TPro  7.8  /  Alb  3.5  /  TBili  0.5  /  DBili  x   /  AST  43<H>  /  ALT  26  /  AlkPhos  69      PT/INR - ( 2018 17:10 )   PT: 15.6 sec;   INR: 1.40          PTT - ( 2018 17:10 )  PTT:28.6 sec  Urinalysis Basic - ( 2018 17:21 )    Color: Yellow / Appearance: Cloudy / S.025 / pH: x  Gluc: x / Ketone: NEGATIVE  / Bili: Negative / Urobili: 0.2 E.U./dL   Blood: x / Protein: 30 mg/dL / Nitrite: NEGATIVE   Leuk Esterase: Moderate / RBC: < 5 /HPF / WBC Many /HPF   Sq Epi: x / Non Sq Epi: 5-10 /HPF / Bacteria: Many /HPF      CAPILLARY BLOOD GLUCOSE      POCT Blood Glucose.: 121 mg/dL (2018 22:25)      RADIOLOGY & ADDITIONAL TESTS: Reviewed.

## 2018-02-25 NOTE — PROGRESS NOTE ADULT - PROBLEM SELECTOR PLAN 4
stable - Patient hypertensive, antihypertensives held in setting of sepsis.   - Restarting Lisinopril 40mg daily.

## 2018-02-25 NOTE — PROGRESS NOTE ADULT - SUBJECTIVE AND OBJECTIVE BOX
Pt seen and examined still with fever Tmax 102    REVIEW OF SYSTEMS:  Constitutional: + fever, no weight loss or fatigue ? chills  Cardiovascular: No chest pain, palpitations, dizziness or leg swelling  Gastrointestinal: No abdominal or epigastric pain. No nausea, vomiting or hematemesis; No diarrhea or constipation. No melena or hematochezia.  Skin: No itching, burning, rashes or lesions       MEDICATIONS:  MEDICATIONS  (STANDING):  dextrose 5%. 1000 milliLiter(s) (50 mL/Hr) IV Continuous <Continuous>  dextrose 50% Injectable 12.5 Gram(s) IV Push once  dextrose 50% Injectable 25 Gram(s) IV Push once  dextrose 50% Injectable 25 Gram(s) IV Push once  insulin glargine Injectable (LANTUS) 10 Unit(s) SubCutaneous at bedtime  insulin lispro (HumaLOG) corrective regimen sliding scale   SubCutaneous Before meals and at bedtime  insulin lispro Injectable (HumaLOG) 5 Unit(s) SubCutaneous three times a day before meals  lisinopril 40 milliGRAM(s) Oral daily  piperacillin/tazobactam IVPB. 4.5 Gram(s) IV Intermittent every 6 hours    MEDICATIONS  (PRN):  acetaminophen   Tablet 650 milliGRAM(s) Oral every 6 hours PRN For Temp greater than 38 C (100.4 F)  dextrose Gel 1 Dose(s) Oral once PRN Blood Glucose LESS THAN 70 milliGRAM(s)/deciliter  glucagon  Injectable 1 milliGRAM(s) IntraMuscular once PRN Glucose LESS THAN 70 milligrams/deciliter      Allergies    No Known Allergies    Intolerances        Vital Signs Last 24 Hrs  T(C): 37.4 (25 Feb 2018 15:28), Max: 38.9 (24 Feb 2018 21:46)  T(F): 99.3 (25 Feb 2018 15:28), Max: 102 (24 Feb 2018 21:46)  HR: 96 (25 Feb 2018 15:28) (92 - 102)  BP: 162/81 (25 Feb 2018 15:28) (145/72 - 191/73)  BP(mean): --  RR: 20 (25 Feb 2018 15:28) (17 - 20)  SpO2: 96% (25 Feb 2018 15:28) (96% - 99%)      PHYSICAL EXAM:    General: ; in no acute distress  HEENT: MMM, conjunctiva and sclera clear  Lungs: clear  Heart: regular  Gastrointestinal: Soft non-tender non-distended; Normal bowel sounds; No hepatosplenomegaly  Skin: Warm and dry. No obvious rash    LABS:      CBC Full  -  ( 25 Feb 2018 07:50 )  WBC Count : 7.1 K/uL  Hemoglobin : 10.4 g/dL  Hematocrit : 32.1 %  Platelet Count - Automated : 88 K/uL  Mean Cell Volume : 80.3 fL  Mean Cell Hemoglobin : 26.0 pg  Mean Cell Hemoglobin Concentration : 32.4 g/dL  Auto Neutrophil # : x  Auto Lymphocyte # : x  Auto Monocyte # : x  Auto Eosinophil # : x  Auto Basophil # : x  Auto Neutrophil % : x  Auto Lymphocyte % : x  Auto Monocyte % : x  Auto Eosinophil % : x  Auto Basophil % : x    02-25    138  |  98  |  12  ----------------------------<  109<H>  3.5   |  28  |  0.95    Ca    8.8      25 Feb 2018 07:50  Mg     1.6     02-25                        RADIOLOGY & ADDITIONAL STUDIES (The following images were personally reviewed):

## 2018-02-25 NOTE — CONSULT NOTE ADULT - PROBLEM SELECTOR RECOMMENDATION 9
1) Recurrent Klebsiella urosepsis, predisposing factor Diabetes and likely some urinary retention, no renal stone seen on CT  2) Agree to continue Zosyn 3.375gr IV q6h , pending sensitivity  3) Repeat L renal US

## 2018-02-26 LAB
-  AMPICILLIN/SULBACTAM: SIGNIFICANT CHANGE UP
-  AMPICILLIN/SULBACTAM: SIGNIFICANT CHANGE UP
-  AMPICILLIN: SIGNIFICANT CHANGE UP
-  AMPICILLIN: SIGNIFICANT CHANGE UP
-  CEFAZOLIN: SIGNIFICANT CHANGE UP
-  CEFAZOLIN: SIGNIFICANT CHANGE UP
-  CEFTRIAXONE: SIGNIFICANT CHANGE UP
-  CEFTRIAXONE: SIGNIFICANT CHANGE UP
-  CIPROFLOXACIN: SIGNIFICANT CHANGE UP
-  CIPROFLOXACIN: SIGNIFICANT CHANGE UP
-  GENTAMICIN: SIGNIFICANT CHANGE UP
-  GENTAMICIN: SIGNIFICANT CHANGE UP
-  PIPERACILLIN/TAZOBACTAM: SIGNIFICANT CHANGE UP
-  PIPERACILLIN/TAZOBACTAM: SIGNIFICANT CHANGE UP
-  TOBRAMYCIN: SIGNIFICANT CHANGE UP
-  TOBRAMYCIN: SIGNIFICANT CHANGE UP
-  TRIMETHOPRIM/SULFAMETHOXAZOLE: SIGNIFICANT CHANGE UP
-  TRIMETHOPRIM/SULFAMETHOXAZOLE: SIGNIFICANT CHANGE UP
ANION GAP SERPL CALC-SCNC: 11 MMOL/L — SIGNIFICANT CHANGE UP (ref 5–17)
BUN SERPL-MCNC: 9 MG/DL — SIGNIFICANT CHANGE UP (ref 7–23)
CALCIUM SERPL-MCNC: 8.6 MG/DL — SIGNIFICANT CHANGE UP (ref 8.4–10.5)
CHLORIDE SERPL-SCNC: 100 MMOL/L — SIGNIFICANT CHANGE UP (ref 96–108)
CO2 SERPL-SCNC: 28 MMOL/L — SIGNIFICANT CHANGE UP (ref 22–31)
CREAT SERPL-MCNC: 0.85 MG/DL — SIGNIFICANT CHANGE UP (ref 0.5–1.3)
CULTURE RESULTS: NO GROWTH — SIGNIFICANT CHANGE UP
CULTURE RESULTS: SIGNIFICANT CHANGE UP
CULTURE RESULTS: SIGNIFICANT CHANGE UP
GLUCOSE BLDC GLUCOMTR-MCNC: 111 MG/DL — HIGH (ref 70–99)
GLUCOSE BLDC GLUCOMTR-MCNC: 112 MG/DL — HIGH (ref 70–99)
GLUCOSE BLDC GLUCOMTR-MCNC: 126 MG/DL — HIGH (ref 70–99)
GLUCOSE BLDC GLUCOMTR-MCNC: 151 MG/DL — HIGH (ref 70–99)
GLUCOSE BLDC GLUCOMTR-MCNC: 78 MG/DL — SIGNIFICANT CHANGE UP (ref 70–99)
GLUCOSE SERPL-MCNC: 106 MG/DL — HIGH (ref 70–99)
HCT VFR BLD CALC: 29 % — LOW (ref 34.5–45)
HGB BLD-MCNC: 9.2 G/DL — LOW (ref 11.5–15.5)
MAGNESIUM SERPL-MCNC: 1.7 MG/DL — SIGNIFICANT CHANGE UP (ref 1.6–2.6)
MCHC RBC-ENTMCNC: 25.3 PG — LOW (ref 27–34)
MCHC RBC-ENTMCNC: 31.7 G/DL — LOW (ref 32–36)
MCV RBC AUTO: 79.9 FL — LOW (ref 80–100)
METHOD TYPE: SIGNIFICANT CHANGE UP
METHOD TYPE: SIGNIFICANT CHANGE UP
ORGANISM # SPEC MICROSCOPIC CNT: SIGNIFICANT CHANGE UP
PLATELET # BLD AUTO: 102 K/UL — LOW (ref 150–400)
POTASSIUM SERPL-MCNC: 3.4 MMOL/L — LOW (ref 3.5–5.3)
POTASSIUM SERPL-SCNC: 3.4 MMOL/L — LOW (ref 3.5–5.3)
RBC # BLD: 3.63 M/UL — LOW (ref 3.8–5.2)
RBC # FLD: 14.2 % — SIGNIFICANT CHANGE UP (ref 10.3–16.9)
SODIUM SERPL-SCNC: 139 MMOL/L — SIGNIFICANT CHANGE UP (ref 135–145)
SPECIMEN SOURCE: SIGNIFICANT CHANGE UP
WBC # BLD: 4.9 K/UL — SIGNIFICANT CHANGE UP (ref 3.8–10.5)
WBC # FLD AUTO: 4.9 K/UL — SIGNIFICANT CHANGE UP (ref 3.8–10.5)

## 2018-02-26 RX ORDER — POTASSIUM CHLORIDE 20 MEQ
40 PACKET (EA) ORAL ONCE
Qty: 0 | Refills: 0 | Status: COMPLETED | OUTPATIENT
Start: 2018-02-26 | End: 2018-02-26

## 2018-02-26 RX ORDER — CEFTRIAXONE 500 MG/1
1 INJECTION, POWDER, FOR SOLUTION INTRAMUSCULAR; INTRAVENOUS EVERY 24 HOURS
Qty: 0 | Refills: 0 | Status: DISCONTINUED | OUTPATIENT
Start: 2018-02-26 | End: 2018-02-28

## 2018-02-26 RX ORDER — ACETAMINOPHEN 500 MG
1000 TABLET ORAL ONCE
Qty: 0 | Refills: 0 | Status: COMPLETED | OUTPATIENT
Start: 2018-02-26 | End: 2018-02-26

## 2018-02-26 RX ORDER — ACETAMINOPHEN WITH CODEINE 300MG-30MG
1 TABLET ORAL EVERY 4 HOURS
Qty: 0 | Refills: 0 | Status: DISCONTINUED | OUTPATIENT
Start: 2018-02-26 | End: 2018-02-26

## 2018-02-26 RX ORDER — POTASSIUM CHLORIDE 20 MEQ
20 PACKET (EA) ORAL ONCE
Qty: 0 | Refills: 0 | Status: COMPLETED | OUTPATIENT
Start: 2018-02-26 | End: 2018-02-26

## 2018-02-26 RX ORDER — MAGNESIUM OXIDE 400 MG ORAL TABLET 241.3 MG
800 TABLET ORAL ONCE
Qty: 0 | Refills: 0 | Status: COMPLETED | OUTPATIENT
Start: 2018-02-26 | End: 2018-02-26

## 2018-02-26 RX ADMIN — PIPERACILLIN AND TAZOBACTAM 200 GRAM(S): 4; .5 INJECTION, POWDER, LYOPHILIZED, FOR SOLUTION INTRAVENOUS at 02:15

## 2018-02-26 RX ADMIN — Medication 5 UNIT(S): at 08:56

## 2018-02-26 RX ADMIN — PIPERACILLIN AND TAZOBACTAM 200 GRAM(S): 4; .5 INJECTION, POWDER, LYOPHILIZED, FOR SOLUTION INTRAVENOUS at 14:30

## 2018-02-26 RX ADMIN — LISINOPRIL 40 MILLIGRAM(S): 2.5 TABLET ORAL at 05:30

## 2018-02-26 RX ADMIN — Medication 400 MILLIGRAM(S): at 17:25

## 2018-02-26 RX ADMIN — MAGNESIUM OXIDE 400 MG ORAL TABLET 800 MILLIGRAM(S): 241.3 TABLET ORAL at 09:51

## 2018-02-26 RX ADMIN — Medication 40 MILLIEQUIVALENT(S): at 09:52

## 2018-02-26 RX ADMIN — Medication 5 UNIT(S): at 18:12

## 2018-02-26 RX ADMIN — PIPERACILLIN AND TAZOBACTAM 200 GRAM(S): 4; .5 INJECTION, POWDER, LYOPHILIZED, FOR SOLUTION INTRAVENOUS at 09:51

## 2018-02-26 RX ADMIN — Medication 20 MILLIEQUIVALENT(S): at 12:59

## 2018-02-26 RX ADMIN — Medication 5 UNIT(S): at 12:59

## 2018-02-26 RX ADMIN — CEFTRIAXONE 100 GRAM(S): 500 INJECTION, POWDER, FOR SOLUTION INTRAMUSCULAR; INTRAVENOUS at 18:12

## 2018-02-26 RX ADMIN — Medication 650 MILLIGRAM(S): at 02:58

## 2018-02-26 RX ADMIN — Medication 1000 MILLIGRAM(S): at 18:00

## 2018-02-26 NOTE — PROGRESS NOTE ADULT - PROBLEM SELECTOR PLAN 4
- Patient hypertensive, antihypertensives held in setting of sepsis.   - Restarting Lisinopril 40mg daily.

## 2018-02-26 NOTE — PROGRESS NOTE ADULT - ASSESSMENT
dr. Almaraz to see for ID. await cultures. continue antibiotics 72F w/ pyelonephritis and GNR bacteremia responding well to antibiotic therapy.

## 2018-02-26 NOTE — PROGRESS NOTE ADULT - SUBJECTIVE AND OBJECTIVE BOX
Pt seen and examined c/o headaches  still spiking but less    REVIEW OF SYSTEMS:  Constitutional: No fever, weight loss or fatigue  Cardiovascular: No chest pain, palpitations, dizziness or leg swelling  Gastrointestinal: No abdominal or epigastric pain. No nausea, vomiting or hematemesis; No diarrhea or constipation. No melena or hematochezia.  Skin: No itching, burning, rashes or lesions       MEDICATIONS:  MEDICATIONS  (STANDING):  dextrose 5%. 1000 milliLiter(s) (50 mL/Hr) IV Continuous <Continuous>  dextrose 50% Injectable 12.5 Gram(s) IV Push once  dextrose 50% Injectable 25 Gram(s) IV Push once  dextrose 50% Injectable 25 Gram(s) IV Push once  insulin glargine Injectable (LANTUS) 10 Unit(s) SubCutaneous at bedtime  insulin lispro (HumaLOG) corrective regimen sliding scale   SubCutaneous Before meals and at bedtime  insulin lispro Injectable (HumaLOG) 5 Unit(s) SubCutaneous three times a day before meals  lisinopril 40 milliGRAM(s) Oral daily  piperacillin/tazobactam IVPB. 4.5 Gram(s) IV Intermittent every 6 hours    MEDICATIONS  (PRN):  acetaminophen   Tablet 650 milliGRAM(s) Oral every 6 hours PRN For Temp greater than 38 C (100.4 F)  dextrose Gel 1 Dose(s) Oral once PRN Blood Glucose LESS THAN 70 milliGRAM(s)/deciliter  glucagon  Injectable 1 milliGRAM(s) IntraMuscular once PRN Glucose LESS THAN 70 milligrams/deciliter      Allergies    No Known Allergies    Intolerances        Vital Signs Last 24 Hrs  T(C): 37.3 (26 Feb 2018 09:58), Max: 38.6 (25 Feb 2018 21:01)  T(F): 99.1 (26 Feb 2018 09:58), Max: 101.4 (25 Feb 2018 21:01)  HR: 95 (26 Feb 2018 09:58) (71 - 103)  BP: 185/76 (26 Feb 2018 09:58) (136/71 - 185/76)  BP(mean): --  RR: 17 (26 Feb 2018 09:58) (17 - 20)  SpO2: 98% (26 Feb 2018 09:58) (96% - 99%)      PHYSICAL EXAM:    General: Well developed; well nourished; in no acute distress  HEENT: MMM, conjunctiva and sclera clear  Lungs: clear  heart: regular  Gastrointestinal: Soft non-tender non-distended; Normal bowel sounds; No hepatosplenomegaly  Skin: Warm and dry. No obvious rash    LABS:      CBC Full  -  ( 26 Feb 2018 06:42 )  WBC Count : 4.9 K/uL  Hemoglobin : 9.2 g/dL  Hematocrit : 29.0 %  Platelet Count - Automated : 102 K/uL  Mean Cell Volume : 79.9 fL  Mean Cell Hemoglobin : 25.3 pg  Mean Cell Hemoglobin Concentration : 31.7 g/dL  Auto Neutrophil # : x  Auto Lymphocyte # : x  Auto Monocyte # : x  Auto Eosinophil # : x  Auto Basophil # : x  Auto Neutrophil % : x  Auto Lymphocyte % : x  Auto Monocyte % : x  Auto Eosinophil % : x  Auto Basophil % : x    02-26    139  |  100  |  9   ----------------------------<  106<H>  3.4<L>   |  28  |  0.85    Ca    8.6      26 Feb 2018 06:42  Mg     1.7     02-26                        RADIOLOGY & ADDITIONAL STUDIES (The following images were personally reviewed):

## 2018-02-26 NOTE — PROGRESS NOTE ADULT - PROBLEM SELECTOR PLAN 1
1) Recurrent Klebsiella urosepsis, predisposing factor Diabetes and likely some urinary retention, no renal stone seen on CT  2) d/c Zosyn 3.375gr and switch to IV Ceftriaxone, pending fever resolution  3) Repeat L renal US.

## 2018-02-26 NOTE — PROGRESS NOTE ADULT - SUBJECTIVE AND OBJECTIVE BOX
SUBJECTIVE / INTERVAL HPI: Tm 101.4. Tylenol given. Curve still downtrending. Cultures unable to be drawn. Patient seen and examined at bedside.     VITAL SIGNS:  Vital Signs Last 24 Hrs  T(C): 36.5 (26 Feb 2018 05:11), Max: 38.6 (25 Feb 2018 21:01)  T(F): 97.7 (26 Feb 2018 05:11), Max: 101.4 (25 Feb 2018 21:01)  HR: 71 (26 Feb 2018 05:11) (71 - 103)  BP: 136/71 (26 Feb 2018 05:11) (136/71 - 170/85)  BP(mean): --  RR: 17 (26 Feb 2018 05:11) (17 - 20)  SpO2: 97% (26 Feb 2018 05:11) (96% - 99%)    PHYSICAL EXAM:    General: Well developed; well nourished; in no acute distress  HEENT: MMM, conjunctiva and sclera clear  Lungs: clear  Heart: regular  Gastrointestinal: Soft, non-tender non-distended; Normal bowel sounds; No rebound or guarding  Extremities: Normal range of motion, No clubbing, cyanosis or edema  Back: Mild CVAT b/l. Improved per pt from yesterday.  Neurological: Alert and oriented x3  Skin: Warm and dry. No obvious rash      MEDICATIONS:  MEDICATIONS  (STANDING):  dextrose 5%. 1000 milliLiter(s) (50 mL/Hr) IV Continuous <Continuous>  dextrose 50% Injectable 12.5 Gram(s) IV Push once  dextrose 50% Injectable 25 Gram(s) IV Push once  dextrose 50% Injectable 25 Gram(s) IV Push once  insulin glargine Injectable (LANTUS) 10 Unit(s) SubCutaneous at bedtime  insulin lispro (HumaLOG) corrective regimen sliding scale   SubCutaneous Before meals and at bedtime  insulin lispro Injectable (HumaLOG) 5 Unit(s) SubCutaneous three times a day before meals  lisinopril 40 milliGRAM(s) Oral daily  piperacillin/tazobactam IVPB. 4.5 Gram(s) IV Intermittent every 6 hours    MEDICATIONS  (PRN):  acetaminophen   Tablet 650 milliGRAM(s) Oral every 6 hours PRN For Temp greater than 38 C (100.4 F)  dextrose Gel 1 Dose(s) Oral once PRN Blood Glucose LESS THAN 70 milliGRAM(s)/deciliter  glucagon  Injectable 1 milliGRAM(s) IntraMuscular once PRN Glucose LESS THAN 70 milligrams/deciliter      ALLERGIES:  Allergies    No Known Allergies    Intolerances        LABS:                        9.2    4.9   )-----------( 102      ( 26 Feb 2018 06:42 )             29.0     02-26    139  |  100  |  9   ----------------------------<  106<H>  3.4<L>   |  28  |  0.85    Ca    8.6      26 Feb 2018 06:42  Mg     1.7     02-26          CAPILLARY BLOOD GLUCOSE      POCT Blood Glucose.: 140 mg/dL (25 Feb 2018 22:11)      RADIOLOGY & ADDITIONAL TESTS: Reviewed. SUBJECTIVE / INTERVAL HPI: Tm 101.4. Tylenol given. Curve still downtrending. Cultures unable to be drawn. Patient seen and examined at bedside. No complaints this morning.    VITAL SIGNS:  Vital Signs Last 24 Hrs  T(C): 36.5 (26 Feb 2018 05:11), Max: 38.6 (25 Feb 2018 21:01)  T(F): 97.7 (26 Feb 2018 05:11), Max: 101.4 (25 Feb 2018 21:01)  HR: 71 (26 Feb 2018 05:11) (71 - 103)  BP: 136/71 (26 Feb 2018 05:11) (136/71 - 170/85)  BP(mean): --  RR: 17 (26 Feb 2018 05:11) (17 - 20)  SpO2: 97% (26 Feb 2018 05:11) (96% - 99%)    PHYSICAL EXAM:    General: Well developed; well nourished; in no acute distress  HEENT: MMM, conjunctiva and sclera clear  Lungs: clear  Heart: regular  Gastrointestinal: Soft, non-tender non-distended; Normal bowel sounds; No rebound or guarding  Extremities: Normal range of motion, No clubbing, cyanosis or edema  Back: Mild CVAT b/l. Improved per pt from yesterday.  Neurological: Alert and oriented x3  Skin: Warm and dry. No obvious rash      MEDICATIONS:  MEDICATIONS  (STANDING):  dextrose 5%. 1000 milliLiter(s) (50 mL/Hr) IV Continuous <Continuous>  dextrose 50% Injectable 12.5 Gram(s) IV Push once  dextrose 50% Injectable 25 Gram(s) IV Push once  dextrose 50% Injectable 25 Gram(s) IV Push once  insulin glargine Injectable (LANTUS) 10 Unit(s) SubCutaneous at bedtime  insulin lispro (HumaLOG) corrective regimen sliding scale   SubCutaneous Before meals and at bedtime  insulin lispro Injectable (HumaLOG) 5 Unit(s) SubCutaneous three times a day before meals  lisinopril 40 milliGRAM(s) Oral daily  piperacillin/tazobactam IVPB. 4.5 Gram(s) IV Intermittent every 6 hours    MEDICATIONS  (PRN):  acetaminophen   Tablet 650 milliGRAM(s) Oral every 6 hours PRN For Temp greater than 38 C (100.4 F)  dextrose Gel 1 Dose(s) Oral once PRN Blood Glucose LESS THAN 70 milliGRAM(s)/deciliter  glucagon  Injectable 1 milliGRAM(s) IntraMuscular once PRN Glucose LESS THAN 70 milligrams/deciliter      ALLERGIES:  Allergies    No Known Allergies    Intolerances        LABS:                        9.2    4.9   )-----------( 102      ( 26 Feb 2018 06:42 )             29.0     02-26    139  |  100  |  9   ----------------------------<  106<H>  3.4<L>   |  28  |  0.85    Ca    8.6      26 Feb 2018 06:42  Mg     1.7     02-26          CAPILLARY BLOOD GLUCOSE      POCT Blood Glucose.: 140 mg/dL (25 Feb 2018 22:11)      RADIOLOGY & ADDITIONAL TESTS: Reviewed.

## 2018-02-26 NOTE — PROGRESS NOTE ADULT - SUBJECTIVE AND OBJECTIVE BOX
INTERVAL HPI/OVERNIGHT EVENTS: Still spiking fever, feels better    CONSTITUTIONAL:  Negative fever or chills, feels well, good appetite  EYES:  Negative  blurry vision or double vision  CARDIOVASCULAR:  Negative for chest pain or palpitations  RESPIRATORY:  Negative for cough, wheezing, or SOB   GASTROINTESTINAL:  Negative for nausea, vomiting, diarrhea, constipation, or abdominal pain  GENITOURINARY:  Negative frequency, urgency or dysuria  NEUROLOGIC:  No headache, confusion, dizziness, lightheadedness      ANTIBIOTICS/RELEVANT:    MEDICATIONS  (STANDING):  cefTRIAXone   IVPB 1 Gram(s) IV Intermittent every 24 hours  dextrose 5%. 1000 milliLiter(s) (50 mL/Hr) IV Continuous <Continuous>  dextrose 50% Injectable 12.5 Gram(s) IV Push once  dextrose 50% Injectable 25 Gram(s) IV Push once  dextrose 50% Injectable 25 Gram(s) IV Push once  insulin glargine Injectable (LANTUS) 10 Unit(s) SubCutaneous at bedtime  insulin lispro (HumaLOG) corrective regimen sliding scale   SubCutaneous Before meals and at bedtime  insulin lispro Injectable (HumaLOG) 5 Unit(s) SubCutaneous three times a day before meals  lisinopril 40 milliGRAM(s) Oral daily    MEDICATIONS  (PRN):  acetaminophen   Tablet 650 milliGRAM(s) Oral every 6 hours PRN For Temp greater than 38 C (100.4 F)  dextrose Gel 1 Dose(s) Oral once PRN Blood Glucose LESS THAN 70 milliGRAM(s)/deciliter  glucagon  Injectable 1 milliGRAM(s) IntraMuscular once PRN Glucose LESS THAN 70 milligrams/deciliter        Vital Signs Last 24 Hrs  T(C): 38.7 (26 Feb 2018 15:34), Max: 38.7 (26 Feb 2018 15:34)  T(F): 101.7 (26 Feb 2018 15:34), Max: 101.7 (26 Feb 2018 15:34)  HR: 94 (26 Feb 2018 15:34) (71 - 103)  BP: 183/78 (26 Feb 2018 15:34) (136/71 - 185/76)  BP(mean): --  RR: 20 (26 Feb 2018 15:34) (17 - 20)  SpO2: 96% (26 Feb 2018 15:34) (96% - 99%)    PHYSICAL EXAM:  Constitutional:Well-developed, well nourished  Eyes:FANI, EOMI  Ear/Nose/Throat: no oral lesion, no sinus tenderness on percussion	  Neck:no JVD, no lymphadenopathy, supple  Respiratory: CTA ruy  Cardiovascular: S1S2 RRR, no murmurs  Gastrointestinal:soft, (+) BS, no HSM, (+)L flank tenderness  Extremities:no e/e/c  Vascular: DP Pulse: right normal; left normal      LABS:                        9.2    4.9   )-----------( 102      ( 26 Feb 2018 06:42 )             29.0     02-26    139  |  100  |  9   ----------------------------<  106<H>  3.4<L>   |  28  |  0.85    Ca    8.6      26 Feb 2018 06:42  Mg     1.7     02-26    MICROBIOLOGY:  Culture - Urine (02.25.18 @ 07:51)    Specimen Source: .Urine Clean Catch (Midstream)    Culture Results:   No growth    Culture - Blood (02.23.18 @ 22:38)    -  Klebsiella pneumoniae: Detec    -  Tobramycin: S <=4    -  Trimethoprim/Sulfamethoxazole: S <=2/38    Gram Stain:   Anaerobic Bottle: Gram Negative Rods  Result called to and read back by_ DEON Vázquez RN  02/24/2018 09:55:51  Aerobic Bottle: Gram Negative Rods  Result called to and read back byNiru Vázquez RN  02/24/2018 10:17:23  "Due to technical problems, Proteus sp. will Not be reported as part of  the BC[facility] panel until further notice"  ***Blood Panel PCR results on this specimen are available  approximately 3 hours after the Gram stain result.***  Gram stain, PCR, and/or culture results may not always  correspond due to difference in methodologies.  ************************************************************  This PCR assay was performed using [employer].  The following targets are tested for: Enterococcus,  vancomycin resistant enterococci, Listeria monocytogenes,  coagulase negative staphylococci, S. aureus,  methicillin resistant S. aureus, Streptococcus agalactiae  (Group B), S. pneumoniae, S. pyogenes (Group A),  Acinetobacter baumannii, Enterobacter cloacae, E. coli,  Klebsiella oxytoca, K. pneumoniae, Proteus sp.,  Serratia marcescens, Haemophilus influenzae,  Neisseria meningitidis, Pseudomonas aeruginosa, Candida  albicans, C. glabrata, C krusei, C parapsilosis,  C. tropicalis and the KPC resistance gene.    -  Ampicillin: R >16    -  Ampicillin/Sulbactam: S <=8/4    -  Cefazolin: S <=8    -  Ceftriaxone: S <=1    -  Ciprofloxacin: S <=1    -  Gentamicin: S <=4    -  Piperacillin/Tazobactam: S <=16    Specimen Source: .Blood Blood    Organism: Blood Culture PCR    Organism: Klebsiella pneumoniae    Culture Results:   Growth in aerobic and anaerobic bottles: Klebsiella pneumoniae    Organism Identification: Blood Culture PCR  Klebsiella pneumoniae    Method Type: PCR    Method Type: ANTHONY        RADIOLOGY & ADDITIONAL STUDIES:

## 2018-02-27 ENCOUNTER — TRANSCRIPTION ENCOUNTER (OUTPATIENT)
Age: 73
End: 2018-02-27

## 2018-02-27 LAB
GLUCOSE BLDC GLUCOMTR-MCNC: 143 MG/DL — HIGH (ref 70–99)
GLUCOSE BLDC GLUCOMTR-MCNC: 190 MG/DL — HIGH (ref 70–99)
GLUCOSE BLDC GLUCOMTR-MCNC: 231 MG/DL — HIGH (ref 70–99)
GLUCOSE BLDC GLUCOMTR-MCNC: 271 MG/DL — HIGH (ref 70–99)
GLUCOSE BLDC GLUCOMTR-MCNC: 65 MG/DL — LOW (ref 70–99)

## 2018-02-27 RX ORDER — AMLODIPINE BESYLATE 2.5 MG/1
5 TABLET ORAL DAILY
Qty: 0 | Refills: 0 | Status: DISCONTINUED | OUTPATIENT
Start: 2018-02-28 | End: 2018-02-28

## 2018-02-27 RX ORDER — AMLODIPINE BESYLATE 2.5 MG/1
5 TABLET ORAL ONCE
Qty: 0 | Refills: 0 | Status: COMPLETED | OUTPATIENT
Start: 2018-02-27 | End: 2018-02-27

## 2018-02-27 RX ADMIN — Medication 650 MILLIGRAM(S): at 05:21

## 2018-02-27 RX ADMIN — Medication 6: at 13:12

## 2018-02-27 RX ADMIN — INSULIN GLARGINE 10 UNIT(S): 100 INJECTION, SOLUTION SUBCUTANEOUS at 22:44

## 2018-02-27 RX ADMIN — Medication 5 UNIT(S): at 08:00

## 2018-02-27 RX ADMIN — CEFTRIAXONE 100 GRAM(S): 500 INJECTION, POWDER, FOR SOLUTION INTRAMUSCULAR; INTRAVENOUS at 16:39

## 2018-02-27 RX ADMIN — AMLODIPINE BESYLATE 5 MILLIGRAM(S): 2.5 TABLET ORAL at 16:39

## 2018-02-27 RX ADMIN — Medication 5 UNIT(S): at 13:13

## 2018-02-27 RX ADMIN — Medication 1 CAPSULE(S): at 16:40

## 2018-02-27 RX ADMIN — Medication 5 UNIT(S): at 18:40

## 2018-02-27 RX ADMIN — Medication 2: at 22:00

## 2018-02-27 RX ADMIN — LISINOPRIL 40 MILLIGRAM(S): 2.5 TABLET ORAL at 05:21

## 2018-02-27 NOTE — PROGRESS NOTE ADULT - PROBLEM SELECTOR PLAN 1
nd likely some urinary retention, no renal stone seen on CT; it is expected fever for 3-4 days with pyelonephritis  2) d/c Zosyn 3.375gr and switch to IV Ceftriaxone, pending fever resolution  3) Repeat L renal US.   4) When patient afebrile 24-48h and repeated blood culture negative switch to oral Ciprofloxacin 500mg q12h x 14 days

## 2018-02-27 NOTE — DISCHARGE NOTE ADULT - CARE PROVIDERS DIRECT ADDRESSES
[email or web address]

## 2018-02-27 NOTE — DISCHARGE NOTE ADULT - ADDITIONAL INSTRUCTIONS
Please follow up with Dr. Diaz within 2 weeks of discharge from the hospital for continued care. His office number is listed below.

## 2018-02-27 NOTE — PROGRESS NOTE ADULT - SUBJECTIVE AND OBJECTIVE BOX
INTERVAL HPI/OVERNIGHT EVENTS: Still spiking fever    CONSTITUTIONAL:  (+) fever or chills,  EYES:  Negative  blurry vision or double vision  CARDIOVASCULAR:  Negative for chest pain or palpitations  RESPIRATORY:  Negative for cough, wheezing, or SOB   GASTROINTESTINAL:  Negative for nausea, vomiting, diarrhea, constipation,(+) Flank pain  GENITOURINARY:  Negative frequency, urgency or dysuria  NEUROLOGIC:  No headache, confusion, dizziness, lightheadedness      ANTIBIOTICS/RELEVANT:    MEDICATIONS  (STANDING):  cefTRIAXone   IVPB 1 Gram(s) IV Intermittent every 24 hours  dextrose 5%. 1000 milliLiter(s) (50 mL/Hr) IV Continuous <Continuous>  dextrose 50% Injectable 12.5 Gram(s) IV Push once  dextrose 50% Injectable 25 Gram(s) IV Push once  dextrose 50% Injectable 25 Gram(s) IV Push once  insulin glargine Injectable (LANTUS) 10 Unit(s) SubCutaneous at bedtime  insulin lispro (HumaLOG) corrective regimen sliding scale   SubCutaneous Before meals and at bedtime  insulin lispro Injectable (HumaLOG) 5 Unit(s) SubCutaneous three times a day before meals  lisinopril 40 milliGRAM(s) Oral daily    MEDICATIONS  (PRN):  acetaminophen   Tablet 650 milliGRAM(s) Oral every 6 hours PRN For Temp greater than 38 C (100.4 F)  dextrose Gel 1 Dose(s) Oral once PRN Blood Glucose LESS THAN 70 milliGRAM(s)/deciliter  glucagon  Injectable 1 milliGRAM(s) IntraMuscular once PRN Glucose LESS THAN 70 milligrams/deciliter        Vital Signs Last 24 Hrs  T(C): 36.7 (27 Feb 2018 06:44), Max: 38.8 (27 Feb 2018 05:14)  T(F): 98.1 (27 Feb 2018 06:44), Max: 101.8 (27 Feb 2018 05:14)  HR: 90 (27 Feb 2018 06:44) (69 - 113)  BP: 155/69 (27 Feb 2018 06:44) (150/73 - 183/78)  BP(mean): --  RR: 20 (27 Feb 2018 06:44) (18 - 20)  SpO2: 98% (27 Feb 2018 06:44) (96% - 100%)    PHYSICAL EXAM:  Constitutional:Well-developed, well nourished  Eyes:FANI, EOMI  Ear/Nose/Throat: no oral lesion, no sinus tenderness on percussion	  Neck:no JVD, no lymphadenopathy, supple  Respiratory: CTA ruy  Cardiovascular: S1S2 RRR, no murmurs  Gastrointestinal:soft, (+) BS, no HSM, (+) L flank pain  Extremities:no e/e/c  Vascular: DP Pulse: right normal; left normal      LABS:                        9.2    4.9   )-----------( 102      ( 26 Feb 2018 06:42 )             29.0     02-26    139  |  100  |  9   ----------------------------<  106<H>  3.4<L>   |  28  |  0.85    Ca    8.6      26 Feb 2018 06:42  Mg     1.7     02-26        MICROBIOLOGY:  Culture - Blood (02.26.18 @ 16:57)    Specimen Source: .Blood Blood-Arterial    Culture Results:   No growth at 12 hours    Culture - Blood (02.23.18 @ 22:38)    -  Klebsiella pneumoniae: Detec    -  Piperacillin/Tazobactam: S <=16    -  Tobramycin: S <=4    -  Trimethoprim/Sulfamethoxazole: S <=2/38    Gram Stain:   Anaerobic Bottle: Gram Negative Rods  Result called to and read back by_ DEON Vázquez RN  02/24/2018 09:55:51  Aerobic Bottle: Gram Negative Rods  Result called to and read back byNiru Vázquez RN  02/24/2018 10:17:23  "Due to technical problems, Proteus sp. will Not be reported as part of  the BCID panel until further notice"  ***Blood Panel PCR results on this specimen are available  approximately 3 hours after the Gram stain result.***  Gram stain, PCR, and/or culture results may not always  correspond due to difference in methodologies.  ************************************************************  This PCR assay was performed using [employer].  The following targets are tested for: Enterococcus,  vancomycin resistant enterococci, Listeria monocytogenes,  coagulase negative staphylococci, S. aureus,  methicillin resistant S. aureus, Streptococcus agalactiae  (Group B), S. pneumoniae, S. pyogenes (Group A),  Acinetobacter baumannii, Enterobacter cloacae, E. coli,  Klebsiella oxytoca, K. pneumoniae, Proteus sp.,  Serratia marcescens, Haemophilus influenzae,  Neisseria meningitidis, Pseudomonas aeruginosa, Candida  albicans, C. glabrata, C krusei, C parapsilosis,  C. tropicalis and the KPC resistance gene.    -  Ampicillin: R >16    -  Ampicillin/Sulbactam: S <=8/4    -  Cefazolin: S <=8    -  Ceftriaxone: S <=1    -  Ciprofloxacin: S <=1    -  Gentamicin: S <=4    Specimen Source: .Blood Blood    Organism: Blood Culture PCR    Organism: Klebsiella pneumoniae    Culture Results:   Growth in aerobic and anaerobic bottles: Klebsiella pneumoniae    Organism Identification: Blood Culture PCR  Klebsiella pneumoniae    Method Type: PCR    Method Type: ANTHONY    RADIOLOGY & ADDITIONAL STUDIES:

## 2018-02-27 NOTE — PHYSICAL THERAPY INITIAL EVALUATION ADULT - ADDITIONAL COMMENTS
Pt lives alone in elevator access apt building. States that she has a HHA 3 days/wk x5hrs. Uses a SC and RW for ambulation but requesting new RW. Family lives close by. Denies hx of falls w/in past 6 mo

## 2018-02-27 NOTE — PROGRESS NOTE ADULT - SUBJECTIVE AND OBJECTIVE BOX
Pt seen and examined no more back pain but still with fevers, headaches persists    REVIEW OF SYSTEMS:  Constitutional: + fever  Cardiovascular: No chest pain, palpitations, dizziness or leg swelling  Gastrointestinal: No abdominal or epigastric pain. No nausea, vomiting or hematemesis; No diarrhea or constipation. No melena or hematochezia.  Skin: No itching, burning, rashes or lesions       MEDICATIONS:  MEDICATIONS  (STANDING):  cefTRIAXone   IVPB 1 Gram(s) IV Intermittent every 24 hours  dextrose 5%. 1000 milliLiter(s) (50 mL/Hr) IV Continuous <Continuous>  dextrose 50% Injectable 12.5 Gram(s) IV Push once  dextrose 50% Injectable 25 Gram(s) IV Push once  dextrose 50% Injectable 25 Gram(s) IV Push once  insulin glargine Injectable (LANTUS) 10 Unit(s) SubCutaneous at bedtime  insulin lispro (HumaLOG) corrective regimen sliding scale   SubCutaneous Before meals and at bedtime  insulin lispro Injectable (HumaLOG) 5 Unit(s) SubCutaneous three times a day before meals  lisinopril 40 milliGRAM(s) Oral daily    MEDICATIONS  (PRN):  acetaminophen   Tablet 650 milliGRAM(s) Oral every 6 hours PRN For Temp greater than 38 C (100.4 F)  dextrose Gel 1 Dose(s) Oral once PRN Blood Glucose LESS THAN 70 milliGRAM(s)/deciliter  glucagon  Injectable 1 milliGRAM(s) IntraMuscular once PRN Glucose LESS THAN 70 milligrams/deciliter      Allergies    No Known Allergies    Intolerances        Vital Signs Last 24 Hrs  T(C): 36.7 (27 Feb 2018 06:44), Max: 38.8 (27 Feb 2018 05:14)  T(F): 98.1 (27 Feb 2018 06:44), Max: 101.8 (27 Feb 2018 05:14)  HR: 90 (27 Feb 2018 06:44) (69 - 113)  BP: 155/69 (27 Feb 2018 06:44) (150/73 - 183/78)  BP(mean): --  RR: 20 (27 Feb 2018 06:44) (18 - 20)  SpO2: 98% (27 Feb 2018 06:44) (96% - 100%)      PHYSICAL EXAM:    General: Well developed; well nourished; in no acute distress  HEENT: MMM, conjunctiva and sclera clear  Gastrointestinal: Soft non-tender non-distended; Normal bowel sounds; No hepatosplenomegaly  Skin: Warm and dry. No obvious rash    LABS:      CBC Full  -  ( 26 Feb 2018 06:42 )  WBC Count : 4.9 K/uL  Hemoglobin : 9.2 g/dL  Hematocrit : 29.0 %  Platelet Count - Automated : 102 K/uL  Mean Cell Volume : 79.9 fL  Mean Cell Hemoglobin : 25.3 pg  Mean Cell Hemoglobin Concentration : 31.7 g/dL  Auto Neutrophil # : x  Auto Lymphocyte # : x  Auto Monocyte # : x  Auto Eosinophil # : x  Auto Basophil # : x  Auto Neutrophil % : x  Auto Lymphocyte % : x  Auto Monocyte % : x  Auto Eosinophil % : x  Auto Basophil % : x    02-26    139  |  100  |  9   ----------------------------<  106<H>  3.4<L>   |  28  |  0.85    Ca    8.6      26 Feb 2018 06:42  Mg     1.7     02-26                        RADIOLOGY & ADDITIONAL STUDIES (The following images were personally reviewed):

## 2018-02-27 NOTE — DISCHARGE NOTE ADULT - MEDICATION SUMMARY - MEDICATIONS TO TAKE
I will START or STAY ON the medications listed below when I get home from the hospital:    enalapril  -- 40 milligram(s) by mouth once a day  -- Indication: For HTN (hypertension)    glipiZIDE 10 mg oral tablet  -- 1 tab(s) by mouth once a day  -- Indication: For Diabetes    metFORMIN 1000 mg oral tablet  -- 1 tab(s) by mouth 2 times a day  -- Indication: For Diabetes    NovoLOG 100 units/mL subcutaneous solution  -- 5 unit(s) subcutaneous 3 times a day (before meals)  -- Indication: For Diabetes    Lantus 100 units/mL subcutaneous solution  -- 10 unit(s) subcutaneous once a day (at bedtime)  -- Indication: For Diabetes    lovastatin 40 mg oral tablet  -- 1 tab(s) by mouth once a day  -- Indication: For HLD    hydroCHLOROthiazide 25 mg oral tablet  -- 1 tab(s) by mouth once a day  -- Indication: For HTN (hypertension)    Betoptic S 0.25% ophthalmic suspension  -- 1 drop(s) to each affected eye 2 times a day  -- Indication: For Glaucoma    omeprazole 20 mg oral delayed release tablet  -- 1 tab(s) by mouth once a day  -- Indication: For GERD I will START or STAY ON the medications listed below when I get home from the hospital:    enalapril  -- 40 milligram(s) by mouth once a day  -- Indication: For HTN (hypertension)    glipiZIDE 10 mg oral tablet  -- 1 tab(s) by mouth once a day  -- Indication: For Diabetes    metFORMIN 1000 mg oral tablet  -- 1 tab(s) by mouth 2 times a day  -- Indication: For Diabetes    NovoLOG 100 units/mL subcutaneous solution  -- 5 unit(s) subcutaneous 3 times a day (before meals)  -- Indication: For Diabetes    Lantus 100 units/mL subcutaneous solution  -- 10 unit(s) subcutaneous once a day (at bedtime)  -- Indication: For Diabetes    lovastatin 40 mg oral tablet  -- 1 tab(s) by mouth once a day  -- Indication: For HLD    hydroCHLOROthiazide 25 mg oral tablet  -- 1 tab(s) by mouth once a day  -- Indication: For HTN (hypertension)    Betoptic S 0.25% ophthalmic suspension  -- 1 drop(s) to each affected eye 2 times a day  -- Indication: For Glaucoma    omeprazole 20 mg oral delayed release tablet  -- 1 tab(s) by mouth once a day  -- Indication: For GERD    Cipro 500 mg oral tablet  -- 1 tab(s) by mouth every 12 hours   -- Avoid prolonged or excessive exposure to direct and/or artificial sunlight while taking this medication.  Check with your doctor before becoming pregnant.  Do not take dairy products, antacids, or iron preparations within one hour of this medication.  Finish all this medication unless otherwise directed by prescriber.  Medication should be taken with plenty of water.    -- Indication: For Pyelonephritis

## 2018-02-27 NOTE — PROGRESS NOTE ADULT - PROBLEM SELECTOR PROBLEM 1
Klebsiella sepsis
Pyelonephritis
Pyelonephritis
Klebsiella sepsis

## 2018-02-27 NOTE — DISCHARGE NOTE ADULT - PLAN OF CARE
Continued treatment and resolution of symptoms. You were diagnosed with a kidney infection with bacteria in your blood and were treated with IV antibiotics with good results. Your signs of infection improved and repeat cultures of your blood showed no signs of the bacteria. You are being discharged on a 12 day course of Ciprofloxacin for continued treatment of this infection. Please take this medication as prescribed and to completion. Follow up with Dr. Diaz within 2 weeks of discharge from the hospital to ensure resolution of symptoms and for continued care. You have a diagnosis of high blood pressure. While you were hospitalized some of your blood pressure medications were held because of your severe infection. As this resolved we restarted most but not all of your home medications. Please resume your home medications at home and follow up with your primary care doctor for continued care. You have a diagnosis of glaucoma. Please continue to take your medications as prescribed and follow up with your doctor for continued care. You have a diagnosis of diabetes. Please continue to take your medications as prescribed and follow up with your doctor for continued care.

## 2018-02-27 NOTE — PHYSICAL THERAPY INITIAL EVALUATION ADULT - GAIT DEVIATIONS NOTED, PT EVAL
increased time in double stance/decreased bri/decreased step length/decreased weight-shifting ability

## 2018-02-27 NOTE — DISCHARGE NOTE ADULT - CARE PLAN
Principal Discharge DX:	Pyelonephritis  Goal:	Continued treatment and resolution of symptoms.  Assessment and plan of treatment:	You were diagnosed with a kidney infection with bacteria in your blood and were treated with IV antibiotics with good results. Your signs of infection improved and repeat cultures of your blood showed no signs of the bacteria. You are being discharged on a 12 day course of Ciprofloxacin for continued treatment of this infection. Please take this medication as prescribed and to completion. Follow up with Dr. Diaz within 2 weeks of discharge from the hospital to ensure resolution of symptoms and for continued care.  Secondary Diagnosis:	HTN (hypertension)  Assessment and plan of treatment:	You have a diagnosis of high blood pressure. While you were hospitalized some of your blood pressure medications were held because of your severe infection. As this resolved we restarted most but not all of your home medications. Please resume your home medications at home and follow up with your primary care doctor for continued care.  Secondary Diagnosis:	Glaucoma  Assessment and plan of treatment:	You have a diagnosis of glaucoma. Please continue to take your medications as prescribed and follow up with your doctor for continued care.  Secondary Diagnosis:	Diabetes  Assessment and plan of treatment:	You have a diagnosis of diabetes. Please continue to take your medications as prescribed and follow up with your doctor for continued care.

## 2018-02-27 NOTE — PROGRESS NOTE ADULT - SUBJECTIVE AND OBJECTIVE BOX
SUBJECTIVE / INTERVAL HPI: Zosyn de-escalated to Ceftriaxone. Surveillance Cx drawn yesterday. NG@12hr. Febrile/HTN O/N. Tylenol + Lisinopril. Patient seen and examined at bedside.     VITAL SIGNS:  Vital Signs Last 24 Hrs  T(C): 36.7 (27 Feb 2018 06:44), Max: 38.8 (27 Feb 2018 05:14)  T(F): 98.1 (27 Feb 2018 06:44), Max: 101.8 (27 Feb 2018 05:14)  HR: 90 (27 Feb 2018 06:44) (69 - 113)  BP: 155/69 (27 Feb 2018 06:44) (150/73 - 185/76)  BP(mean): --  RR: 20 (27 Feb 2018 06:44) (17 - 20)  SpO2: 98% (27 Feb 2018 06:44) (96% - 100%)    PHYSICAL EXAM:    General: Well developed; well nourished; in no acute distress  HEENT: MMM, conjunctiva and sclera clear  Lungs: clear  Heart: regular  Gastrointestinal: Soft, non-tender non-distended; Normal bowel sounds; No rebound or guarding  Extremities: Normal range of motion, No clubbing, cyanosis or edema  Back: Mild CVAT b/l. Improved per pt from yesterday.  Neurological: Alert and oriented x3  Skin: Warm and dry. No obvious rash      MEDICATIONS:  MEDICATIONS  (STANDING):  cefTRIAXone   IVPB 1 Gram(s) IV Intermittent every 24 hours  dextrose 5%. 1000 milliLiter(s) (50 mL/Hr) IV Continuous <Continuous>  dextrose 50% Injectable 12.5 Gram(s) IV Push once  dextrose 50% Injectable 25 Gram(s) IV Push once  dextrose 50% Injectable 25 Gram(s) IV Push once  insulin glargine Injectable (LANTUS) 10 Unit(s) SubCutaneous at bedtime  insulin lispro (HumaLOG) corrective regimen sliding scale   SubCutaneous Before meals and at bedtime  insulin lispro Injectable (HumaLOG) 5 Unit(s) SubCutaneous three times a day before meals  lisinopril 40 milliGRAM(s) Oral daily    MEDICATIONS  (PRN):  acetaminophen   Tablet 650 milliGRAM(s) Oral every 6 hours PRN For Temp greater than 38 C (100.4 F)  dextrose Gel 1 Dose(s) Oral once PRN Blood Glucose LESS THAN 70 milliGRAM(s)/deciliter  glucagon  Injectable 1 milliGRAM(s) IntraMuscular once PRN Glucose LESS THAN 70 milligrams/deciliter      ALLERGIES:  Allergies    No Known Allergies    Intolerances        LABS:                        9.2    4.9   )-----------( 102      ( 26 Feb 2018 06:42 )             29.0     02-26    139  |  100  |  9   ----------------------------<  106<H>  3.4<L>   |  28  |  0.85    Ca    8.6      26 Feb 2018 06:42  Mg     1.7     02-26          CAPILLARY BLOOD GLUCOSE      POCT Blood Glucose.: 151 mg/dL (26 Feb 2018 23:40)      RADIOLOGY & ADDITIONAL TESTS: Reviewed. SUBJECTIVE / INTERVAL HPI: Zosyn de-escalated to Ceftriaxone. Surveillance Cx drawn yesterday. NG@12hr. Febrile/HTN O/N. Tylenol + Lisinopril. Patient seen and examined at bedside. No complaints this morning. Doing well. Denies back pain, dysuria or urinary frequency. Denies CP/SOB/F/C/N/V/HA. ROS otherwise negative.    VITAL SIGNS:  Vital Signs Last 24 Hrs  T(C): 36.7 (27 Feb 2018 06:44), Max: 38.8 (27 Feb 2018 05:14)  T(F): 98.1 (27 Feb 2018 06:44), Max: 101.8 (27 Feb 2018 05:14)  HR: 90 (27 Feb 2018 06:44) (69 - 113)  BP: 155/69 (27 Feb 2018 06:44) (150/73 - 185/76)  BP(mean): --  RR: 20 (27 Feb 2018 06:44) (17 - 20)  SpO2: 98% (27 Feb 2018 06:44) (96% - 100%)    PHYSICAL EXAM:    General: Well developed; well nourished; in no acute distress  HEENT: MMM, conjunctiva and sclera clear  Lungs: clear  Heart: regular  Gastrointestinal: Soft, non-tender non-distended; Normal bowel sounds; No rebound or guarding  Extremities: Normal range of motion, No clubbing, cyanosis or edema  Back: no CVAT b/l.   Neurological: Alert and oriented x3  Skin: Warm and dry. No obvious rash      MEDICATIONS:  MEDICATIONS  (STANDING):  cefTRIAXone   IVPB 1 Gram(s) IV Intermittent every 24 hours  dextrose 5%. 1000 milliLiter(s) (50 mL/Hr) IV Continuous <Continuous>  dextrose 50% Injectable 12.5 Gram(s) IV Push once  dextrose 50% Injectable 25 Gram(s) IV Push once  dextrose 50% Injectable 25 Gram(s) IV Push once  insulin glargine Injectable (LANTUS) 10 Unit(s) SubCutaneous at bedtime  insulin lispro (HumaLOG) corrective regimen sliding scale   SubCutaneous Before meals and at bedtime  insulin lispro Injectable (HumaLOG) 5 Unit(s) SubCutaneous three times a day before meals  lisinopril 40 milliGRAM(s) Oral daily    MEDICATIONS  (PRN):  acetaminophen   Tablet 650 milliGRAM(s) Oral every 6 hours PRN For Temp greater than 38 C (100.4 F)  dextrose Gel 1 Dose(s) Oral once PRN Blood Glucose LESS THAN 70 milliGRAM(s)/deciliter  glucagon  Injectable 1 milliGRAM(s) IntraMuscular once PRN Glucose LESS THAN 70 milligrams/deciliter      ALLERGIES:  Allergies    No Known Allergies    Intolerances        LABS:                        9.2    4.9   )-----------( 102      ( 26 Feb 2018 06:42 )             29.0     02-26    139  |  100  |  9   ----------------------------<  106<H>  3.4<L>   |  28  |  0.85    Ca    8.6      26 Feb 2018 06:42  Mg     1.7     02-26          CAPILLARY BLOOD GLUCOSE      POCT Blood Glucose.: 151 mg/dL (26 Feb 2018 23:40)      RADIOLOGY & ADDITIONAL TESTS: Reviewed. SUBJECTIVE / INTERVAL HPI: Zosyn de-escalated to Ceftriaxone. Surveillance Cx drawn yesterday. NG@12hr. Febrile/HTN O/N. Tylenol + Lisinopril. Patient seen and examined at bedside. No complaints this morning. Doing well. Denies back pain, dysuria or urinary frequency. Denies CP/SOB/F/C/N/V/HA. ROS otherwise negative. Patient refused AM labs.    VITAL SIGNS:  Vital Signs Last 24 Hrs  T(C): 36.7 (27 Feb 2018 06:44), Max: 38.8 (27 Feb 2018 05:14)  T(F): 98.1 (27 Feb 2018 06:44), Max: 101.8 (27 Feb 2018 05:14)  HR: 90 (27 Feb 2018 06:44) (69 - 113)  BP: 155/69 (27 Feb 2018 06:44) (150/73 - 185/76)  BP(mean): --  RR: 20 (27 Feb 2018 06:44) (17 - 20)  SpO2: 98% (27 Feb 2018 06:44) (96% - 100%)    PHYSICAL EXAM:    General: Well developed; well nourished; in no acute distress  HEENT: MMM, conjunctiva and sclera clear  Lungs: clear  Heart: regular  Gastrointestinal: Soft, non-tender non-distended; Normal bowel sounds; No rebound or guarding  Extremities: Normal range of motion, No clubbing, cyanosis or edema  Back: no CVAT b/l.   Neurological: Alert and oriented x3  Skin: Warm and dry. No obvious rash      MEDICATIONS:  MEDICATIONS  (STANDING):  cefTRIAXone   IVPB 1 Gram(s) IV Intermittent every 24 hours  dextrose 5%. 1000 milliLiter(s) (50 mL/Hr) IV Continuous <Continuous>  dextrose 50% Injectable 12.5 Gram(s) IV Push once  dextrose 50% Injectable 25 Gram(s) IV Push once  dextrose 50% Injectable 25 Gram(s) IV Push once  insulin glargine Injectable (LANTUS) 10 Unit(s) SubCutaneous at bedtime  insulin lispro (HumaLOG) corrective regimen sliding scale   SubCutaneous Before meals and at bedtime  insulin lispro Injectable (HumaLOG) 5 Unit(s) SubCutaneous three times a day before meals  lisinopril 40 milliGRAM(s) Oral daily    MEDICATIONS  (PRN):  acetaminophen   Tablet 650 milliGRAM(s) Oral every 6 hours PRN For Temp greater than 38 C (100.4 F)  dextrose Gel 1 Dose(s) Oral once PRN Blood Glucose LESS THAN 70 milliGRAM(s)/deciliter  glucagon  Injectable 1 milliGRAM(s) IntraMuscular once PRN Glucose LESS THAN 70 milligrams/deciliter      ALLERGIES:  Allergies    No Known Allergies    Intolerances        LABS:                        9.2    4.9   )-----------( 102      ( 26 Feb 2018 06:42 )             29.0     02-26    139  |  100  |  9   ----------------------------<  106<H>  3.4<L>   |  28  |  0.85    Ca    8.6      26 Feb 2018 06:42  Mg     1.7     02-26          CAPILLARY BLOOD GLUCOSE      POCT Blood Glucose.: 151 mg/dL (26 Feb 2018 23:40)      RADIOLOGY & ADDITIONAL TESTS: Reviewed.

## 2018-02-27 NOTE — DISCHARGE NOTE ADULT - PATIENT PORTAL LINK FT
You can access the [facility], offered by [facility], by registering with the following website: [email or web address]

## 2018-02-27 NOTE — DISCHARGE NOTE ADULT - HOSPITAL COURSE
72F w/ DM2 on insulin w/ complication, HTN who presented with 3 days of flank and back pain found to have pyelonephritis w/ klebsiella bacteremia. Treated with IV Zosyn throughout the hospitalization with improvement in her symptoms, downtrending WBC found and downtrending fever curve. Patient continued to improve and is being discharged on a total 14 day course of Cipro from last negative blood culture (12 more days). Patient noted to be hypertensive throughout the hospitalization. Home antihypertensive held in 72F w/ DM2 on insulin w/ complication, HTN who presented with 3 days of flank and back pain found to have pyelonephritis w/ klebsiella bacteremia. Treated with IV Zosyn throughout the hospitalization with improvement in her symptoms, downtrending WBC found and downtrending fever curve. Patient continued to improve and is being discharged on a total 14 day course of Cipro from last negative blood culture (12 more days). Patient noted to be hypertensive throughout the hospitalization. Home antihypertensive held in the setting of severe sepsis. Restarted home lisinopril. Will be restarting all home antihypertensives on discharge. Patient to follow up with Dr. Diaz within 2 weeks of discharge. Patient was medically optimized, stable and ready for discharge. Plan of care and return precautions were discussed with the patient who verbally stated understanding. 72F w/ DM2 on insulin w/ complication, HTN who presented with 3 days of flank and back pain found to have pyelonephritis w/ klebsiella bacteremia. Treated with IV Zosyn throughout the hospitalization with improvement in her symptoms, downtrending WBC found and downtrending fever curve. Patient continued to improve and is being discharged on a total 14 day course of Cipro from last negative blood culture (12 more days). Patient noted to be hypertensive throughout the hospitalization. Home antihypertensive held in the setting of severe sepsis. Restarted home lisinopril. Will be restarting all home antihypertensives on discharge. Patient to follow up with Dr. Diaz within 2 weeks of discharge. Patient was medically optimized, stable and ready for discharge. Plan of care and return precautions were discussed with the patient who verbally stated understanding.     Discharge counseling was provided using the COMPLETES-NOW protocol. Teach back was demonstrated by the patient.

## 2018-02-27 NOTE — PHYSICAL THERAPY INITIAL EVALUATION ADULT - CRITERIA FOR SKILLED THERAPEUTIC INTERVENTIONS
impairments found/therapy frequency/functional limitations in following categories/risk reduction/prevention/rehab potential/anticipated discharge recommendation

## 2018-02-27 NOTE — DISCHARGE NOTE ADULT - CARE PROVIDER_API CALL
Wisam Diaz), Medicine  [address] [facility] [address]  [place] [address]  Phone: [phone]  Fax: [phone]

## 2018-02-28 VITALS
TEMPERATURE: 98 F | SYSTOLIC BLOOD PRESSURE: 148 MMHG | DIASTOLIC BLOOD PRESSURE: 72 MMHG | RESPIRATION RATE: 18 BRPM | HEART RATE: 102 BPM | OXYGEN SATURATION: 97 %

## 2018-02-28 LAB
ANION GAP SERPL CALC-SCNC: 12 MMOL/L — SIGNIFICANT CHANGE UP (ref 5–17)
BUN SERPL-MCNC: 9 MG/DL — SIGNIFICANT CHANGE UP (ref 7–23)
CALCIUM SERPL-MCNC: 8.7 MG/DL — SIGNIFICANT CHANGE UP (ref 8.4–10.5)
CHLORIDE SERPL-SCNC: 96 MMOL/L — SIGNIFICANT CHANGE UP (ref 96–108)
CO2 SERPL-SCNC: 27 MMOL/L — SIGNIFICANT CHANGE UP (ref 22–31)
CREAT SERPL-MCNC: 0.71 MG/DL — SIGNIFICANT CHANGE UP (ref 0.5–1.3)
GLUCOSE BLDC GLUCOMTR-MCNC: 138 MG/DL — HIGH (ref 70–99)
GLUCOSE BLDC GLUCOMTR-MCNC: 159 MG/DL — HIGH (ref 70–99)
GLUCOSE SERPL-MCNC: 163 MG/DL — HIGH (ref 70–99)
HCT VFR BLD CALC: 29 % — LOW (ref 34.5–45)
HGB BLD-MCNC: 9.1 G/DL — LOW (ref 11.5–15.5)
MAGNESIUM SERPL-MCNC: 1.6 MG/DL — SIGNIFICANT CHANGE UP (ref 1.6–2.6)
MCHC RBC-ENTMCNC: 25.3 PG — LOW (ref 27–34)
MCHC RBC-ENTMCNC: 31.4 G/DL — LOW (ref 32–36)
MCV RBC AUTO: 80.6 FL — SIGNIFICANT CHANGE UP (ref 80–100)
PLATELET # BLD AUTO: 149 K/UL — LOW (ref 150–400)
POTASSIUM SERPL-MCNC: 4.1 MMOL/L — SIGNIFICANT CHANGE UP (ref 3.5–5.3)
POTASSIUM SERPL-SCNC: 4.1 MMOL/L — SIGNIFICANT CHANGE UP (ref 3.5–5.3)
RBC # BLD: 3.6 M/UL — LOW (ref 3.8–5.2)
RBC # FLD: 13.9 % — SIGNIFICANT CHANGE UP (ref 10.3–16.9)
SODIUM SERPL-SCNC: 135 MMOL/L — SIGNIFICANT CHANGE UP (ref 135–145)
WBC # BLD: 5 K/UL — SIGNIFICANT CHANGE UP (ref 3.8–10.5)
WBC # FLD AUTO: 5 K/UL — SIGNIFICANT CHANGE UP (ref 3.8–10.5)

## 2018-02-28 PROCEDURE — 84132 ASSAY OF SERUM POTASSIUM: CPT

## 2018-02-28 PROCEDURE — 87581 M.PNEUMON DNA AMP PROBE: CPT

## 2018-02-28 PROCEDURE — 96372 THER/PROPH/DIAG INJ SC/IM: CPT | Mod: XU

## 2018-02-28 PROCEDURE — 84484 ASSAY OF TROPONIN QUANT: CPT

## 2018-02-28 PROCEDURE — 74177 CT ABD & PELVIS W/CONTRAST: CPT

## 2018-02-28 PROCEDURE — 84295 ASSAY OF SERUM SODIUM: CPT

## 2018-02-28 PROCEDURE — 85025 COMPLETE CBC W/AUTO DIFF WBC: CPT

## 2018-02-28 PROCEDURE — 99285 EMERGENCY DEPT VISIT HI MDM: CPT | Mod: 25

## 2018-02-28 PROCEDURE — 81001 URINALYSIS AUTO W/SCOPE: CPT

## 2018-02-28 PROCEDURE — 96374 THER/PROPH/DIAG INJ IV PUSH: CPT | Mod: XU

## 2018-02-28 PROCEDURE — 76770 US EXAM ABDO BACK WALL COMP: CPT

## 2018-02-28 PROCEDURE — 87086 URINE CULTURE/COLONY COUNT: CPT

## 2018-02-28 PROCEDURE — 80053 COMPREHEN METABOLIC PANEL: CPT

## 2018-02-28 PROCEDURE — 84300 ASSAY OF URINE SODIUM: CPT

## 2018-02-28 PROCEDURE — 71045 X-RAY EXAM CHEST 1 VIEW: CPT

## 2018-02-28 PROCEDURE — 82330 ASSAY OF CALCIUM: CPT

## 2018-02-28 PROCEDURE — 80048 BASIC METABOLIC PNL TOTAL CA: CPT

## 2018-02-28 PROCEDURE — 83690 ASSAY OF LIPASE: CPT

## 2018-02-28 PROCEDURE — 84540 ASSAY OF URINE/UREA-N: CPT

## 2018-02-28 PROCEDURE — 87798 DETECT AGENT NOS DNA AMP: CPT

## 2018-02-28 PROCEDURE — 76770 US EXAM ABDO BACK WALL COMP: CPT | Mod: 26

## 2018-02-28 PROCEDURE — 85730 THROMBOPLASTIN TIME PARTIAL: CPT

## 2018-02-28 PROCEDURE — 83605 ASSAY OF LACTIC ACID: CPT

## 2018-02-28 PROCEDURE — 82553 CREATINE MB FRACTION: CPT

## 2018-02-28 PROCEDURE — 83036 HEMOGLOBIN GLYCOSYLATED A1C: CPT

## 2018-02-28 PROCEDURE — 85027 COMPLETE CBC AUTOMATED: CPT

## 2018-02-28 PROCEDURE — 87633 RESP VIRUS 12-25 TARGETS: CPT

## 2018-02-28 PROCEDURE — 82803 BLOOD GASES ANY COMBINATION: CPT

## 2018-02-28 PROCEDURE — 83735 ASSAY OF MAGNESIUM: CPT

## 2018-02-28 PROCEDURE — 83935 ASSAY OF URINE OSMOLALITY: CPT

## 2018-02-28 PROCEDURE — 82962 GLUCOSE BLOOD TEST: CPT

## 2018-02-28 PROCEDURE — 84443 ASSAY THYROID STIM HORMONE: CPT

## 2018-02-28 PROCEDURE — 83930 ASSAY OF BLOOD OSMOLALITY: CPT

## 2018-02-28 PROCEDURE — 85610 PROTHROMBIN TIME: CPT

## 2018-02-28 PROCEDURE — 87486 CHLMYD PNEUM DNA AMP PROBE: CPT

## 2018-02-28 PROCEDURE — 97161 PT EVAL LOW COMPLEX 20 MIN: CPT

## 2018-02-28 PROCEDURE — 87186 SC STD MICRODIL/AGAR DIL: CPT

## 2018-02-28 PROCEDURE — 82550 ASSAY OF CK (CPK): CPT

## 2018-02-28 PROCEDURE — 87150 DNA/RNA AMPLIFIED PROBE: CPT

## 2018-02-28 PROCEDURE — 36415 COLL VENOUS BLD VENIPUNCTURE: CPT

## 2018-02-28 PROCEDURE — 93005 ELECTROCARDIOGRAM TRACING: CPT

## 2018-02-28 PROCEDURE — 87040 BLOOD CULTURE FOR BACTERIA: CPT

## 2018-02-28 RX ORDER — MOXIFLOXACIN HYDROCHLORIDE TABLETS, 400 MG 400 MG/1
1 TABLET, FILM COATED ORAL
Qty: 24 | Refills: 0 | OUTPATIENT
Start: 2018-02-28 | End: 2018-03-11

## 2018-02-28 RX ORDER — INSULIN GLARGINE 100 [IU]/ML
0 INJECTION, SOLUTION SUBCUTANEOUS
Qty: 0 | Refills: 0 | COMMUNITY

## 2018-02-28 RX ORDER — MAGNESIUM SULFATE 500 MG/ML
2 VIAL (ML) INJECTION ONCE
Qty: 0 | Refills: 0 | Status: COMPLETED | OUTPATIENT
Start: 2018-02-28 | End: 2018-02-28

## 2018-02-28 RX ORDER — INSULIN ASPART 100 [IU]/ML
0 INJECTION, SOLUTION SUBCUTANEOUS
Qty: 0 | Refills: 0 | COMMUNITY

## 2018-02-28 RX ORDER — AMLODIPINE BESYLATE 2.5 MG/1
5 TABLET ORAL ONCE
Qty: 0 | Refills: 0 | Status: COMPLETED | OUTPATIENT
Start: 2018-02-28 | End: 2018-02-28

## 2018-02-28 RX ORDER — INSULIN GLARGINE 100 [IU]/ML
10 INJECTION, SOLUTION SUBCUTANEOUS
Qty: 0 | Refills: 0 | COMMUNITY

## 2018-02-28 RX ORDER — INSULIN ASPART 100 [IU]/ML
5 INJECTION, SOLUTION SUBCUTANEOUS
Qty: 0 | Refills: 0 | COMMUNITY

## 2018-02-28 RX ORDER — AMLODIPINE BESYLATE 2.5 MG/1
1 TABLET ORAL
Qty: 0 | Refills: 0 | COMMUNITY

## 2018-02-28 RX ORDER — METFORMIN HYDROCHLORIDE 850 MG/1
1 TABLET ORAL
Qty: 0 | Refills: 0 | COMMUNITY

## 2018-02-28 RX ADMIN — CEFTRIAXONE 100 GRAM(S): 500 INJECTION, POWDER, FOR SOLUTION INTRAMUSCULAR; INTRAVENOUS at 15:56

## 2018-02-28 RX ADMIN — LISINOPRIL 40 MILLIGRAM(S): 2.5 TABLET ORAL at 06:02

## 2018-02-28 RX ADMIN — Medication 5 UNIT(S): at 14:49

## 2018-02-28 RX ADMIN — Medication 1 CAPSULE(S): at 09:31

## 2018-02-28 RX ADMIN — Medication 1 CAPSULE(S): at 09:00

## 2018-02-28 RX ADMIN — AMLODIPINE BESYLATE 5 MILLIGRAM(S): 2.5 TABLET ORAL at 09:02

## 2018-02-28 RX ADMIN — Medication 2: at 09:01

## 2018-02-28 RX ADMIN — Medication 5 UNIT(S): at 09:03

## 2018-02-28 RX ADMIN — AMLODIPINE BESYLATE 5 MILLIGRAM(S): 2.5 TABLET ORAL at 05:59

## 2018-02-28 NOTE — DIETITIAN INITIAL EVALUATION ADULT. - PROBLEM SELECTOR PLAN 3
Pt reports adherence to therapy, currently hyperglycemic, may be 2/2 to infection.  Given 6 units regular insulin in ED.   - C/w home lantus 10 u qhs and novolog 5 u TID premeal  - MISS  - f/u A1C

## 2018-02-28 NOTE — CONSULT NOTE ADULT - ASSESSMENT
72F w/ pyelonephritis and GNR bacteremia responding well to antibiotic therapy.    Problem/Plan - 1:  ·  Problem: Klebsiella sepsis.  Plan: - continue ceftriaxone per ID  - f/u Renal ultrasound.     Problem/Plan - 2:  ·  Problem: Pyelonephritis.  Plan: - f/u Urology for recurrent UTI/Pyelo.  - abx as above.

## 2018-02-28 NOTE — DIETITIAN INITIAL EVALUATION ADULT. - NS AS NUTRI INTERV ED CONTENT
CST CHO diet/CHO counting and healthy wt loss/Purpose of the nutrition education/Nutrition relationship to health/disease

## 2018-02-28 NOTE — DIETITIAN INITIAL EVALUATION ADULT. - OTHER INFO
71 y/o F, presenting with b/l flank pain, nausea, and increased urinary frequency found to have acute L pyelonephritis. Pt is tolerating current diet well, po intake > 75% at meals; denies N/V/D/C, last BM was yesterday; w/ nkfa; pt w/ elevated A1C (2/23) 11.2; pt reports that she does not follow diet restrictions strictly; pt finds it hard to restrict/some of the foods/beverages high in CHO such as regular sodas, bagel, etc. Pt also not accostumes to have 3 Pt educated on CST CHO diet and CHO counting; was very receptive to education, went over on the material with pt to familiarize with the information to refer back when needed. Skin intact w/ no edema. 71 y/o F, presenting with b/l flank pain, nausea, and increased urinary frequency found to have acute L pyelonephritis. Pt is tolerating current diet well, po intake > 75% at meals; denies N/V/D/C, last BM was yesterday; w/ nkfa; pt w/ elevated A1C (2/23) 11.2; pt reports that she does not follow diet restrictions strictly; pt finds it hard to restrict/some of the foods/beverages high in CHO such as regular sodas, bagel, etc. Pt also not accustomed to have regular meals and snacks, which is encouraged. Pt educated on CST CHO diet and CHO counting; was very receptive to education, went over on the material with pt to familiarize with the information to refer back when needed. #, Current wt 127#, pt does not believe it is correct, says that she has been at 150# for the past 8 months, and struggling to loose wt; education pertinent to healthy wt loss provided. Skin intact w/ no edema.

## 2018-02-28 NOTE — DIETITIAN INITIAL EVALUATION ADULT. - ENERGY NEEDS
wt (2/27) 57.7kg- please verify wt, pt states that she is around 150#; IBW utilized for needs d/t suspected error; per current wt pt 106% of IBW; bmi 21.8- needs adjusted per age and current status

## 2018-02-28 NOTE — CONSULT NOTE ADULT - SUBJECTIVE AND OBJECTIVE BOX
Attending:  Dr. Christy Pichardo    HPI:  73 yo F with DMT2 (on insulin) and HTN presents with 24 hours of diffuse abd pain radiating to spine and left flank pain. She has had N/V with fevers, urinary frequency, but no dysuria, no hematuria. No anorexia, no diarrhea, no post-prandial pain. No h/o gallstones. + BM, but less flatus today. Last colonoscopy 2 years ago, was normal reportedly.    Since being in ED and receiving IVF and IV Abx, fevers improving.      PAST MEDICAL & SURGICAL HISTORY:  HTN (hypertension)  Diabetes  History of thyroid surgery      MEDICATIONS  (STANDING):  dextrose 5%. 1000 milliLiter(s) (50 mL/Hr) IV Continuous <Continuous>  dextrose 50% Injectable 12.5 Gram(s) IV Push once  dextrose 50% Injectable 25 Gram(s) IV Push once  dextrose 50% Injectable 25 Gram(s) IV Push once  insulin glargine Injectable (LANTUS) 10 Unit(s) SubCutaneous at bedtime  insulin lispro (HumaLOG) corrective regimen sliding scale   SubCutaneous Before meals and at bedtime  insulin lispro Injectable (HumaLOG) 5 Unit(s) SubCutaneous three times a day before meals  piperacillin/tazobactam IVPB. 4.5 Gram(s) IV Intermittent every 6 hours    MEDICATIONS  (PRN):  acetaminophen   Tablet 650 milliGRAM(s) Oral every 6 hours PRN For Temp greater than 38 C (100.4 F)  dextrose Gel 1 Dose(s) Oral once PRN Blood Glucose LESS THAN 70 milliGRAM(s)/deciliter  glucagon  Injectable 1 milliGRAM(s) IntraMuscular once PRN Glucose LESS THAN 70 milligrams/deciliter      Allergies    No Known Allergies      Vital Signs Last 24 Hrs  T(C): 36.3 (2018 08:51), Max: 39.6 (2018 22:25)  T(F): 97.4 (2018 08:51), Max: 103.2 (2018 22:25)  HR: 80 (2018 08:51) (80 - 117)  BP: 155/68 (2018 08:51) (108/50 - 190/64)  BP(mean): --  RR: 19 (2018 08:51) (16 - 19)  SpO2: 99% (2018 08:51) (95% - 99%)    I&O's Summary      Physical Exam:  General: NAD, resting comfortably  HEENT: NC/AT, EOMI, normal hearing, no oral lesions, no LAD, neck supple  Pulmonary: normal resp effort, CTA-B  Cardiovascular: tachycardic, normal rhythm, no murmurs  Abdominal: soft, TTP RUQ, negative Souza's, minimally tender in other quadrants, hypoactive bowel sounds, no guarding, no rigidity, no rebound tenderness. No organomegaly. No psoas/obturator/rovsings signs.   Back: + left CVA tenderness, none on right  Extremities: WWP, normal strength, no clubbing/cyanosis/edema  Neuro: A/O x 3, CNs II-XII grossly intact, normal sensation, no focal deficits  Pulses: palpable distal pulses      LABS:                        10.1   10.6  )-----------( 93       ( 2018 08:17 )             32.1     02-24    137  |  99  |  22  ----------------------------<  166<H>  3.8   |  25  |  1.00    Ca    8.2<L>      2018 08:17    TPro  7.8  /  Alb  3.5  /  TBili  0.5  /  DBili  x   /  AST  43<H>  /  ALT  26  /  AlkPhos  69  02-23    PT/INR - ( 2018 17:10 )   PT: 15.6 sec;   INR: 1.40          PTT - ( 2018 17:10 )  PTT:28.6 sec  Urinalysis Basic - ( 2018 17:21 )    Color: Yellow / Appearance: Cloudy / S.025 / pH: x  Gluc: x / Ketone: NEGATIVE  / Bili: Negative / Urobili: 0.2 E.U./dL   Blood: x / Protein: 30 mg/dL / Nitrite: NEGATIVE   Leuk Esterase: Moderate / RBC: < 5 /HPF / WBC Many /HPF   Sq Epi: x / Non Sq Epi: 5-10 /HPF / Bacteria: Many /HPF      CAPILLARY BLOOD GLUCOSE      POCT Blood Glucose.: 143 mg/dL (2018 08:47)  POCT Blood Glucose.: 140 mg/dL (2018 03:05)  POCT Blood Glucose.: 292 mg/dL (2018 18:54)  POCT Blood Glucose.: 353 mg/dL (2018 16:51)    LIVER FUNCTIONS - ( 2018 17:10 )  Alb: 3.5 g/dL / Pro: 7.8 g/dL / ALK PHOS: 69 U/L / ALT: 26 U/L / AST: 43 U/L / GGT: x             Cultures:  Culture Results:   Aerobic and Anaerobic Bottle: Culture in progress ( @ 22:38)  Culture Results:   Aerobic and Anaerobic Bottle: Culture in progress ( @ 22:38)  Culture Results:   Specimen appears CONTAMINATED. Lab suggests repeat clean catch specimen. ( @ 18:13)      RADIOLOGY & ADDITIONAL STUDIES:  < from: CT Abdomen and Pelvis w/ IV Cont (18 @ 18:54) >  IMPRESSION:   Findings compatible with acuteleft pyelonephritis. No evidence of a   defined abscess along the folia at this time. Urothelial enhancement of   the proximal left ureter consistent with an associated left ureteritis.   Urinary bladder wall thickening with mild perivesicular fat stranding   suggestive for cystitis. Correlate to urinalysis results.    Mildly dilated appendix measuring up to 8 mm without associated   periappendiceal inflammatory changes. Findings are likely a normal size   appendix for this patient however an early acute appendicitis cannot be   entirely excluded. Further clinical correlation is recommended..    < end of copied text >
HPI:  71 yo F w/ PMH DM2 on insulin, HTN, previous admission in 10/2017 for pyelo p/w 3 days of L flank pain.  Associated symptoms include nausea, intermittent chills, increased urinary frequency and foul smelling urine.  Also also reports diffuse abdominal pain that is dull, achy. Pain started around the same time as her urinary symptoms.  Pt was admitted in October for sepsis 2/2 R sided pyelo and dc'ed on Cipro for 10 days for Klebsiella after a 4 day hospital stay.  ROS negative for HA, changes in vision,   In the ED, VS significant for temp 101.3, , Lactate 2.2.  Labs revealed  Na 131, glc 292, UA pos for moderate LE, many WBCs.  CT a/p w/ IV cont revealed cystitis, acute left pyelonephritis and left ureteritis w/ no evidence of abscess.    CT also revealed a mildly dilated appendix, surgery was consulted and ruled out acute appendicitis. She was given 2 L NS x 1, 1 g ceftriaxone IV x 1 and admitted for sepsis 2/2 acute pyelo. (24 Feb 2018 01:37)      PAST MEDICAL & SURGICAL HISTORY:  HTN (hypertension)  Diabetes  History of thyroid surgery        REVIEW OF SYSTEMS:    General: (+) weakness; (+)fevers, (+)chills  Skin/Breast: no rash  Respiratory and Thorax: no SOB, no cough  Cardiovascular:	No chest pain  Gastrointestinal:	 (+) nausea, no vomiting , diarrhea, (+) Abdominal pain  Genitourinary:	(_) dysuria, urinary frequency, no hematuria  Musculoskeletal:	no weakness, no joint swelling/pain  Neurological:	no focal weakness/numbness  Endocrine: no polyuria, no polydipsia      ANTIBIOTICS:  MEDICATIONS  (STANDING):  dextrose 5%. 1000 milliLiter(s) (50 mL/Hr) IV Continuous <Continuous>  dextrose 50% Injectable 12.5 Gram(s) IV Push once  dextrose 50% Injectable 25 Gram(s) IV Push once  dextrose 50% Injectable 25 Gram(s) IV Push once  insulin glargine Injectable (LANTUS) 10 Unit(s) SubCutaneous at bedtime  insulin lispro (HumaLOG) corrective regimen sliding scale   SubCutaneous Before meals and at bedtime  insulin lispro Injectable (HumaLOG) 5 Unit(s) SubCutaneous three times a day before meals  lisinopril 40 milliGRAM(s) Oral daily  piperacillin/tazobactam IVPB. 4.5 Gram(s) IV Intermittent every 6 hours    MEDICATIONS  (PRN):  acetaminophen   Tablet 650 milliGRAM(s) Oral every 6 hours PRN For Temp greater than 38 C (100.4 F)  dextrose Gel 1 Dose(s) Oral once PRN Blood Glucose LESS THAN 70 milliGRAM(s)/deciliter  glucagon  Injectable 1 milliGRAM(s) IntraMuscular once PRN Glucose LESS THAN 70 milligrams/deciliter      Allergies: No Known Allergies    SOCIAL HISTORY: no ETOH, no smoking    FAMILY HISTORY:  No pertinent family history in first degree relatives      Vital Signs Last 24 Hrs  T(C): 37.4 (25 Feb 2018 15:28), Max: 38.9 (24 Feb 2018 21:46)  T(F): 99.3 (25 Feb 2018 15:28), Max: 102 (24 Feb 2018 21:46)  HR: 96 (25 Feb 2018 15:28) (92 - 102)  BP: 162/81 (25 Feb 2018 15:28) (145/72 - 191/73)  RR: 20 (25 Feb 2018 15:28) (17 - 20)  SpO2: 96% (25 Feb 2018 15:28) (96% - 99%)      PHYSICAL EXAM:  Constitutional: Well-developed, well nourished  Eyes:FANI, EOMI  Ear/Nose/Throat: no oral lesion, no sinus tenderness on percussion	  Neck:no JVD, no lymphadenopathy, supple  Respiratory: CTA ruy  Cardiovascular: S1S2 RRR, no murmurs  Gastrointestinal: soft, (+) BS, no HSM, L flank tenderness  Extremities: no e/e/c  Vascular: DP Pulse: right normal; left normal            LABS:                        10.4   7.1   )-----------( 88       ( 25 Feb 2018 07:50 )             32.1     02-25    138  |  98  |  12  ----------------------------<  109<H>  3.5   |  28  |  0.95    Ca    8.8      25 Feb 2018 07:50  Mg     1.6     02-25    MICROBIOLOGY:  Culture - Blood (02.23.18 @ 22:38)    Gram Stain:   Anaerobic Bottle: Gram Negative Rods  Result called to and read back by_ DEON Vázquez RN  02/24/2018 09:55:51  Aerobic Bottle: Gram Negative Rods  Result called to and read back by_ DEON Vázquez RN  02/24/2018 10:17:23  "Due to technical problems, Proteus sp. will Not be reported as part of  the BCID panel until further notice"  ***Blood Panel PCR results on this specimen are available  approximately 3 hours after the Gram stain result.***  Gram stain, PCR, and/or culture results may not always  correspond due to difference in methodologies.  ************************************************************  This PCR assay was performed using [employer].  The following targets are tested for: Enterococcus,  vancomycin resistant enterococci, Listeria monocytogenes,  coagulase negative staphylococci, S. aureus,  methicillin resistant S. aureus, Streptococcus agalactiae  (Group B), S. pneumoniae, S. pyogenes (Group A),  Acinetobacter baumannii, Enterobacter cloacae, E. coli,  Klebsiella oxytoca, K. pneumoniae, Proteus sp.,  Serratia marcescens, Haemophilus influenzae,  Neisseria meningitidis, Pseudomonas aeruginosa, Candida  albicans, C. glabrata, C krusei, C parapsilosis,  C. tropicalis and the KPC resistance gene.    -  Klebsiella pneumoniae: Detec    Specimen Source: .Blood Blood    Organism: Blood Culture PCR    Culture Results:   Growth in aerobic and anaerobic bottles: Gram Negative Rods  Identification and susceptibility to follow.    Organism Identification: Blood Culture PCR    Method Type: PCR    RADIOLOGY & ADDITIONAL STUDIES:  CT Abdomen and Pelvis w/ IV Cont (02.23.18 @ 18:54) >  IMPRESSION:   Findings compatible with acute left pyelonephritis. No evidence of a   defined abscess along the folia at this time. Urothelial enhancement of   the proximal left ureter consistent with an associated left ureteritis.   Urinary bladder wall thickening with mild perivesicular fat stranding   suggestive for cystitis. Correlate to urinalysis results.    Mildly dilated appendix measuring up to 8 mm without associated   periappendiceal inflammatory changes. Findings are likely a normal size   appendix for this patient however an early acute appendicitis cannot be   entirely excluded. Further clinical correlation is recommended..
Patient is a 72y old  Female who presents with a chief complaint of Abdominal pain (27 Feb 2018 15:09)       HPI:  73 yo F w/ PMH DM2 on insulin, HTN, previous admission in 10/2017 for pyelo p/w 3 days of L flank pain.  Associated symptoms include nausea, intermittent chills, increased urinary frequency and foul smelling urine.  Also also reports diffuse abdominal pain that is dull, achy. Pain started around the same time as her urinary symptoms.  Pt was admitted in October for sepsis 2/2 R sided pyelo and dc'ed on Cipro for 10 days for Klebsiella after a 4 day hospital stay.  ROS negative for HA, changes in vision,   In the ED, VS significant for temp 101.3, , Lactate 2.2.  Labs revealed  Na 131, glc 292, UA pos for moderate LE, many WBCs.  CT a/p w/ IV cont revealed cystitis, acute left pyelonephritis and left ureteritis w/ no evidence of abscess.    CT also revealed a mildly dilated appendix, surgery was consulted and ruled out acute appendicitis. She was given 2 L NS x 1, 1 g ceftriaxone IV x 1 and admitted for sepsis 2/2 acute pyelo. (24 Feb 2018 01:37)      PAST MEDICAL & SURGICAL HISTORY:  HTN (hypertension)  Diabetes  History of thyroid surgery      MEDICATIONS  (STANDING):  amLODIPine   Tablet 5 milliGRAM(s) Oral daily  cefTRIAXone   IVPB 1 Gram(s) IV Intermittent every 24 hours  dextrose 5%. 1000 milliLiter(s) (50 mL/Hr) IV Continuous <Continuous>  dextrose 50% Injectable 12.5 Gram(s) IV Push once  dextrose 50% Injectable 25 Gram(s) IV Push once  dextrose 50% Injectable 25 Gram(s) IV Push once  insulin glargine Injectable (LANTUS) 10 Unit(s) SubCutaneous at bedtime  insulin lispro (HumaLOG) corrective regimen sliding scale   SubCutaneous Before meals and at bedtime  insulin lispro Injectable (HumaLOG) 5 Unit(s) SubCutaneous three times a day before meals  lisinopril 40 milliGRAM(s) Oral daily    MEDICATIONS  (PRN):  acetaminophen   Tablet 650 milliGRAM(s) Oral every 6 hours PRN For Temp greater than 38 C (100.4 F)  acetaminophen 300 mG/butalbital 50 mG/ caffeine 40 mG 1 Capsule(s) Oral every 4 hours PRN headaches  dextrose Gel 1 Dose(s) Oral once PRN Blood Glucose LESS THAN 70 milliGRAM(s)/deciliter  glucagon  Injectable 1 milliGRAM(s) IntraMuscular once PRN Glucose LESS THAN 70 milligrams/deciliter      Social History: [place] in an elevator accessible apartment building family lives nearby, has home attendant Mon/Wed/Friday x 5 hrs (assists with cleaning, food shopping)    Functional Level Prior to Admission: ADL independent, walks with a cane/rolling walker    FAMILY HISTORY:  No pertinent family history in first degree relatives      CBC Full  -  ( 28 Feb 2018 07:36 )  WBC Count : 5.0 K/uL  Hemoglobin : 9.1 g/dL  Hematocrit : 29.0 %  Platelet Count - Automated : 149 K/uL  Mean Cell Volume : 80.6 fL  Mean Cell Hemoglobin : 25.3 pg  Mean Cell Hemoglobin Concentration : 31.4 g/dL  Auto Neutrophil # : x  Auto Lymphocyte # : x  Auto Monocyte # : x  Auto Eosinophil # : x  Auto Basophil # : x  Auto Neutrophil % : x  Auto Lymphocyte % : x  Auto Monocyte % : x  Auto Eosinophil % : x  Auto Basophil % : x      02-28    135  |  96  |  9   ----------------------------<  163<H>  4.1   |  27  |  0.71    Ca    8.7      28 Feb 2018 07:36  Mg     1.6     02-28              Radiology:      < from: CT Abdomen and Pelvis w/ IV Cont (02.23.18 @ 18:54) >  EXAM:  CT ABDOMEN AND PELVIS IC                          PROCEDURE DATE:  02/23/2018    Quantity of Contrast in Vial in ml: 120 Contrast Used: Optiray 350  Quantity of Contrast Wasted in ml: 10           INTERPRETATION:  CT of the ABDOMEN and PELVIS with intravenous contrast   dated 2/23/2018 6:54 PM    INDICATION: Left upper quadrant/lower quadrant pain. Fever.    TECHNIQUE: CT of the abdomen and pelvis with intravenous and oral   contrast. Axial, sagittal, and coronal images were obtained andreviewed.    COMPARISON: CT abdomen pelvis 10/5/2017    FINDINGS:    Lower chest: Grossly clear.    Liver: Smooth in contour. No focal mass. Portal and hepatic veins are   patent.    Biliary system: Gallbladder is normal in size. No calcified gallstones.   No biliary ductal dilatation.    Pancreas: Unremarkable.    Spleen: Unremarkable.    Adrenal glands: Unremarkable.    Kidneys: Left perinephric fluid with haziness of the perinephric fat   bordering the left renal cortex, and associated decreased asymmetric   enhancement of the left kidney. There are wedge shaped areas of low   attenuation extending towards the left renal cortical surface. There are   scattered left renal cysts. No evidence of a defined abscess collection   or lobar nephronia. There is urothelial enhancement involving the   proximal left ureter. These findings are consistent with an acute left   renal infection, specifically a left renal hilar pyelonephritis.   Urothelial enhancement of the proximal left ureter consistent with an   associated left ureteritis. Recommend close continued interval follow-up.     Two cysts are seen within the left kidney measuring up to 2.7 cm.     No hydronephrosis. No renal or ureteral stone.     Unremarkable right kidney.    Urinary Bladder: Thick-walled urinary bladder with mild perivesicular fat   stranding. Cannot exclude cystitis.    Reproductive organs: Unremarkable.     Bowel/Peritoneum: Normal caliber without evidence of obstruction. No   appreciable wall thickening. The appendix is fluid-filled and distended   demonstrating circumferential mucosal enhancement measuring up to 8 mm in   diameter. There is no periappendiceal fat stranding or free fluid.   Findings may simply represent a normal size appendix for this patient   however an early acute appendicitis cannot be entirely excluded and   further clinical correlation is recommended. Scratch that    No bowel obstruction. Colonic diverticulosis without evidence of   diverticulitis Lymph nodes: No lymphadenopathy.    Aorta/IVC: Normal caliber. Moderate aortoiliac atherosclerosis.    Abdominal wall: No hernia.    Bones/Soft tissues: No acute abnormality. Heterotopic ossification   bilateral pelvis/gluteal region.      IMPRESSION:   Findings compatible with acuteleft pyelonephritis. No evidence of a   defined abscess along the folia at this time. Urothelial enhancement of   the proximal left ureter consistent with an associated left ureteritis.   Urinary bladder wall thickening with mild perivesicular fat stranding   suggestive for cystitis. Correlate to urinalysis results.    Mildly dilated appendix measuring up to 8 mm without associated   periappendiceal inflammatory changes. Findings are likely a normal size   appendix for this patient however an early acute appendicitis cannot be   entirely excluded. Further clinical correlation is recommended..              Vital Signs Last 24 Hrs  T(C): 36.4 (28 Feb 2018 10:41), Max: 37.8 (27 Feb 2018 20:45)  T(F): 97.6 (28 Feb 2018 10:41), Max: 100.1 (27 Feb 2018 20:45)  HR: 79 (28 Feb 2018 10:41) (79 - 94)  BP: 151/73 (28 Feb 2018 10:41) (147/78 - 173/76)  BP(mean): --  RR: 18 (28 Feb 2018 10:41) (17 - 18)  SpO2: 97% (28 Feb 2018 10:41) (96% - 98%)    REVIEW OF SYSTEMS:    CONSTITUTIONAL: fatigue  EYES: No eye pain, visual disturbances, or discharge  ENMT:  No difficulty hearing, tinnitus, vertigo; No sinus or throat pain  NECK: No pain or stiffness  BREASTS: No pain, masses, or nipple discharge  RESPIRATORY: No cough, wheezing, chills or hemoptysis; No shortness of breath  CARDIOVASCULAR: No chest pain, palpitations, dizziness, or leg swelling  GASTROINTESTINAL: abdominal pain, improved since admission.  GENITOURINARY: No dysuria, frequency, hematuria, or incontinence  NEUROLOGICAL: No headaches, memory loss, loss of strength, numbness, or tremors  SKIN: No itching, burning, rashes, or lesions   LYMPH NODES: No enlarged glands  ENDOCRINE: No heat or cold intolerance; No hair loss  MUSCULOSKELETAL: No joint pain or swelling; No muscle, back, or extremity pain  PSYCHIATRIC: No depression, anxiety, mood swings, or difficulty sleeping  HEME/LYMPH: No easy bruising, or bleeding gums  ALLERGY AND IMMUNOLOGIC: No hives or eczema      Physical Exam: AA woman lying in semi Fowlers position, c/o abdominal pain but better    HEENT: normocephalic,  anicteric,  atraumatic    Neck: supple, negative JVD, negative carotid bruits,    Chest: CTA bilaterally, neg wheeze, rhonchi, rales, crackles, egophany    Cardiovascular: regular rate and rhythm, neg murmurs/rubs/gallops    Abdomen: soft, non distended, mild suprapubic tenderness, negative rebound/guarding, normal bowel sounds, neg hepatosplenomegaly    Extremities: WWP, neg cyanosis/clubbing/edema, negative calf tenderness to palpation, negative Robert's sign    :     Neurologic Exam:    Alert and oriented to person, place, date/year, speech fluent w/o dysarthria, repetition intact, comprehension intact,     Cranial Nerves:     II:                       pupils equal, round and reactive to light, visual fields intact   III/ IV/VI:             extraocular movements intact, neg nystagmus, ptosis  V:                      facial sensation intact, V1-3 normal  VII:                     face symmetric, no droop, normal eye closure and smile  VIII:                    hearing intact to finger rub bilaterally  IX/ X:                 soft palate rise symmetrical  XI:                      head turning, shoulder shrug normal  XII:                     tongue midline    Motor Exam:    Bilateral UE:         5/5 /intrinsics                            5/5 biceps/triceps/wrist extensors-flexors/deltoid                            negative pronator drift      Bilateral LE:         4+/5 hip flexors/adductors/abductors                            5/5 quadriceps/hamstrings                            5/5 dorsiflexors/plantar flexors/invertors-evertors    Sensory:              intact to LT/PP in all UE/LE dermatomes    DTR:                   = biceps/     triceps/     brachioradialis                            = patella/   medial hamstring/    ankle                            neg clonus                            neg Babinski                            neg Hoffmans    Finger to Nose:  wnl    Heel to Shin:       wnl    Rapid Alternating movements:   wnl    Joint Position Sense:  intact    Romberg:  not tested    Tandem Walking:  not tested    Gait:  not tested        PM&R Impression:    1) deconditioned  2) no focal weakness      Recommendations:    1) Physical therapy focusing on therapeutic exercises, bed mobility/transfer out of bed evaluation, progressive ambulation with assistive devices.    2) Disposition Plan: d/c home with home physical therapy, resume home care services

## 2018-02-28 NOTE — PROGRESS NOTE ADULT - SUBJECTIVE AND OBJECTIVE BOX
Pt seen and examined  No complaints feels much better  Temps down as well    REVIEW OF SYSTEMS:  Constitutional: No fever, weight loss or fatigue  Cardiovascular: No chest pain, palpitations, dizziness or leg swelling  Gastrointestinal: No abdominal or epigastric pain. No nausea, vomiting or hematemesis; No diarrhea or constipation. No melena or hematochezia.  Skin: No itching, burning, rashes or lesions       MEDICATIONS:  MEDICATIONS  (STANDING):  amLODIPine   Tablet 5 milliGRAM(s) Oral daily  cefTRIAXone   IVPB 1 Gram(s) IV Intermittent every 24 hours  dextrose 5%. 1000 milliLiter(s) (50 mL/Hr) IV Continuous <Continuous>  dextrose 50% Injectable 12.5 Gram(s) IV Push once  dextrose 50% Injectable 25 Gram(s) IV Push once  dextrose 50% Injectable 25 Gram(s) IV Push once  insulin glargine Injectable (LANTUS) 10 Unit(s) SubCutaneous at bedtime  insulin lispro (HumaLOG) corrective regimen sliding scale   SubCutaneous Before meals and at bedtime  insulin lispro Injectable (HumaLOG) 5 Unit(s) SubCutaneous three times a day before meals  lisinopril 40 milliGRAM(s) Oral daily  magnesium sulfate  IVPB 2 Gram(s) IV Intermittent once    MEDICATIONS  (PRN):  acetaminophen   Tablet 650 milliGRAM(s) Oral every 6 hours PRN For Temp greater than 38 C (100.4 F)  acetaminophen 300 mG/butalbital 50 mG/ caffeine 40 mG 1 Capsule(s) Oral every 4 hours PRN headaches  dextrose Gel 1 Dose(s) Oral once PRN Blood Glucose LESS THAN 70 milliGRAM(s)/deciliter  glucagon  Injectable 1 milliGRAM(s) IntraMuscular once PRN Glucose LESS THAN 70 milligrams/deciliter      Allergies    No Known Allergies    Intolerances        Vital Signs Last 24 Hrs  T(C): 37.7 (28 Feb 2018 05:08), Max: 37.8 (27 Feb 2018 20:45)  T(F): 99.8 (28 Feb 2018 05:08), Max: 100.1 (27 Feb 2018 20:45)  HR: 84 (28 Feb 2018 05:08) (70 - 94)  BP: 173/76 (28 Feb 2018 05:08) (147/78 - 185/76)  BP(mean): --  RR: 17 (28 Feb 2018 05:08) (17 - 18)  SpO2: 96% (28 Feb 2018 05:08) (96% - 98%)    02-27 @ 07:01  -  02-28 @ 07:00  --------------------------------------------------------  IN: 0 mL / OUT: 200 mL / NET: -200 mL        PHYSICAL EXAM:    General: Well developed; well nourished; in no acute distress  HEENT: MMM, conjunctiva and sclera clear  Lungs: clear  Heart: regular  Gastrointestinal: Soft non-tender non-distended; Normal bowel sounds; No hepatosplenomegaly  Skin: Warm and dry. No obvious rash  Ext: no phlebitis    LABS:      CBC Full  -  ( 28 Feb 2018 07:36 )  WBC Count : 5.0 K/uL  Hemoglobin : 9.1 g/dL  Hematocrit : 29.0 %  Platelet Count - Automated : 149 K/uL  Mean Cell Volume : 80.6 fL  Mean Cell Hemoglobin : 25.3 pg  Mean Cell Hemoglobin Concentration : 31.4 g/dL  Auto Neutrophil # : x  Auto Lymphocyte # : x  Auto Monocyte # : x  Auto Eosinophil # : x  Auto Basophil # : x  Auto Neutrophil % : x  Auto Lymphocyte % : x  Auto Monocyte % : x  Auto Eosinophil % : x  Auto Basophil % : x    02-28    135  |  96  |  9   ----------------------------<  163<H>  4.1   |  27  |  0.71    Ca    8.7      28 Feb 2018 07:36  Mg     1.6     02-28                        RADIOLOGY & ADDITIONAL STUDIES (The following images were personally reviewed):

## 2018-02-28 NOTE — PROGRESS NOTE ADULT - PROVIDER SPECIALTY LIST ADULT
Infectious Disease
Infectious Disease
Internal Medicine

## 2018-03-02 DIAGNOSIS — N10 ACUTE PYELONEPHRITIS: ICD-10-CM

## 2018-03-02 DIAGNOSIS — Z98.890 OTHER SPECIFIED POSTPROCEDURAL STATES: ICD-10-CM

## 2018-03-02 DIAGNOSIS — I10 ESSENTIAL (PRIMARY) HYPERTENSION: ICD-10-CM

## 2018-03-02 DIAGNOSIS — A41.59 OTHER GRAM-NEGATIVE SEPSIS: ICD-10-CM

## 2018-03-02 DIAGNOSIS — R65.20 SEVERE SEPSIS WITHOUT SEPTIC SHOCK: ICD-10-CM

## 2018-03-02 DIAGNOSIS — H40.9 UNSPECIFIED GLAUCOMA: ICD-10-CM

## 2018-03-02 DIAGNOSIS — E11.65 TYPE 2 DIABETES MELLITUS WITH HYPERGLYCEMIA: ICD-10-CM

## 2018-03-02 DIAGNOSIS — E87.1 HYPO-OSMOLALITY AND HYPONATREMIA: ICD-10-CM

## 2018-03-03 LAB
CULTURE RESULTS: SIGNIFICANT CHANGE UP
SPECIMEN SOURCE: SIGNIFICANT CHANGE UP

## 2018-09-09 ENCOUNTER — EMERGENCY (EMERGENCY)
Facility: HOSPITAL | Age: 73
LOS: 1 days | Discharge: HOME | End: 2018-09-09
Attending: EMERGENCY MEDICINE | Admitting: EMERGENCY MEDICINE
Payer: MEDICARE

## 2018-09-09 VITALS
OXYGEN SATURATION: 98 % | RESPIRATION RATE: 18 BRPM | HEART RATE: 75 BPM | DIASTOLIC BLOOD PRESSURE: 71 MMHG | SYSTOLIC BLOOD PRESSURE: 137 MMHG | TEMPERATURE: 98 F

## 2018-09-09 VITALS
SYSTOLIC BLOOD PRESSURE: 174 MMHG | HEART RATE: 98 BPM | DIASTOLIC BLOOD PRESSURE: 78 MMHG | OXYGEN SATURATION: 94 % | TEMPERATURE: 98 F | RESPIRATION RATE: 18 BRPM

## 2018-09-09 DIAGNOSIS — Z98.890 OTHER SPECIFIED POSTPROCEDURAL STATES: Chronic | ICD-10-CM

## 2018-09-09 LAB
ALBUMIN SERPL ELPH-MCNC: 4 G/DL — SIGNIFICANT CHANGE UP (ref 3.3–5)
ALP SERPL-CCNC: 87 U/L — SIGNIFICANT CHANGE UP (ref 40–120)
ALT FLD-CCNC: 13 U/L — SIGNIFICANT CHANGE UP (ref 10–45)
ANION GAP SERPL CALC-SCNC: 16 MMOL/L — SIGNIFICANT CHANGE UP (ref 5–17)
AST SERPL-CCNC: 16 U/L — SIGNIFICANT CHANGE UP (ref 10–40)
BASOPHILS NFR BLD AUTO: 0.2 % — SIGNIFICANT CHANGE UP (ref 0–2)
BILIRUB SERPL-MCNC: 0.4 MG/DL — SIGNIFICANT CHANGE UP (ref 0.2–1.2)
BUN SERPL-MCNC: 14 MG/DL — SIGNIFICANT CHANGE UP (ref 7–23)
CALCIUM SERPL-MCNC: 10.1 MG/DL — SIGNIFICANT CHANGE UP (ref 8.4–10.5)
CHLORIDE SERPL-SCNC: 89 MMOL/L — LOW (ref 96–108)
CO2 SERPL-SCNC: 27 MMOL/L — SIGNIFICANT CHANGE UP (ref 22–31)
CREAT SERPL-MCNC: 0.74 MG/DL — SIGNIFICANT CHANGE UP (ref 0.5–1.3)
EOSINOPHIL NFR BLD AUTO: 1.4 % — SIGNIFICANT CHANGE UP (ref 0–6)
GLUCOSE SERPL-MCNC: 368 MG/DL — HIGH (ref 70–99)
HCT VFR BLD CALC: 35.5 % — SIGNIFICANT CHANGE UP (ref 34.5–45)
HGB BLD-MCNC: 11.7 G/DL — SIGNIFICANT CHANGE UP (ref 11.5–15.5)
LYMPHOCYTES # BLD AUTO: 35.5 % — SIGNIFICANT CHANGE UP (ref 13–44)
MAGNESIUM SERPL-MCNC: 1.8 MG/DL — SIGNIFICANT CHANGE UP (ref 1.6–2.6)
MCHC RBC-ENTMCNC: 26.9 PG — LOW (ref 27–34)
MCHC RBC-ENTMCNC: 33 G/DL — SIGNIFICANT CHANGE UP (ref 32–36)
MCV RBC AUTO: 81.6 FL — SIGNIFICANT CHANGE UP (ref 80–100)
MONOCYTES NFR BLD AUTO: 6.3 % — SIGNIFICANT CHANGE UP (ref 2–14)
NEUTROPHILS NFR BLD AUTO: 56.6 % — SIGNIFICANT CHANGE UP (ref 43–77)
PLATELET # BLD AUTO: 207 K/UL — SIGNIFICANT CHANGE UP (ref 150–400)
POTASSIUM SERPL-MCNC: 4.2 MMOL/L — SIGNIFICANT CHANGE UP (ref 3.5–5.3)
POTASSIUM SERPL-SCNC: 4.2 MMOL/L — SIGNIFICANT CHANGE UP (ref 3.5–5.3)
PROT SERPL-MCNC: 8.1 G/DL — SIGNIFICANT CHANGE UP (ref 6–8.3)
RBC # BLD: 4.35 M/UL — SIGNIFICANT CHANGE UP (ref 3.8–5.2)
RBC # FLD: 12.7 % — SIGNIFICANT CHANGE UP (ref 10.3–16.9)
SODIUM SERPL-SCNC: 132 MMOL/L — LOW (ref 135–145)
WBC # BLD: 4.3 K/UL — SIGNIFICANT CHANGE UP (ref 3.8–10.5)
WBC # FLD AUTO: 4.3 K/UL — SIGNIFICANT CHANGE UP (ref 3.8–10.5)

## 2018-09-09 PROCEDURE — 85025 COMPLETE CBC W/AUTO DIFF WBC: CPT

## 2018-09-09 PROCEDURE — 83735 ASSAY OF MAGNESIUM: CPT

## 2018-09-09 PROCEDURE — 80053 COMPREHEN METABOLIC PANEL: CPT

## 2018-09-09 PROCEDURE — 93970 EXTREMITY STUDY: CPT | Mod: 26

## 2018-09-09 PROCEDURE — 93970 EXTREMITY STUDY: CPT

## 2018-09-09 PROCEDURE — 36415 COLL VENOUS BLD VENIPUNCTURE: CPT

## 2018-09-09 PROCEDURE — 96374 THER/PROPH/DIAG INJ IV PUSH: CPT

## 2018-09-09 PROCEDURE — 82550 ASSAY OF CK (CPK): CPT

## 2018-09-09 PROCEDURE — 99284 EMERGENCY DEPT VISIT MOD MDM: CPT | Mod: 25

## 2018-09-09 RX ORDER — KETOROLAC TROMETHAMINE 30 MG/ML
15 SYRINGE (ML) INJECTION ONCE
Qty: 0 | Refills: 0 | Status: DISCONTINUED | OUTPATIENT
Start: 2018-09-09 | End: 2018-09-09

## 2018-09-09 RX ORDER — DIAZEPAM 5 MG
2 TABLET ORAL ONCE
Qty: 0 | Refills: 0 | Status: DISCONTINUED | OUTPATIENT
Start: 2018-09-09 | End: 2018-09-09

## 2018-09-09 RX ORDER — SODIUM CHLORIDE 9 MG/ML
1000 INJECTION INTRAMUSCULAR; INTRAVENOUS; SUBCUTANEOUS ONCE
Qty: 0 | Refills: 0 | Status: COMPLETED | OUTPATIENT
Start: 2018-09-09 | End: 2018-09-09

## 2018-09-09 RX ORDER — CYCLOBENZAPRINE HYDROCHLORIDE 10 MG/1
1 TABLET, FILM COATED ORAL
Qty: 15 | Refills: 0 | OUTPATIENT
Start: 2018-09-09 | End: 2018-09-13

## 2018-09-09 RX ORDER — ACETAMINOPHEN 500 MG
2 TABLET ORAL
Qty: 60 | Refills: 0 | OUTPATIENT
Start: 2018-09-09 | End: 2018-09-13

## 2018-09-09 RX ADMIN — SODIUM CHLORIDE 2000 MILLILITER(S): 9 INJECTION INTRAMUSCULAR; INTRAVENOUS; SUBCUTANEOUS at 08:23

## 2018-09-09 RX ADMIN — Medication 2 MILLIGRAM(S): at 09:56

## 2018-09-09 RX ADMIN — Medication 15 MILLIGRAM(S): at 09:56

## 2018-09-09 NOTE — ED PROVIDER NOTE - PHYSICAL EXAMINATION
VITAL SIGNS: I have reviewed nursing notes and confirm.  CONSTITUTIONAL: Well-developed; well-nourished; in no acute distress.  SKIN: Agree with RN documentation regarding decubitus evaluation. Remainder of skin exam is warm and dry, no acute rash.  HEAD: Normocephalic; atraumatic.  EYES: PERRL, EOM intact; conjunctiva and sclera clear.  ENT: No nasal discharge; airway clear.  NECK: Supple; non tender.  CARD: S1, S2 normal; no murmurs, gallops, or rubs. RRR  RESP: No wheezes, rales or rhonchi. CTA w/good excursion  ABD: Normal bowel sounds; soft; non-distended; non-tender  EXT: Normal ROM. Non-ttp, no peripheral swelling or erythema/skin breakdown. Symmetric appearance LE's w/intact distal pulses  LYMPH: No acute cervical adenopathy.  NEURO: Alert, oriented. Grossly unremarkable.  PSYCH: Cooperative, appropriate.

## 2018-09-09 NOTE — ED PROVIDER NOTE - OBJECTIVE STATEMENT
74 y/o F w/hx IDDM, HTN, HLD (? on statin), p/w b/l severe cramping calf pain that has come and gone for weeks but is more severe over the past 12hrs since returning on a flight from [place] yesterday. States the cramping comes and goes, at times related to exertion but often occurring at rest while in bed. No fever/chills. Denies rash/redness to LE's. No appreciable swelling. No cramping in UE's. No trauma. Notes no recent medication changes. + nausea at times, no vomiting. No abd pain. Normal stooling. No urinary sx. No CP/SOB/palpitations. 74 y/o F w/hx IDDM, HTN, HLD (on statin), p/w b/l severe cramping calf pain that has come and gone for weeks but is more severe over the past 12hrs since returning on a flight from [place] yesterday. States the cramping comes and goes, at times related to exertion but often occurring at rest while in bed. No fever/chills. Denies rash/redness to LE's. No appreciable swelling. No cramping in UE's. No trauma. Notes no recent medication changes. + nausea at times, no vomiting. No abd pain. Normal stooling. No urinary sx. No CP/SOB/palpitations.

## 2018-09-09 NOTE — ED ADULT NURSE NOTE - OBJECTIVE STATEMENT
Patient presents to the ED with atraumatic bilateral lower leg pain x last night.  states this has happened in the past and resolved with IV fluids.  denies any recent travel, leg swelling.

## 2018-09-09 NOTE — ED ADULT NURSE NOTE - NSIMPLEMENTINTERV_GEN_ALL_ED
Implemented All Universal Safety Interventions:  [place] to call system. Call bell, personal items and telephone within reach. Instruct patient to call for assistance. Room bathroom lighting operational. Non-slip footwear when patient is off stretcher. Physically safe environment: no spills, clutter or unnecessary equipment. Stretcher in lowest position, wheels locked, appropriate side rails in place.

## 2018-09-09 NOTE — ED PROVIDER NOTE - MEDICAL DECISION MAKING DETAILS
No evidence of infectious process, myositis, trauma, electrolyte imbalance or DVT. Sx well controlled in ED, possible effect of statin, will hold and f/u with PMD this week, PRN motrin/tylenol/flexaril for spasm. Given return precautions.

## 2018-09-13 DIAGNOSIS — E11.9 TYPE 2 DIABETES MELLITUS WITHOUT COMPLICATIONS: ICD-10-CM

## 2018-09-13 DIAGNOSIS — I10 ESSENTIAL (PRIMARY) HYPERTENSION: ICD-10-CM

## 2018-09-13 DIAGNOSIS — M79.661 PAIN IN RIGHT LOWER LEG: ICD-10-CM

## 2018-09-13 DIAGNOSIS — M79.662 PAIN IN LEFT LOWER LEG: ICD-10-CM

## 2018-09-13 DIAGNOSIS — Z79.899 OTHER LONG TERM (CURRENT) DRUG THERAPY: ICD-10-CM

## 2018-09-13 DIAGNOSIS — Z79.84 LONG TERM (CURRENT) USE OF ORAL HYPOGLYCEMIC DRUGS: ICD-10-CM

## 2018-09-13 DIAGNOSIS — R25.2 CRAMP AND SPASM: ICD-10-CM

## 2018-09-13 DIAGNOSIS — R11.0 NAUSEA: ICD-10-CM

## 2019-02-15 NOTE — H&P ADULT - PROBLEM SELECTOR PLAN 7
Patient expressed no known problems or needs normocytic anemia.  no active bleeding.  unknown baseline.  dose have CKD but at stage 2.   -will check fe panel, b12, folate, retic percent

## 2019-07-31 NOTE — ED ADULT NURSE NOTE - CCCP TRG CHIEF CMPLNT
Patient Education     Middle Ear Infection (Adult)  You have an infection of the middle ear, the space behind the eardrum. This is also called acute otitis media (AOM). Sometimes it is caused by the common cold. This is because congestion can block the internal passage (eustachian tube) that drains fluid from the middle ear. When the middle ear fills with fluid, bacteria can grow there and cause an infection. Oral antibiotics are used to treat this illness, not ear drops. Symptoms usually start to improve within 1 to 2 days of treatment.    Home care  The following are general care guidelines:    Finish all of the antibiotic medicine given, even though you may feel better after the first few days.    You may use over-the-counter medicine, such as acetaminophen or ibuprofen, to control pain and fever, unless something else was prescribed. If you have chronic liver or kidney disease or have ever had a stomach ulcer or gastrointestinal bleeding, talk with your healthcare provider before using these medicines. Do not give aspirin to anyone under 18 years of age who has a fever. It may cause severe illness or death.  Follow-up care  Follow up with your healthcare provider, or as advised, in 2 weeks if all symptoms have not gotten better, or if hearing doesn't go back to normal within 1 month.  When to seek medical advice  Call your healthcare provider right away if any of these occur:    Ear pain gets worse or does not improve after 3 days of treatment    Unusual drowsiness or confusion    Neck pain, stiff neck, or headache    Fluid or blood draining from the ear canal    Fever of 100.4 F (38 C) or as advised     Seizure  Date Last Reviewed: 6/1/2016 2000-2018 [employer]. [address] [place] [address], [place] [address]. All rights reserved. This information is not intended as a substitute for professional medical care. Always follow your healthcare professional's instructions.         1.  Plenty of fluids,  lower leg pain/injury rest, warm compresses on face  2.  Mucinex twice daily for at least 4 days  3.  Rajani Pot 1x in the morning 1x at night (SALINE MIST SPRAY IS AN ACCEPTABLE, THOUGH NOT AS EFFECTIVE REPLACEMENT)  4.  Benadryl (diphenhydramine) at bedtime   5.  Either Claritin (Loratadine), Allegra (Fexofenadine), or Zyrtec (Cetirizine) in the day  6.  Flonase (Fluticasone) 2x each nostril twice a day for two weeks, then once each nostril once a day

## 2019-08-24 ENCOUNTER — EMERGENCY (EMERGENCY)
Facility: HOSPITAL | Age: 74
LOS: 1 days | Discharge: HOME | End: 2019-08-24
Attending: EMERGENCY MEDICINE | Admitting: EMERGENCY MEDICINE
Payer: MEDICARE

## 2019-08-24 VITALS
WEIGHT: 149.91 LBS | HEART RATE: 75 BPM | DIASTOLIC BLOOD PRESSURE: 82 MMHG | TEMPERATURE: 99 F | RESPIRATION RATE: 18 BRPM | HEIGHT: 64 IN | OXYGEN SATURATION: 100 % | SYSTOLIC BLOOD PRESSURE: 187 MMHG

## 2019-08-24 DIAGNOSIS — Z98.890 OTHER SPECIFIED POSTPROCEDURAL STATES: Chronic | ICD-10-CM

## 2019-08-24 LAB
ALBUMIN SERPL ELPH-MCNC: 4.2 G/DL — SIGNIFICANT CHANGE UP (ref 3.3–5)
ALP SERPL-CCNC: 73 U/L — SIGNIFICANT CHANGE UP (ref 40–120)
ALT FLD-CCNC: 14 U/L — SIGNIFICANT CHANGE UP (ref 10–45)
ANION GAP SERPL CALC-SCNC: 12 MMOL/L — SIGNIFICANT CHANGE UP (ref 5–17)
ANION GAP SERPL CALC-SCNC: 16 MMOL/L — SIGNIFICANT CHANGE UP (ref 5–17)
APPEARANCE UR: CLEAR — SIGNIFICANT CHANGE UP
APTT BLD: 27.7 SEC — SIGNIFICANT CHANGE UP (ref 27.5–36.3)
AST SERPL-CCNC: 16 U/L — SIGNIFICANT CHANGE UP (ref 10–40)
BASOPHILS # BLD AUTO: 0.01 K/UL — SIGNIFICANT CHANGE UP (ref 0–0.2)
BASOPHILS NFR BLD AUTO: 0.2 % — SIGNIFICANT CHANGE UP (ref 0–2)
BILIRUB SERPL-MCNC: 0.4 MG/DL — SIGNIFICANT CHANGE UP (ref 0.2–1.2)
BILIRUB UR-MCNC: NEGATIVE — SIGNIFICANT CHANGE UP
BUN SERPL-MCNC: 30 MG/DL — HIGH (ref 7–23)
BUN SERPL-MCNC: 35 MG/DL — HIGH (ref 7–23)
CALCIUM SERPL-MCNC: 8.3 MG/DL — LOW (ref 8.4–10.5)
CALCIUM SERPL-MCNC: 9.2 MG/DL — SIGNIFICANT CHANGE UP (ref 8.4–10.5)
CHLORIDE SERPL-SCNC: 85 MMOL/L — LOW (ref 96–108)
CHLORIDE SERPL-SCNC: 95 MMOL/L — LOW (ref 96–108)
CO2 SERPL-SCNC: 26 MMOL/L — SIGNIFICANT CHANGE UP (ref 22–31)
CO2 SERPL-SCNC: 27 MMOL/L — SIGNIFICANT CHANGE UP (ref 22–31)
COLOR SPEC: YELLOW — SIGNIFICANT CHANGE UP
CREAT SERPL-MCNC: 0.91 MG/DL — SIGNIFICANT CHANGE UP (ref 0.5–1.3)
CREAT SERPL-MCNC: 1.16 MG/DL — SIGNIFICANT CHANGE UP (ref 0.5–1.3)
DIFF PNL FLD: ABNORMAL
EOSINOPHIL # BLD AUTO: 0.01 K/UL — SIGNIFICANT CHANGE UP (ref 0–0.5)
EOSINOPHIL NFR BLD AUTO: 0.2 % — SIGNIFICANT CHANGE UP (ref 0–6)
GLUCOSE SERPL-MCNC: 348 MG/DL — HIGH (ref 70–99)
GLUCOSE SERPL-MCNC: 457 MG/DL — CRITICAL HIGH (ref 70–99)
GLUCOSE UR QL: 250
HCT VFR BLD CALC: 38.1 % — SIGNIFICANT CHANGE UP (ref 34.5–45)
HGB BLD-MCNC: 12.3 G/DL — SIGNIFICANT CHANGE UP (ref 11.5–15.5)
IMM GRANULOCYTES NFR BLD AUTO: 0.4 % — SIGNIFICANT CHANGE UP (ref 0–1.5)
INR BLD: 0.88 — SIGNIFICANT CHANGE UP (ref 0.88–1.16)
KETONES UR-MCNC: NEGATIVE — SIGNIFICANT CHANGE UP
LEUKOCYTE ESTERASE UR-ACNC: NEGATIVE — SIGNIFICANT CHANGE UP
LYMPHOCYTES # BLD AUTO: 1.71 K/UL — SIGNIFICANT CHANGE UP (ref 1–3.3)
LYMPHOCYTES # BLD AUTO: 33.9 % — SIGNIFICANT CHANGE UP (ref 13–44)
MAGNESIUM SERPL-MCNC: 1.8 MG/DL — SIGNIFICANT CHANGE UP (ref 1.6–2.6)
MCHC RBC-ENTMCNC: 26.4 PG — LOW (ref 27–34)
MCHC RBC-ENTMCNC: 32.3 GM/DL — SIGNIFICANT CHANGE UP (ref 32–36)
MCV RBC AUTO: 81.8 FL — SIGNIFICANT CHANGE UP (ref 80–100)
MONOCYTES # BLD AUTO: 0.5 K/UL — SIGNIFICANT CHANGE UP (ref 0–0.9)
MONOCYTES NFR BLD AUTO: 9.9 % — SIGNIFICANT CHANGE UP (ref 2–14)
NEUTROPHILS # BLD AUTO: 2.8 K/UL — SIGNIFICANT CHANGE UP (ref 1.8–7.4)
NEUTROPHILS NFR BLD AUTO: 55.4 % — SIGNIFICANT CHANGE UP (ref 43–77)
NITRITE UR-MCNC: NEGATIVE — SIGNIFICANT CHANGE UP
NRBC # BLD: 0 /100 WBCS — SIGNIFICANT CHANGE UP (ref 0–0)
PH UR: 7 — SIGNIFICANT CHANGE UP (ref 5–8)
PLATELET # BLD AUTO: 183 K/UL — SIGNIFICANT CHANGE UP (ref 150–400)
POTASSIUM SERPL-MCNC: 4 MMOL/L — SIGNIFICANT CHANGE UP (ref 3.5–5.3)
POTASSIUM SERPL-MCNC: 4.7 MMOL/L — SIGNIFICANT CHANGE UP (ref 3.5–5.3)
POTASSIUM SERPL-SCNC: 4 MMOL/L — SIGNIFICANT CHANGE UP (ref 3.5–5.3)
POTASSIUM SERPL-SCNC: 4.7 MMOL/L — SIGNIFICANT CHANGE UP (ref 3.5–5.3)
PROT SERPL-MCNC: 8.3 G/DL — SIGNIFICANT CHANGE UP (ref 6–8.3)
PROT UR-MCNC: NEGATIVE MG/DL — SIGNIFICANT CHANGE UP
PROTHROM AB SERPL-ACNC: 9.9 SEC — LOW (ref 10–12.9)
RBC # BLD: 4.66 M/UL — SIGNIFICANT CHANGE UP (ref 3.8–5.2)
RBC # FLD: 12.7 % — SIGNIFICANT CHANGE UP (ref 10.3–14.5)
SODIUM SERPL-SCNC: 128 MMOL/L — LOW (ref 135–145)
SODIUM SERPL-SCNC: 133 MMOL/L — LOW (ref 135–145)
SP GR SPEC: <=1.005 — SIGNIFICANT CHANGE UP (ref 1–1.03)
UROBILINOGEN FLD QL: 0.2 E.U./DL — SIGNIFICANT CHANGE UP
WBC # BLD: 5.05 K/UL — SIGNIFICANT CHANGE UP (ref 3.8–10.5)
WBC # FLD AUTO: 5.05 K/UL — SIGNIFICANT CHANGE UP (ref 3.8–10.5)

## 2019-08-24 PROCEDURE — 70450 CT HEAD/BRAIN W/O DYE: CPT | Mod: 26

## 2019-08-24 PROCEDURE — 93010 ELECTROCARDIOGRAM REPORT: CPT

## 2019-08-24 PROCEDURE — 99284 EMERGENCY DEPT VISIT MOD MDM: CPT

## 2019-08-24 RX ORDER — DIAZEPAM 5 MG
5 TABLET ORAL ONCE
Refills: 0 | Status: DISCONTINUED | OUTPATIENT
Start: 2019-08-24 | End: 2019-08-24

## 2019-08-24 RX ORDER — ACETAMINOPHEN 500 MG
1000 TABLET ORAL ONCE
Refills: 0 | Status: COMPLETED | OUTPATIENT
Start: 2019-08-24 | End: 2019-08-24

## 2019-08-24 RX ORDER — SODIUM CHLORIDE 9 MG/ML
1000 INJECTION INTRAMUSCULAR; INTRAVENOUS; SUBCUTANEOUS ONCE
Refills: 0 | Status: COMPLETED | OUTPATIENT
Start: 2019-08-24 | End: 2019-08-24

## 2019-08-24 RX ORDER — METOCLOPRAMIDE HCL 10 MG
10 TABLET ORAL ONCE
Refills: 0 | Status: COMPLETED | OUTPATIENT
Start: 2019-08-24 | End: 2019-08-24

## 2019-08-24 RX ADMIN — Medication 400 MILLIGRAM(S): at 21:20

## 2019-08-24 RX ADMIN — SODIUM CHLORIDE 1000 MILLILITER(S): 9 INJECTION INTRAMUSCULAR; INTRAVENOUS; SUBCUTANEOUS at 20:08

## 2019-08-24 RX ADMIN — Medication 10 MILLIGRAM(S): at 20:08

## 2019-08-24 RX ADMIN — SODIUM CHLORIDE 1000 MILLILITER(S): 9 INJECTION INTRAMUSCULAR; INTRAVENOUS; SUBCUTANEOUS at 21:20

## 2019-08-24 RX ADMIN — Medication 1000 MILLIGRAM(S): at 23:41

## 2019-08-24 RX ADMIN — Medication 5 MILLIGRAM(S): at 23:44

## 2019-08-24 RX ADMIN — SODIUM CHLORIDE 1000 MILLILITER(S): 9 INJECTION INTRAMUSCULAR; INTRAVENOUS; SUBCUTANEOUS at 22:30

## 2019-08-24 RX ADMIN — SODIUM CHLORIDE 1000 MILLILITER(S): 9 INJECTION INTRAMUSCULAR; INTRAVENOUS; SUBCUTANEOUS at 21:00

## 2019-08-24 NOTE — ED PROVIDER NOTE - CARE PROVIDER_API CALL
Bre Carballo)  Neurology; Vascular Neurology  [address], [address] [place] [address]  Phone: [phone]  Fax: [phone]  Follow Up Time:

## 2019-08-24 NOTE — ED PROVIDER NOTE - OBJECTIVE STATEMENT
75 y/o female with a PMHx of HTN and DM2 is present in the ER c/o headaches x3 days. She describes a gradual onset of her headaches located on the right side of her head without radiation. She describes a constant discomfort that does not alleviate or aggravate with positional changes. Her associating symptoms include nausea and vomiting. She has been taking tylenol for the pain without improvement. She denies the following: LOC, fever, chills, recent injury/fall, rash, recent illness, cough/sneeze, cervical manipulation/recent strenuous activities, confusion, slurred speech, difficulty ambulating, urinary incontinence, numbness/tingling of extremities, blurred vision, double vision, rhinorrhea, hx of CA, recent spinal injection, recent surgery, ear pain, facial pressure/congestion, hx of seizure/tbi, photophobia.

## 2019-08-24 NOTE — ED PROVIDER NOTE - NEUROLOGICAL, MLM
Alert and oriented, no focal deficits, no motor or sensory deficits. NO NYSTAGMUS, CEREBELLAR FUNCTION INTACT

## 2019-08-24 NOTE — ED PROVIDER NOTE - ATTENDING CONTRIBUTION TO CARE
74F htn, iddm, c/o 3d recurrent unilateral ha. no systemic sx. avss. nontoxic. no acute focal neuro deficits. no e/o infection. no anemia or electrolyte abnl. ekg w/o acute abnl. trop neg. 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 stroke consulted. no emergent intervention/evaluation at this time. sx resolved s/p reglan/benadryl/valium/tylenol/ivf. no indication for emergent mri at this time. will dc home w/ outpatient pcp fu, strict return precautions. pt/family agrees w/ plan. questions answered.     I saw and discussed the care of the pt directly with the ACP while the pt was in the ED. i have reviewed the ACP note and agree w/ the history, exam and plan of care other than as noted above.

## 2019-08-24 NOTE — ED PROVIDER NOTE - CHPI ED SYMPTOMS NEG
no loss of consciousness/no confusion/no change in level of consciousness/no weakness/no blurred vision

## 2019-08-24 NOTE — ED PROVIDER NOTE - ENMT, MLM
Airway patent, Nasal mucosa clear. Mouth with normal mucosa. Throat has no vesicles, no oropharyngeal exudates and uvula is midline. TM B/L NORMAL

## 2019-08-24 NOTE — ED PROVIDER NOTE - CLINICAL SUMMARY MEDICAL DECISION MAKING FREE TEXT BOX
75 y/o female with HA x3 days. Gradual onset not associated with any precipitating event as per hx from pt and daughter who is at bedside. 73 y/o female with HA x3 days. Gradual onset not associated with any precipitating event as per hx from pt and daughter who is at bedside. + N/V. CT head neg. CTA head/neck with mod/sev stenosis of the v1/v2. Discussed results with Dr. Carballo. Pt overall well-appearing with improved HA after treatment requesting for dc. Shared decision making. Will dc home with daughter at bedside with outpatient follow-up with Dr. Carballo regarding findings of CTA head/neck.

## 2019-08-24 NOTE — ED PROVIDER NOTE - NSFOLLOWUPINSTRUCTIONS_ED_ALL_ED_FT
Please follow up with Dr. Carballo regarding your CT results. Return to the Emergency Department if you have any new or worsening symptoms, or if you have any concerns.    Migraine Headache    WHAT YOU NEED TO KNOW:    A migraine is a severe headache. The pain can be so severe that it interferes with your daily activities. A migraine can last a few hours up to several days. The exact cause of migraines is not known.     DISCHARGE INSTRUCTIONS:    Return to the emergency department if:     You have a headache that seems different or much worse than your usual migraine headache.      You have a severe headache with a fever or a stiff neck.       You have new problems with speech, vision, balance, or movement.      You feel like you are going to faint, you become confused, or you have a seizure.     Contact your healthcare provider or neurologist if:     Your migraines interfere with your daily activities.       Your medicines or treatments stop working.      You have questions or concerns about your condition or care.    Medicines: You may need any of the following. Take medicine as soon as you feel a migraine begin.    Prescription pain medicine may be given. Do not wait until the pain is severe before you take your medicine.       Migraine medicines are used to help prevent a migraine or stop it once it starts.       Antinausea medicine may be given to calm your stomach and to help prevent vomiting. This medicine can also help relieve pain.      Take your medicine as directed. Contact your healthcare provider if you think your medicine is not helping or if you have side effects. Tell him or her if you are allergic to any medicine. Keep a list of the medicines, vitamins, and herbs you take. Include the amounts, and when and why you take them. Bring the list or the pill bottles to follow-up visits. Carry your medicine list with you in case of an emergency.    Manage your symptoms:     Rest in a dark, quiet room. This will help decrease your pain. Sleep may also help relieve the pain.      Apply ice to decrease pain. Use an ice pack, or put crushed ice in a plastic bag. Cover the ice pack with a towel and place it on your head. Apply ice for 15 to 20 minutes every hour.      Apply heat to decrease pain and muscle spasms. Use a small towel dampened with warm water or a heating pad, or sit in a warm bath. Apply heat on the area for 20 to 30 minutes every 2 hours. You may alternate heat and ice.      Keep a migraine record. Write down when your migraines start and stop. Include your symptoms and what you were doing when a migraine began. Record what you ate or drank for 24 hours before the migraine started. Keep track of what you did to treat your migraine and if it worked. Bring the migraine record with you to visits with your healthcare provider.    Follow up with your healthcare provider or neurologist as directed: Bring your migraine record with you. Write down your questions so you remember to ask them during your visits.    Prevent another migraine:     Do not smoke. Nicotine and other chemicals in cigarettes and cigars can trigger a migraine or make it worse. Ask your healthcare provider for information if you currently smoke and need help to quit. E-cigarettes or smokeless tobacco still contain nicotine. Talk to your healthcare provider before you use these products.       Do not drink alcohol. Alcohol can trigger a migraine. It can also keep medicines used to treat your migraines from working.      Get regular exercise. Exercise may help prevent migraines. Talk to your healthcare provider about the best exercise plan for you. Try to get at least 30 minutes of exercise on most days.      Manage stress. Stress may trigger a migraine. Learn new ways to relax, such as deep breathing.      Create a sleep schedule. Go to bed and get up at the same times each day. Do not watch television before bed.      Eat regular meals. Include healthy foods such as include fruit, vegetables, whole-grain breads, low-fat dairy products, beans, lean meat, and fish. Do not have food or drinks that trigger your migraines.         © Copyright [employer] 2019 All illustrations and images included in CareNotes are the copyrighted property of [employer] or [employer].      back to top                      © Copyright [employer] 2019

## 2019-08-24 NOTE — ED ADULT NURSE NOTE - CHPI ED NUR SYMPTOMS NEG
no numbness/no confusion/no fever/no loss of consciousness/no blurred vision/no change in level of consciousness/no weakness

## 2019-08-25 VITALS
SYSTOLIC BLOOD PRESSURE: 139 MMHG | TEMPERATURE: 97 F | DIASTOLIC BLOOD PRESSURE: 80 MMHG | RESPIRATION RATE: 18 BRPM | HEART RATE: 80 BPM | OXYGEN SATURATION: 98 %

## 2019-08-25 PROCEDURE — 85025 COMPLETE CBC W/AUTO DIFF WBC: CPT

## 2019-08-25 PROCEDURE — 70496 CT ANGIOGRAPHY HEAD: CPT | Mod: 26

## 2019-08-25 PROCEDURE — 82962 GLUCOSE BLOOD TEST: CPT

## 2019-08-25 PROCEDURE — 83735 ASSAY OF MAGNESIUM: CPT

## 2019-08-25 PROCEDURE — 70498 CT ANGIOGRAPHY NECK: CPT

## 2019-08-25 PROCEDURE — 70498 CT ANGIOGRAPHY NECK: CPT | Mod: 26

## 2019-08-25 PROCEDURE — 80048 BASIC METABOLIC PNL TOTAL CA: CPT

## 2019-08-25 PROCEDURE — 96375 TX/PRO/DX INJ NEW DRUG ADDON: CPT | Mod: XU

## 2019-08-25 PROCEDURE — 96374 THER/PROPH/DIAG INJ IV PUSH: CPT | Mod: XU

## 2019-08-25 PROCEDURE — 85610 PROTHROMBIN TIME: CPT

## 2019-08-25 PROCEDURE — 80053 COMPREHEN METABOLIC PANEL: CPT

## 2019-08-25 PROCEDURE — 93005 ELECTROCARDIOGRAM TRACING: CPT

## 2019-08-25 PROCEDURE — 85730 THROMBOPLASTIN TIME PARTIAL: CPT

## 2019-08-25 PROCEDURE — 81001 URINALYSIS AUTO W/SCOPE: CPT

## 2019-08-25 PROCEDURE — 70496 CT ANGIOGRAPHY HEAD: CPT

## 2019-08-25 PROCEDURE — 96361 HYDRATE IV INFUSION ADD-ON: CPT

## 2019-08-25 PROCEDURE — 70450 CT HEAD/BRAIN W/O DYE: CPT

## 2019-08-25 PROCEDURE — 99284 EMERGENCY DEPT VISIT MOD MDM: CPT | Mod: 25

## 2019-08-25 PROCEDURE — 36415 COLL VENOUS BLD VENIPUNCTURE: CPT

## 2019-08-25 RX ORDER — KETOROLAC TROMETHAMINE 30 MG/ML
15 SYRINGE (ML) INJECTION ONCE
Refills: 0 | Status: DISCONTINUED | OUTPATIENT
Start: 2019-08-25 | End: 2019-08-25

## 2019-08-25 RX ORDER — MAGNESIUM SULFATE 500 MG/ML
1 VIAL (ML) INJECTION ONCE
Refills: 0 | Status: DISCONTINUED | OUTPATIENT
Start: 2019-08-25 | End: 2019-08-25

## 2019-08-25 RX ADMIN — Medication 15 MILLIGRAM(S): at 02:24

## 2019-08-26 ENCOUNTER — INBOUND DOCUMENT (OUTPATIENT)
Age: 74
End: 2019-08-26

## 2019-08-30 DIAGNOSIS — R51 HEADACHE: ICD-10-CM

## 2019-08-30 DIAGNOSIS — I10 ESSENTIAL (PRIMARY) HYPERTENSION: ICD-10-CM

## 2019-08-30 DIAGNOSIS — R11.2 NAUSEA WITH VOMITING, UNSPECIFIED: ICD-10-CM

## 2019-08-30 DIAGNOSIS — Z79.899 OTHER LONG TERM (CURRENT) DRUG THERAPY: ICD-10-CM

## 2019-08-30 DIAGNOSIS — E11.9 TYPE 2 DIABETES MELLITUS WITHOUT COMPLICATIONS: ICD-10-CM

## 2021-01-21 NOTE — ED ADULT TRIAGE NOTE - SPO2 (%)
OFFICE VISIT      Patient: Gurjit Garnett   : 1971 MRN: [id number]    SUBJECTIVE:  Chief Complaint   Patient presents with   • Follow-up     no concerns     A 49 year old male is here for an evaluation of multiple complaints.    HISTORY OF PRESENT ILLNESS:  Diabetes mellitus:  He has a history of diabetes mellitus. His recent labs reveal: The HbA1c is elevated at 9.4% from 7.7% prior. States he was non-adherent to lifestyle modifications in the past few months. He is on oral medications for his diabetes. Has insurance concerns. He is taking apple cider vinegar for weight loss.    Hypertension:  He has a history of hypertension and is adherent to medications.    Hyperlipidemia:  He has a history of hyperlipidemia. His last cholesterol was checked in 2020, and it was high. States next month will be a year after he has quit smoking. The weight measured today has increased to 316.5 lb as compared to 311.5 lb in 2020.    Elevated uric acid in blood:  Has a history of gout and states it gets difficult to move around because of gout.    Additional comments:  Lesion over the back:  States he has a mole on his back.    Screening Labs:  His recent PSA level is 3.08 ng/mL. The microalbumin urine ratio is elevated. The blood counts are normal. Liver enzymes are also mildly elevated. States he has been taking Ibuprofen and drinking in the past few months. The kidney function test is fine. The GFR is 84. Sugars are high. Electrolytes are normal. Calcium level is slightly high. The calcium levels were high in labs from 2020 as well.     Medications:  Mentions he is taking zinc and calcium supplements.    Denies any vision changes, headache, chest pain, shortness of breath, swelling in legs and abdomen pain.    Reports h/o cellulitis in his leg.    PAST MEDICAL HISTORY:  Past Medical History:   Diagnosis Date   • DM (diabetes mellitus) (CMS/HCC)    • Gout    • HTN (hypertension)    • Hypertriglyceridemia     • Insomnia    • Obesity    • Obstructive sleep apnea (adult) (pediatric) 9/12/2013   • Reactive airway disease with wheezing 11/7/2013   • Venous stasis dermatitis 7/25/2014     MEDICATIONS:  Current Outpatient Medications   Medication Sig   • metformin (GLUCOPHAGE) 1000 MG tablet Take 1 tablet by mouth 2 times daily.   • losartan (COZAAR) 100 MG tablet Take 1 tablet by mouth daily.   • glipiZIDE (GLUCOTROL) 10 MG tablet Take 1 tablet by mouth 2 times daily (before meals).   • dilTIAZem (CARDIZEM CD) 240 MG 24 hr capsule Take 1 capsule by mouth daily.   • chlorthalidone (THALITONE) 25 MG tablet Take 1 tablet by mouth daily.   • carvedilol (COREG) 25 MG tablet Take 1 tablet by mouth 2 times daily (with meals).   • atorvastatin (LIPITOR) 40 MG tablet Take 1 tablet by mouth daily.   • allopurinol (ZYLOPRIM) 100 MG tablet Take 2 tablets by mouth daily.   • traZODone (DESYREL) 100 MG tablet Take 1 tablet by mouth nightly.   • indomethacin (INDOCIN) 25 MG capsule Take 1 capsule by mouth 2 times daily (with meals).   • empagliflozin (JARDIANCE) 10 MG tablet Take 1 tablet by mouth daily (before breakfast).   • albuterol 108 (90 Base) MCG/ACT inhaler INHALE 2 PUFFS BY MOUTH FOUR TIMES DAILY IN MORNING, NOON, SUPPER AND BEDTIME FOR 1 WEEK, THEN FOUR TIMES DAILY AS NEEDED   • colchicine (MITIGARE) 0.6 MG capsule Take 1 capsule by mouth 3 times daily as needed (gout flare).     No current facility-administered medications for this visit.      ALLERGIES:  ALLERGIES:  No Known Allergies  PAST SURGICAL HISTORY:  Past Surgical History:   Procedure Laterality Date   • Tonsillectomy and adenoidectomy       FAMILY HISTORY:  History reviewed. No pertinent family history.  SOCIAL HISTORY:  Social History     Tobacco Use   Smoking Status Former Smoker   • Packs/day: 0.00   • Types: Cigarettes   Smokeless Tobacco Never Used     Social History     Substance and Sexual Activity   Alcohol Use Yes   • Alcohol/week: 10.0 standard drinks   •  Types: 10 Standard drinks or equivalent per week       Review Of Systems:  HENT: Per HPI.  Respiratory: Per HPI.    Cardiovascular: Per HPI.  Gastrointestinal: Per HPI.  Musculoskeletal: Per HPI.  Skin: Per HPI.    OBJECTIVE:  Vitals:    01/20/21 0704   BP: 132/86   BP Location: LUE - Left upper extremity   Patient Position: Sitting   Cuff Size: Regular   Pulse: 68   Resp: 16   SpO2: 97%   Weight: (!) 143.6 kg (316 lb 9.6 oz)   Height: 5' 7\" (1.702 m)       Body mass index is 49.59 kg/m².    PHYSICAL EXAM:  Constitutional: Alert, in no acute distress and current vital signs reviewed. Morbidly obese.  Head and Face: Atraumatic and normocephalic.   Eyes: No discharge, no eyelid swelling and the sclerae were normal.   ENT: Oropharynx normal. Normal appearing outer ear. Tympanic membranes are bilaterally clear, normal appearing nose and normal lips.   Neck: Normal appearing neck and supple neck.   Pulmonary: Breath sounds clear to auscultation bilaterally, but no respiratory distress and normal respiratory rate and effort.   Cardiovascular: Normal rate, regular rhythm, normal S1, normal S2 and edema was not present in the lower extremities.   Abdomen: Soft and nontender.   Psychiatric: Alert and awake, interactive and mood/affect were appropriate.   Skin, Hair, Nails: Skin tag in the middle of his back. He also has about a dozen small hyperpigmented lesions. There were two lesions that had small changes to the border and deferring hyperpigmentation throughout the lesions.    DIAGNOSTIC STUDIES:  LAB RESULTS:  Lab Services on 01/14/2021   Component Date Value Ref Range Status   • Hemoglobin A1C 01/14/2021 9.4* 4.5 - 5.6 % Final   • Fasting Status 01/14/2021 1  Hours Final   • Sodium 01/14/2021 137  135 - 145 mmol/L Final   • Potassium 01/14/2021 4.1  3.4 - 5.1 mmol/L Final   • Chloride 01/14/2021 103  98 - 107 mmol/L Final   • Carbon Dioxide 01/14/2021 29  21 - 32 mmol/L Final   • Anion Gap 01/14/2021 9* 10 - 20 mmol/L  Final   • Glucose 01/14/2021 168* 65 - 99 mg/dL Final   • BUN 01/14/2021 24* 6 - 20 mg/dL Final   • Creatinine 01/14/2021 1.00  0.67 - 1.17 mg/dL Final   • Glomerular Filtration Rate 01/14/2021 87* >90 mL/min/1.73m2 Final   • BUN/ Creatinine Ratio 01/14/2021 24  7 - 25 Final   • Calcium 01/14/2021 11.2* 8.4 - 10.2 mg/dL Final   • Bilirubin, Total 01/14/2021 0.6  0.2 - 1.0 mg/dL Final   • GOT/AST 01/14/2021 108* <=37 Units/L Final   • GPT/ALT 01/14/2021 142* <64 Units/L Final   • Alkaline Phosphatase 01/14/2021 92  45 - 117 Units/L Final   • Albumin 01/14/2021 4.0  3.6 - 5.1 g/dL Final   • Protein, Total 01/14/2021 7.8  6.4 - 8.2 g/dL Final   • Globulin 01/14/2021 3.8  2.0 - 4.0 g/dL Final   • A/G Ratio 01/14/2021 1.1  1.0 - 2.4 Final   • Microalbumin, Urine 01/14/2021 7.09  mg/dL Final   • Creatinine, Urine 01/14/2021 169.00  mg/dL Final   • Microalbumin/ Creatinine Ratio 01/14/2021 42.0* <=29.0 mg/g Final   • Prostate Specific Antigen 01/14/2021 3.08  <=4.00 ng/mL Final   • WBC 01/14/2021 6.5  4.2 - 11.0 K/mcL Final   • RBC 01/14/2021 5.14  4.50 - 5.90 mil/mcL Final   • HGB 01/14/2021 15.6  13.0 - 17.0 g/dL Final   • HCT 01/14/2021 46.6  39.0 - 51.0 % Final   • MCV 01/14/2021 90.7  78.0 - 100.0 fl Final   • MCH 01/14/2021 30.4  26.0 - 34.0 pg Final   • MCHC 01/14/2021 33.5  32.0 - 36.5 g/dL Final   • RDW-CV 01/14/2021 13.6  11.0 - 15.0 % Final   • RDW-SD 01/14/2021 44.7  39.0 - 50.0 fL Final   • PLT 01/14/2021 198  140 - 450 K/mcL Final   • Neutrophil, Percent 01/14/2021 58  % Final   • Lymphocytes, Percent 01/14/2021 31  % Final   • Absolute Neutrophils 01/14/2021 3.8  1.8 - 7.7 K/mcL Final   • Absolute Lymphocytes 01/14/2021 2.0  1.0 - 4.8 K/mcL Final   • Absolute Mid Cells 01/14/2021 0.7  0.1 - 1.1 K/mcL Final   • Percent Mid Cells 01/14/2021 11  % Final       ASSESSMENT AND PLAN:  This is a 49 year old male who presents with :  1. Type 2 diabetes mellitus without complication, without long-term current use of  insulin (CMS/[facility])    2. Essential hypertension, benign    3. Hyperlipidemia, unspecified hyperlipidemia type    4. Elevated uric acid in blood        Orders Placed This Encounter   • metformin (GLUCOPHAGE) 1000 MG tablet   • losartan (COZAAR) 100 MG tablet   • glipiZIDE (GLUCOTROL) 10 MG tablet   • dilTIAZem (CARDIZEM CD) 240 MG 24 hr capsule   • chlorthalidone (THALITONE) 25 MG tablet   • carvedilol (COREG) 25 MG tablet   • atorvastatin (LIPITOR) 40 MG tablet   • allopurinol (ZYLOPRIM) 100 MG tablet   • DISCONTD: albuterol 108 (90 Base) MCG/ACT inhaler   • empagliflozin (JARDIANCE) 10 MG tablet       Plan:  Type 2 diabetes mellitus without complication, without long-term current use of insulin (CMS/[facility]):  The diabetes has significantly worsened.  Recommended continuing with oral medications, and he will work on his lifestyle.  Discussed according to the guidelines if the HbA1c increases to 10% we need to put him on insulin.  We will see if insurance will cover Jardiance for 90 days due to losing insurance.    Essential hypertension, benign:  Well controlled  Recommended continuing with current medications.    Hyperlipidemia, unspecified hyperlipidemia type:  Reviewed and discussed weight graph.    Elevated uric acid in blood:  Recommended continuing current medications and to work on lifestyle modifications.    Additional plan discussed:  Skin lesion:  Discussed the lesions might be pre cancerous and to monitor if there are any changes in the lesions over sometime.  Recommended biopsy of the lesions, but he prefers that he will monitor and when he gets better insurance he will return for biopsy.    Lab reports:  Discussed the PSA level is less than 4 ng/mL, and the inference of a false positive PSA and a false negative PSA.  Explained it is natural that the microalbumin urine has increased as the diabetes is worsening.  Discussed the elevated level of calcium may be due to one of the blood pressure medicines he is  on or because of the calcium supplements he is taking. We may consider stopping the calcium supplements and monitor the levels.    Refer to orders.  Medical compliance with plan discussed and risks of non-compliance reviewed.  Patient education completed on disease process, etiology & prognosis.  Proper usage and side effects of medications reviewed & discussed.  Patient understands and agrees with the plan.  Return to clinic as clinically indicated as discussed with patient who verbalized understanding of the plan and is in agreement with the plan.    Return for 3mo follow up w/ labs prior.    I,  Dr. Thee Bravo, have created a visit summary document based on the audio recording between Dr. Luis Tam MD and this patient for the physician to review, edit as needed, and authenticate.  Creation Date: 1/21/2021     I have reviewed and edited the visit summary above and attest that it is accurate.       100
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
## 2023-01-20 NOTE — ED ADULT TRIAGE NOTE - STATUS:
NCCU HISTORY & PHYSICAL      Patient: Karla Villegas Date: 2023   : 1948   Attending: Yony Arceo MD   ? ?   Admission date: 2023      Chief Complaint: s/p anterior cervical diskectomy and fusion, s/p posterior decompression and fusion    HPI:   HPI is obtained via chart review; HPI from patient is limited 2/2 ETT. The patient is a 74 year old female with PMH HTN, diastolic heart failure, severe AS s/p TAVR 2021, AF/AFL s/p ablation in  & , sarcoidosis, hypothyroidism and anxiety/depression who presented to [facility] for an elective cervical spine procedure. Pre-operative H&P and imaging were unavailable for review at the time of this HPI. Patient is able to nod/shake her head. She indicates she was experiencing weakness in her BUE (worse on LUE), decreased sensation (worse on LUE) and neck pain with radiculopathy pre-operatively. She underwent anterior cervical diskectomy and fusion C4/C5 and C5/C6 followed by a posterior decompression and uninstrumented fusion C4-C6. She remained intubated as the procedure was prolonged. She was noted to have worsening quadriparesis post-operatively. STAT imaging pending. Patient was seen and examined in the PACU.    Objective:   Vital Last Value 24 Hour Range   Temperature 96.8 °F (36 °C) (23 0014) Temp  Min: 96.8 °F (36 °C)  Max: 98.2 °F (36.8 °C)   Pulse (!) 57 (23) Pulse  Min: 50  Max: 71   Respiratory 13 (23) Resp  Min: 11  Max: 19   Non-Invasive  Blood Pressure (!) 144/91 (23) BP  Min: 114/69  Max: 145/82   Pulse Oximetry 100 % (23) SpO2  Min: 93 %  Max: 100 %   Arterial   Blood Pressure (!) 156/82 (23) Arterial Line BP  Min: 130/70  Max: 156/82     PERTINENT DIAGNOSTICS/PROCEDURES  I/O last 3 completed shifts:  In: 4750 [I.V.:4750]  Out: 620 [Urine:520; Blood:100]  I/O this shift:  In: 300 [I.V.:300]  Out: 200 [Blood:200]    LABS:  Recent Results (from the past 24 hour(s))   TYPE/SCREEN     Collection Time: 01/19/23 10:28 AM   Result Value Ref Range    ABO/RH(D) O Rh Positive     ANTIBODY SCREEN Negative     TYPE AND SCREEN EXPIRATION DATE 01/22/2023 23:59    BLOOD GAS, ARTERIAL SURGICAL -POINT OF CARE    Collection Time: 01/19/23  1:19 PM   Result Value Ref Range    PH, ARTERIAL - POINT OF CARE 7.43 7.35 - 7.45 Units    PCO2, ARTERIAL - POINT OF CARE 36 32 - 45 mm Hg    PO2, ARTERIAL - POINT OF CARE 276 (H) 83 - 108 mm Hg    HCO3, ARTERIAL - POINT OF CARE 24 22 - 28 mmol/L    BASE EXCESS / DEFICIT, ARTERIAL - POINT OF CARE 0 -2 - 3 mmol/L    O2 SATURATION, ARTERIAL - POINT OF CARE 100 (H) 95 - 99 %    SITE - POINT OF CARE SURGY     HEMOGLOBIN - POINT OF CARE 13.3 12.0 - 15.5 g/dL    SODIUM - POINT OF CARE 135 135 - 145 mmol/L    POTASSIUM - POINT OF CARE 3.6 3.4 - 5.1 mmol/L    CALCIUM, IONIZED - POINT OF CARE 1.09 (L) 1.15 - 1.29 mmol/L    LACTIC ACID, ARTERIAL - POINT OF CARE 0.7 <1.6 mmol/L    GLUCOSE - POINT OF CARE 93 70 - 99 mg/dL    OXYHEMOGLOBIN, ARTERIAL - POINT OF CARE 97.5 94.0 - 98.0 %    METHEMOGLOBIN - POINT OF CARE 0.8 <1.6 %    CARBOXYHEMOGLOBIN - POINT OF CARE 1.7 (H) <1.5 %    O2 CONTENT, ARTERIAL - POINT OF CARE 19.0 15.0 - 23.0 %   BLOOD GAS, ARTERIAL SURGICAL -POINT OF CARE    Collection Time: 01/19/23  8:18 PM   Result Value Ref Range    PH, ARTERIAL - POINT OF CARE 7.29 (L) 7.35 - 7.45 Units    PCO2, ARTERIAL - POINT OF CARE 53 (H) 32 - 45 mm Hg    PO2, ARTERIAL - POINT OF CARE 156 (H) 83 - 108 mm Hg    HCO3, ARTERIAL - POINT OF CARE 26 22 - 28 mmol/L    BASE EXCESS / DEFICIT, ARTERIAL - POINT OF CARE -2 -2 - 3 mmol/L    O2 SATURATION, ARTERIAL - POINT OF CARE 99 95 - 99 %    SITE - POINT OF CARE SURGY     HEMOGLOBIN - POINT OF CARE 12.2 12.0 - 15.5 g/dL    SODIUM - POINT OF CARE 135 135 - 145 mmol/L    POTASSIUM - POINT OF CARE 3.2 (L) 3.4 - 5.1 mmol/L    CALCIUM, IONIZED - POINT OF CARE 1.12 (L) 1.15 - 1.29 mmol/L    LACTIC ACID, ARTERIAL - POINT OF CARE 0.5 <1.6 mmol/L     GLUCOSE - POINT OF CARE 136 (H) 70 - 99 mg/dL    OXYHEMOGLOBIN, ARTERIAL - POINT OF CARE 97.1 94.0 - 98.0 %    METHEMOGLOBIN - POINT OF CARE 0.9 <1.6 %    CARBOXYHEMOGLOBIN - POINT OF CARE 1.4 <1.5 %    O2 CONTENT, ARTERIAL - POINT OF CARE 17.0 15.0 - 23.0 %   BLOOD GAS, ARTERIAL SURGICAL -POINT OF CARE    Collection Time: 01/19/23 10:09 PM   Result Value Ref Range    PH, ARTERIAL - POINT OF CARE 7.35 7.35 - 7.45 Units    PCO2, ARTERIAL - POINT OF CARE 41 32 - 45 mm Hg    PO2, ARTERIAL - POINT OF CARE 100 83 - 108 mm Hg    HCO3, ARTERIAL - POINT OF CARE 23 22 - 28 mmol/L    BASE EXCESS / DEFICIT, ARTERIAL - POINT OF CARE -3 (L) -2 - 3 mmol/L    O2 SATURATION, ARTERIAL - POINT OF CARE 99 95 - 99 %    SITE - POINT OF CARE SURGY     HEMOGLOBIN - POINT OF CARE 12.5 12.0 - 15.5 g/dL    SODIUM - POINT OF CARE 133 (L) 135 - 145 mmol/L    POTASSIUM - POINT OF CARE 3.6 3.4 - 5.1 mmol/L    CALCIUM, IONIZED - POINT OF CARE 1.28 1.15 - 1.29 mmol/L    LACTIC ACID, ARTERIAL - POINT OF CARE 0.8 <1.6 mmol/L    GLUCOSE - POINT OF CARE 154 (H) 70 - 99 mg/dL    OXYHEMOGLOBIN, ARTERIAL - POINT OF CARE 96.1 94.0 - 98.0 %    METHEMOGLOBIN - POINT OF CARE 1.1 <1.6 %    CARBOXYHEMOGLOBIN - POINT OF CARE 1.3 <1.5 %    O2 CONTENT, ARTERIAL - POINT OF CARE 17.0 15.0 - 23.0 %   CBC with Automated Differential (performable only)    Collection Time: 01/20/23  2:56 AM   Result Value Ref Range    WBC 11.6 (H) 4.2 - 11.0 K/mcL    RBC 3.54 (L) 4.00 - 5.20 mil/mcL    HGB 11.8 (L) 12.0 - 15.5 g/dL    HCT 33.8 (L) 36.0 - 46.5 %    MCV 95.5 78.0 - 100.0 fl    MCH 33.3 26.0 - 34.0 pg    MCHC 34.9 32.0 - 36.5 g/dL    RDW-CV 12.3 11.0 - 15.0 %    RDW-SD 42.7 39.0 - 50.0 fL     140 - 450 K/mcL    NRBC 0 <=0 /100 WBC    Neutrophil, Percent 91 %    Lymphocytes, Percent 6 %    Mono, Percent 3 %    Eosinophils, Percent 0 %    Basophils, Percent 0 %    Immature Granulocytes 0 %    Absolute Neutrophils 10.5 (H) 1.8 - 7.7 K/mcL    Absolute Lymphocytes 0.7  (L) 1.0 - 4.0 K/mcL    Absolute Monocytes 0.3 0.3 - 0.9 K/mcL    Absolute Eosinophils  0.0 0.0 - 0.5 K/mcL    Absolute Basophils 0.0 0.0 - 0.3 K/mcL    Absolute Immmature Granulocytes 0.0 0.0 - 0.2 K/mcL       IMAGING:  FL GUIDANCE WITHOUT REPORT   Final Result      FL GUIDANCE WITHOUT REPORT   Final Result      MRI BRAIN WO CONTRAST    (Results Pending)   MRI CERVICAL SPINE WO CONTRAST    (Results Pending)   MRI THORACIC SPINE WO CONTRAST    (Results Pending)   CT CERVICAL SPINE WO CONTRAST    (Results Pending)        Physical Exam  Physical Exam  Vitals reviewed.   Constitutional:       General: She is not in acute distress.     Interventions: She is intubated.      Comments: Awakens easily to voice, not on continuous sedation   HENT:      Head: Normocephalic.   Eyes:      Extraocular Movements: Extraocular movements intact.      Conjunctiva/sclera: Conjunctivae normal.      Pupils: Pupils are equal, round, and reactive to light.   Neck:      Comments: Hard collar in place, anterior cervical dressing CDI, surgical drain x 2  Cardiovascular:      Rate and Rhythm: Bradycardia present. Rhythm irregularly irregular.  Extrasystoles are present.     Heart sounds: S1 normal and S2 normal.   Pulmonary:      Effort: No accessory muscle usage or respiratory distress. She is intubated.      Breath sounds: Normal breath sounds and air entry.   Abdominal:      General: There is no distension.      Palpations: Abdomen is soft.      Tenderness: There is no abdominal tenderness.   Musculoskeletal:      Right lower leg: No edema.      Left lower leg: No edema.   Skin:     General: Skin is cool and dry.   Neurological:      GCS: GCS eye subscore is 3. GCS verbal subscore is 1. GCS motor subscore is 6.      Comments: Awakens easily to voice, communicates with nodding/shaking head. PERRL, EOMI, no obvious facial droop with ETT present, + cough.   RUE: Deltoid 3/5, Biceps/triceps 4-/5, hand grasp 3/5  LUE: Deltoid 0/5, Biceps 1/5,  Triceps 0/5, hand grasp 0/5  RLE: HF 4-/5, KE/KF 4+/5, PF/DF 4+/5  LLE: HF 1-2/5, KE/KF 1-2/5, PF/DF 2/5  Unable to reliably determine sensory level. Patient does report being able to feel light touch in all extremities.          ASSESSMENT/PLAN    Neuro:   Cervical spinal stenosis with radiculopathy, s/p anterior cervical diskectomy and fusion C4/C5 and C5/C6 followed by a posterior decompression and uninstrumented fusion C4-C6  -orthopedic surgery following, appreciate recs  -STAT CT C-spine pending  -MRI C/T-spine and brain pending, unable to reach family in order to complete MRI pre-screen  -hard collar on at all times per ortho  -MAP goal above 80 per ortho  -pain control: PRN fentanyl, add PO medications when PO access secured  -neuro checks, notify with changes  -PT/OT when appropriate    Pulmonary:   Postprocedural respiratory failure  -remained intubated post-op given prolonged nature of surgery  -currently on PSV, continue as patient tolerates  -obtain ABG with LA upon arrival to NCCU  -CXR to confirm ETT placement  -titrate FiO2 to maintain SpO2 above 92%    CV:   H/o HTN  Chronic diastolic heart failure  H/o AF/AFL s/p ablation in 2015 and 2017  H/o severe AS s/p TAVR 8/2021  -MAP goal above 80 per ortho as noted above  -hold PTA lisinopril for now given MAP goal  -hold PTA metoprolol succinate for now given bradycardia  -hold PTA Xarelto  -TTE 8/2021 EF 66%, well-seated TAVR valve without AI    Renal:   -no recent BUN/Cr  -obtain BMP and lytes now  -Murray catheter, strict I&Os    GI:   H/o GERD  -resume home PPI and start bowel regimen when PO access secured  -NPO for now  -Zofran PRN    Heme:   -hgb 12.5 intra-op  -obtain CBC now    Endocrine:   H/o hypothyroidism  -resume PTA levothyroxine when PO access is secured  -monitor blood glucose every 6 hours  -LDSSI if required    ID:   -CBC pending  -afebrile  -CTM    FEN:   -IVF  -replete lytes PRN  -NPO    Proph: SCDs, no SQH in the immediate post-op  period-- start when cleared by ortho surgery      BEST PRACTICES:  - VTE prophylaxis: SCDs  - GI prophylaxis: resume home PPI  - Nutrition: NPO  - Therapy: PT/OT when appropriate  - Code status: Full code     - Disposition: NCCU    Critical care time is 45 minutes, excludes teaching, or separately billed procedures, or any concurrent time with other providers and was not duplicative services. I independently assessed and managed the patient's critical illness as documented, performing face-to-face patient care, including rendering the following critical care: Reviewed all the radiological studies available and their interpretation, reviewed  all the available labwork and all the pertinent consultant's notes. Patient is critically ill as documented above and requires high complexity medical decision making and active titration of therapies to preserve life. Dr. Ulrich was available for collaboration during this encounter.     Silke Montalvo, CNP       [employer]

## 2023-10-29 NOTE — PROGRESS NOTE ADULT - SUBJECTIVE AND OBJECTIVE BOX
Patient seen and examined at bedside. Pain and ROM has markedly improved.  over biceps anteriorly. Patchy erythema over arm and face. MRI reviewed. No signs of deep infection on MRI. Unlikely to be cellulitis or acute infection as cause of pain and skin changes. Less likely to be PTS as pain is provoked by motion and palpation but should be considered. More likely to be adhesive capsulitis/calcific tendonitis. Continue to monitor skin reaction and appreciate ID recommendations. Recommend treatment with PO and/or IV steroids as previously indicated.      Will discuss with Dr. Tani Rivas in [place] MD   [facility] Fellow patient known to me for years    73 yo F PMH DM2 on insulin w/ complication, HTN presenting with weakness for over one week.  Prior to her weakness, she has been having dysuria, polyuria, and malodorous urine for 2-3 weeks.  Urine is yellow, more frothy than usual.  No gross hematuria.  Had similar symptoms 4 yrs ago 2/2 UTI but was not hospitalized then.  States she also has been having chills for 2-3 d.  Also reports lower abdominal pain, central, that worsens when lying on her back.  Pain is usually constant, sharp, currently 7/10.  Pain started around the same time as her urinary symptoms.  Denies NVD, fever, cough, CP, palpitations, SOB.  Did not want to seek care initially but due to ongoing weakness, decided to come to the ED.  Has been compliant with her meds but did not take them today.  States she feels very cold. CT - pyelonephritis  I  REVIEW OF SYSTEMS:  Constitutional: No fever, weight loss or fatigue, feels better overnight  ENMT:  No difficulty hearing, tinnitus, vertigo; No sinus or throat pain  Respiratory: No cough, wheezing, chills or hemoptysis  Cardiovascular: No chest pain, palpitations, dizziness or leg swelling  Gastrointestinal: back pain better  Skin: No itching, burning, rashes or lesions   Musculoskeletal: No joint pain or swelling; No muscle, back or extremity pain    PAST MEDICAL & SURGICAL HISTORY:  HTN (hypertension)  Diabetes  History of thyroid surgery      FAMILY HISTORY:      SOCIAL HISTORY:  Smoking Status: [ ] Current, [ ] Former, [ ] Never  Pack Years:    MEDICATIONS:  MEDICATIONS  (STANDING):  atorvastatin 10 milliGRAM(s) Oral at bedtime  dextrose 5%. 1000 milliLiter(s) (50 mL/Hr) IV Continuous <Continuous>  dextrose 50% Injectable 12.5 Gram(s) IV Push once  dextrose 50% Injectable 25 Gram(s) IV Push once  dextrose 50% Injectable 25 Gram(s) IV Push once  insulin glargine Injectable (LANTUS) 10 Unit(s) SubCutaneous every morning  insulin lispro (HumaLOG) corrective regimen sliding scale   SubCutaneous Before meals and at bedtime  pantoprazole    Tablet 40 milliGRAM(s) Oral before breakfast  piperacillin/tazobactam IVPB. 3.375 Gram(s) IV Intermittent every 6 hours    MEDICATIONS  (PRN):  acetaminophen   Tablet. 650 milliGRAM(s) Oral every 6 hours PRN Moderate Pain (4 - 6)  dextrose Gel 1 Dose(s) Oral once PRN Blood Glucose LESS THAN 70 milliGRAM(s)/deciliter  glucagon  Injectable 1 milliGRAM(s) IntraMuscular once PRN Glucose LESS THAN 70 milligrams/deciliter      Allergies    No Known Allergies    Intolerances        Vital Signs Last 24 Hrs  T(C): 36.6 (06 Oct 2017 08:28), Max: 38.9 (05 Oct 2017 19:28)  T(F): 97.9 (06 Oct 2017 08:28), Max: 102.1 (05 Oct 2017 19:28)  HR: 71 (06 Oct 2017 08:28) (71 - 108)  BP: 116/71 (06 Oct 2017 08:28) (116/71 - 198/72)  BP(mean): --  RR: 17 (06 Oct 2017 08:28) (16 - 20)  SpO2: 99% (06 Oct 2017 08:28) (94% - 99%)    10-05 @ 07:01  -  10-06 @ 07:00  --------------------------------------------------------  IN: 1200 mL / OUT: 0 mL / NET: 1200 mL          PHYSICAL EXAM:    General: elderly; well nourished; in no acute distress  HEENT: MMM, conjunctiva and sclera clear  Lungs: clear  Heart: no murmur, regular  Gastrointestinal: Soft, non-tender non-distended; Normal bowel sounds; No rebound or guarding  Extremities: Normal range of motion, No clubbing, cyanosis or edema  Neurological: Alert and oriented x3  Skin: Warm and dry. No obvious rash      LABS:                        9.2    9.2   )-----------( 228      ( 06 Oct 2017 06:14 )             28.5     10-06    134<L>  |  94<L>  |  14  ----------------------------<  290<H>  3.8   |  26  |  0.82    Ca    8.4      06 Oct 2017 06:13  Mg     2.4     10-06    TPro  8.1  /  Alb  3.0<L>  /  TBili  0.3  /  DBili  x   /  AST  22  /  ALT  12  /  AlkPhos  76  10-05      Culture Results:   No growth at 12 hours (10-05 @ 20:52)  Culture Results:   No growth at 12 hours (10-05 @ 20:52)      RADIOLOGY & ADDITIONAL STUDIES:   < from: CT Abdomen and Pelvis w/ IV Cont (10.05.17 @ 22:53) >  *PRELIMINARY REPORT    < end of copied text >  < from: CT Abdomen and Pelvis w/ IV Cont (10.05.17 @ 22:53) >  1. Right pyelonephritis.  2. There is a cluster of small fluid attenuation structures in the   superior pole the left kidney, laterally,  the largest of which measures 9 mm in size, with equivocal associated   local decreased renal  enhancement, potentially representing pyelonephritis with small   subcentimeter abscesses.  Alternatively, this may represent small incidental renal cysts.    < end of copied text >

## 2024-03-20 NOTE — PATIENT PROFILE ADULT. - BLOOD AVOIDANCE/RESTRICTIONS, PROFILE
Doing well overall.  Normal fetal movement.  Had normal growth US with [facility] this AM.  Referral given for weekly BPPs.  Reviewed labor precautions and warning signs.  GBS positive in urine. Will check hgb.  Declines VE today--vertex by [facility] ultrasound.  Brief discussion re: option of IOL at 39 weeks.  Discussed assessment of cervix, risks, benefits, alternatives.  Blake states that she doesn't feel she would want to go past EDC but might wait a bit beyond 39 weeks.   none

## 2024-09-24 NOTE — H&P ADULT - HISTORY OF PRESENT ILLNESS
Letter for not working will need to come from ortho, he has not had labs refilled due to missed appointment. s   73 yo F PMH DM2 on insulin w/ complication, HTN presenting with weakness for over one week.  Prior to her weakness, she has been having dysuria, polyuria, and malodorous urine for 2-3 weeks.  Urine is yellow, more frothy than usual.  No gross hematuria.  Had similar symptoms 4 yrs ago 2/2 UTI but was not hospitalized then.  States she also has been having chills for 2-3 d.  Also reports lower abdominal pain, central, that worsens when lying on her back.  Pain is usually constant, sharp, currently 7/10.  Pain started around the same time as her urinary symptoms.  Denies NVD, fever, cough, CP, palpitations, SOB.  Did not want to seek care initially but due to ongoing weakness, decided to come to the ED.  Has been compliant with her meds but did not take them today.    In the ED, VS significant for temp 102.1, .  Labs revealed  Na 127, K 6.0, glc 472, UA pos for small LE, many WBCs.  CT a/p w/ IV cont revealed R pyelo and L small fluid attenuations, cysts vs. subcentimeter abscesses.  Admitted for sepsis 2/2 acute pyelo. 73 yo F PMH DM2 on insulin w/ complication, HTN presenting with weakness for over one week.  Prior to her weakness, she has been having dysuria, polyuria, and malodorous urine for 2-3 weeks.  Urine is yellow, more frothy than usual.  No gross hematuria.  Had similar symptoms 4 yrs ago 2/2 UTI but was not hospitalized then.  States she also has been having chills for 2-3 d.  Also reports lower abdominal pain, central, that worsens when lying on her back.  Pain is usually constant, sharp, currently 7/10.  Pain started around the same time as her urinary symptoms.  Denies NVD, fever, cough, CP, palpitations, SOB.  Did not want to seek care initially but due to ongoing weakness, decided to come to the ED.  Has been compliant with her meds but did not take them today.  States she feels very cold.  In the ED, VS significant for temp 102.1, .  Labs revealed  Na 127, K 6.0, glc 472, UA pos for small LE, many WBCs.  CT a/p w/ IV cont revealed R pyelo and L small fluid attenuations, cysts vs. subcentimeter abscesses.  Admitted for sepsis 2/2 acute pyelo. 73 yo F PMH DM2 on insulin w/ complication, HTN presenting with weakness for over one week.  Prior to her weakness, she has been having dysuria, polyuria, and malodorous urine for 2-3 weeks.  Urine is yellow, more frothy than usual.  No gross hematuria.  Had similar symptoms 4 yrs ago 2/2 UTI but was not hospitalized then.  States she also has been having chills for 2-3 d.  Also reports lower abdominal pain, central, that worsens when lying on her back.  Pain is usually constant, sharp, currently 7/10.  Pain started around the same time as her urinary symptoms.  Denies NVD, fever, cough, CP, palpitations, SOB.  Did not want to seek care initially but due to ongoing weakness, decided to come to the ED.  Has been compliant with her meds but did not take them today.  States she feels very cold.  In the ED, VS significant for temp 102.1, .  Labs revealed  Na 127, K 6.0, glc 472, UA pos for small LE, many WBCs.  CT a/p w/ IV cont revealed R pyelo and L small fluid attenuations, cysts vs. subcentimeter abscesses.  Given 2 L NS x 1, 1 g ceft IV x 1, tylenol 975 PO x 1, ca gluconate 1 g IV x 1, 10 U regular insulin.  Admitted for sepsis 2/2 acute pyelo.

## 2024-10-10 NOTE — PROGRESS NOTE ADULT - ASSESSMENT
Forms received from: [employer]   Phone number listed: [phone]   Fax listed: [phone]  Date received: 10/9/24  Form description: Order ID [id number]  Once forms are completed, please return to [employer] via fax.  Is patient requesting to be contacted when forms are completed: na  Phone: na  Form placed: Dr. Winters's in box   71 yo F with PMHx of HTN and diabetes on insulin presented due to weakness x 1 week and dysuria, burning upon urination, and malodorous urine x 3 week. CT with IV contrast demonstrated R-sided pyelonephritis and potential L-sided subcentimenter abscess. Blood culture PCR + for bacteremia with Klebsiella.

## 2024-12-23 NOTE — ED PROVIDER NOTE - CROS ED ENDOCRINE ALL NEG
12/23/2024      Clarke Moreira  [address]  [facility] [address]        No notes on file      Sincerely,        Oma Ram, REAGAN CNP    Electronically signed   negative...